# Patient Record
Sex: FEMALE | Race: WHITE | Employment: OTHER | URBAN - METROPOLITAN AREA
[De-identification: names, ages, dates, MRNs, and addresses within clinical notes are randomized per-mention and may not be internally consistent; named-entity substitution may affect disease eponyms.]

---

## 2017-01-03 ENCOUNTER — OFFICE VISIT (OUTPATIENT)
Dept: DERMATOLOGY | Age: 72
End: 2017-01-03

## 2017-01-03 DIAGNOSIS — Z12.83 SCREENING EXAM FOR SKIN CANCER: ICD-10-CM

## 2017-01-03 DIAGNOSIS — L57.0 ACTINIC KERATOSES: Primary | ICD-10-CM

## 2017-01-03 DIAGNOSIS — Z85.828 HISTORY OF NONMELANOMA SKIN CANCER: ICD-10-CM

## 2017-01-03 PROCEDURE — 99213 OFFICE O/P EST LOW 20 MIN: CPT | Performed by: DERMATOLOGY

## 2017-01-03 PROCEDURE — 17003 DESTRUCT PREMALG LES 2-14: CPT | Performed by: DERMATOLOGY

## 2017-01-03 PROCEDURE — 17000 DESTRUCT PREMALG LESION: CPT | Performed by: DERMATOLOGY

## 2017-01-04 ENCOUNTER — TELEPHONE (OUTPATIENT)
Dept: INTERNAL MEDICINE CLINIC | Age: 72
End: 2017-01-04

## 2017-01-16 ENCOUNTER — OFFICE VISIT (OUTPATIENT)
Dept: ORTHOPEDIC SURGERY | Age: 72
End: 2017-01-16

## 2017-01-16 VITALS — HEIGHT: 64 IN | WEIGHT: 164.24 LBS | BODY MASS INDEX: 28.04 KG/M2

## 2017-01-16 DIAGNOSIS — M41.9 SCOLIOSIS, UNSPECIFIED SCOLIOSIS TYPE, UNSPECIFIED SPINAL REGION: ICD-10-CM

## 2017-01-16 DIAGNOSIS — S32.591D: Primary | ICD-10-CM

## 2017-01-16 DIAGNOSIS — S32.10XD CLOSED FRACTURE OF SACRUM WITH ROUTINE HEALING, UNSPECIFIED PORTION OF SACRUM, SUBSEQUENT ENCOUNTER: ICD-10-CM

## 2017-01-16 DIAGNOSIS — M25.551 PAIN OF RIGHT HIP JOINT: ICD-10-CM

## 2017-01-16 PROCEDURE — 99024 POSTOP FOLLOW-UP VISIT: CPT | Performed by: ORTHOPAEDIC SURGERY

## 2017-01-16 PROCEDURE — 72170 X-RAY EXAM OF PELVIS: CPT | Performed by: ORTHOPAEDIC SURGERY

## 2017-01-20 ENCOUNTER — HOSPITAL ENCOUNTER (OUTPATIENT)
Dept: GENERAL RADIOLOGY | Age: 72
Discharge: HOME | End: 2017-01-20
Attending: INTERNAL MEDICINE | Admitting: INTERNAL MEDICINE

## 2017-01-20 DIAGNOSIS — E78.5 HYPERLIPIDEMIA: ICD-10-CM

## 2017-01-20 LAB
A/G RATIO: 1.3 (ref 1.1–2.2)
ALBUMIN SERPL-MCNC: 3.7 G/DL (ref 3.4–5)
ALP BLD-CCNC: 90 U/L (ref 40–129)
ALT SERPL-CCNC: 8 U/L (ref 10–40)
ANION GAP SERPL CALCULATED.3IONS-SCNC: 12 MMOL/L (ref 3–16)
AST SERPL-CCNC: 13 U/L (ref 15–37)
BILIRUB SERPL-MCNC: 0.4 MG/DL (ref 0–1)
BUN BLDV-MCNC: 14 MG/DL (ref 7–20)
CALCIUM SERPL-MCNC: 9.2 MG/DL (ref 8.3–10.6)
CHLORIDE BLD-SCNC: 104 MMOL/L (ref 99–110)
CHOLESTEROL, TOTAL: 162 MG/DL (ref 0–199)
CO2: 27 MMOL/L (ref 21–32)
CREAT SERPL-MCNC: 1.1 MG/DL (ref 0.6–1.2)
GFR AFRICAN AMERICAN: 59
GFR NON-AFRICAN AMERICAN: 49
GLOBULIN: 2.9 G/DL
GLUCOSE BLD-MCNC: 84 MG/DL (ref 70–99)
HDLC SERPL-MCNC: 53 MG/DL (ref 40–60)
LDL CHOLESTEROL CALCULATED: 83 MG/DL
POTASSIUM SERPL-SCNC: 4.3 MMOL/L (ref 3.5–5.1)
SODIUM BLD-SCNC: 143 MMOL/L (ref 136–145)
TOTAL PROTEIN: 6.6 G/DL (ref 6.4–8.2)
TRIGL SERPL-MCNC: 132 MG/DL (ref 0–150)
VLDLC SERPL CALC-MCNC: 26 MG/DL

## 2017-01-23 ENCOUNTER — OFFICE VISIT (OUTPATIENT)
Dept: INTERNAL MEDICINE CLINIC | Age: 72
End: 2017-01-23

## 2017-01-23 VITALS
SYSTOLIC BLOOD PRESSURE: 132 MMHG | BODY MASS INDEX: 25.95 KG/M2 | WEIGHT: 152 LBS | HEIGHT: 64 IN | HEART RATE: 81 BPM | OXYGEN SATURATION: 93 % | DIASTOLIC BLOOD PRESSURE: 78 MMHG

## 2017-01-23 DIAGNOSIS — S32.591A: ICD-10-CM
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
## 2017-01-23 DIAGNOSIS — E78.5 HYPERLIPIDEMIA, UNSPECIFIED HYPERLIPIDEMIA TYPE: ICD-10-CM

## 2017-01-23 DIAGNOSIS — M41.9 SCOLIOSIS, UNSPECIFIED SCOLIOSIS TYPE, UNSPECIFIED SPINAL REGION: ICD-10-CM

## 2017-01-23 DIAGNOSIS — F32.A DEPRESSION, UNSPECIFIED DEPRESSION TYPE: ICD-10-CM

## 2017-01-23 DIAGNOSIS — S32.10XA CLOSED FRACTURE OF SACRUM, UNSPECIFIED PORTION OF SACRUM, INITIAL ENCOUNTER (HCC): ICD-10-CM

## 2017-01-23 DIAGNOSIS — R06.09 DYSPNEA ON EXERTION: Primary | ICD-10-CM

## 2017-01-23 PROCEDURE — 99214 OFFICE O/P EST MOD 30 MIN: CPT | Performed by: INTERNAL MEDICINE

## 2017-01-24 ENCOUNTER — HOSPITAL ENCOUNTER (OUTPATIENT)
Dept: PHYSICAL THERAPY | Age: 72
Discharge: HOME | End: 2017-01-31
Admitting: ORTHOPAEDIC SURGERY

## 2017-02-07 ENCOUNTER — HOSPITAL ENCOUNTER (OUTPATIENT)
Dept: MAMMOGRAPHY | Age: 72
Discharge: HOME | End: 2017-02-07
Attending: INTERNAL MEDICINE | Admitting: INTERNAL MEDICINE

## 2017-02-07 DIAGNOSIS — Z12.31 ENCOUNTER FOR SCREENING MAMMOGRAM FOR MALIGNANT NEOPLASM OF BREAST: ICD-10-CM

## 2017-03-23 ENCOUNTER — HOSPITAL ENCOUNTER (OUTPATIENT)
Dept: NON INVASIVE DIAGNOSTICS | Age: 72
Discharge: HOME | End: 2017-03-23
Attending: INTERNAL MEDICINE | Admitting: INTERNAL MEDICINE

## 2017-03-23 ENCOUNTER — HOSPITAL ENCOUNTER (OUTPATIENT)
Dept: CARDIOLOGY | Facility: CLINIC | Age: 72
Discharge: HOME | End: 2017-03-23
Attending: INTERNAL MEDICINE | Admitting: INTERNAL MEDICINE

## 2017-03-23 DIAGNOSIS — R06.09 OTHER FORMS OF DYSPNEA: ICD-10-CM

## 2017-04-09 ENCOUNTER — TELEPHONE (OUTPATIENT)
Dept: INTERNAL MEDICINE CLINIC | Age: 72
End: 2017-04-09

## 2017-05-09 ENCOUNTER — TELEPHONE (OUTPATIENT)
Dept: INTERNAL MEDICINE CLINIC | Age: 72
End: 2017-05-09

## 2017-05-09 DIAGNOSIS — Z78.0 ASYMPTOMATIC POSTMENOPAUSAL STATUS (AGE-RELATED) (NATURAL): Primary | ICD-10-CM

## 2017-05-09 RX ORDER — MULTIVIT-MIN/IRON/FOLIC ACID/K 18-600-40
2000 CAPSULE ORAL DAILY
COMMUNITY
End: 2017-10-10

## 2017-05-23 ENCOUNTER — HOSPITAL ENCOUNTER (OUTPATIENT)
Dept: MAMMOGRAPHY | Age: 72
Discharge: HOME | End: 2017-05-23
Attending: INTERNAL MEDICINE | Admitting: INTERNAL MEDICINE

## 2017-05-23 DIAGNOSIS — Z78.0 ASYMPTOMATIC POSTMENOPAUSAL STATUS: ICD-10-CM

## 2017-05-23 DIAGNOSIS — Z78.0 ASYMPTOMATIC MENOPAUSAL STATE: ICD-10-CM

## 2017-07-24 ENCOUNTER — HOSPITAL ENCOUNTER (OUTPATIENT)
Dept: GENERAL RADIOLOGY | Age: 72
Discharge: HOME | End: 2017-07-24
Attending: INTERNAL MEDICINE | Admitting: INTERNAL MEDICINE

## 2017-07-24 DIAGNOSIS — E78.5 HYPERLIPIDEMIA, UNSPECIFIED HYPERLIPIDEMIA TYPE: ICD-10-CM

## 2017-07-24 LAB
A/G RATIO: 1.3 (ref 1.1–2.2)
ALBUMIN SERPL-MCNC: 4 G/DL (ref 3.4–5)
ALP BLD-CCNC: 62 U/L (ref 40–129)
ALT SERPL-CCNC: 9 U/L (ref 10–40)
ANION GAP SERPL CALCULATED.3IONS-SCNC: 10 MMOL/L (ref 3–16)
AST SERPL-CCNC: 14 U/L (ref 15–37)
BILIRUB SERPL-MCNC: <0.2 MG/DL (ref 0–1)
BUN BLDV-MCNC: 20 MG/DL (ref 7–20)
CALCIUM SERPL-MCNC: 9.2 MG/DL (ref 8.3–10.6)
CHLORIDE BLD-SCNC: 104 MMOL/L (ref 99–110)
CHOLESTEROL, TOTAL: 182 MG/DL (ref 0–199)
CO2: 28 MMOL/L (ref 21–32)
CREAT SERPL-MCNC: 1.2 MG/DL (ref 0.6–1.2)
GFR AFRICAN AMERICAN: 53
GFR NON-AFRICAN AMERICAN: 44
GLOBULIN: 3 G/DL
GLUCOSE BLD-MCNC: 98 MG/DL (ref 70–99)
HDLC SERPL-MCNC: 55 MG/DL (ref 40–60)
LDL CHOLESTEROL CALCULATED: 104 MG/DL
POTASSIUM SERPL-SCNC: 4.4 MMOL/L (ref 3.5–5.1)
SODIUM BLD-SCNC: 142 MMOL/L (ref 136–145)
TOTAL PROTEIN: 7 G/DL (ref 6.4–8.2)
TRIGL SERPL-MCNC: 114 MG/DL (ref 0–150)
VLDLC SERPL CALC-MCNC: 23 MG/DL

## 2017-07-25 ENCOUNTER — OFFICE VISIT (OUTPATIENT)
Dept: INTERNAL MEDICINE CLINIC | Age: 72
End: 2017-07-25

## 2017-07-25 VITALS
OXYGEN SATURATION: 91 % | SYSTOLIC BLOOD PRESSURE: 116 MMHG | HEIGHT: 64 IN | BODY MASS INDEX: 26.29 KG/M2 | DIASTOLIC BLOOD PRESSURE: 70 MMHG | WEIGHT: 154 LBS | HEART RATE: 82 BPM

## 2017-07-25 DIAGNOSIS — G47.00 INSOMNIA, UNSPECIFIED TYPE: ICD-10-CM

## 2017-07-25 DIAGNOSIS — E78.5 HYPERLIPIDEMIA, UNSPECIFIED HYPERLIPIDEMIA TYPE: Primary | ICD-10-CM

## 2017-07-25 DIAGNOSIS — K21.00 GASTROESOPHAGEAL REFLUX DISEASE WITH ESOPHAGITIS: ICD-10-CM

## 2017-07-25 DIAGNOSIS — F32.A DEPRESSION, UNSPECIFIED DEPRESSION TYPE: ICD-10-CM

## 2017-07-25 PROCEDURE — 99214 OFFICE O/P EST MOD 30 MIN: CPT | Performed by: INTERNAL MEDICINE

## 2017-09-21 ENCOUNTER — TELEPHONE (OUTPATIENT)
Dept: INTERNAL MEDICINE CLINIC | Age: 72
End: 2017-09-21

## 2017-09-21 DIAGNOSIS — M72.0 DUPUYTREN'S CONTRACTURE: Primary | ICD-10-CM

## 2017-10-03 ENCOUNTER — OFFICE VISIT (OUTPATIENT)
Dept: ORTHOPEDIC SURGERY | Age: 72
End: 2017-10-03

## 2017-10-03 VITALS — BODY MASS INDEX: 27.3 KG/M2 | HEIGHT: 63 IN | WEIGHT: 154.1 LBS

## 2017-10-03 DIAGNOSIS — R52 PAIN: Primary | ICD-10-CM

## 2017-10-03 DIAGNOSIS — M72.0 DUPUYTREN'S CONTRACTURE OF BOTH HANDS: ICD-10-CM

## 2017-10-03 PROCEDURE — 99214 OFFICE O/P EST MOD 30 MIN: CPT | Performed by: ORTHOPAEDIC SURGERY

## 2017-10-03 NOTE — MR AVS SNAPSHOT
After Visit Summary             Bhupinder Alejandre   10/3/2017 1:45 PM   Office Visit    Description:  Female : 1945   Provider:  Kevon Chan MD   Department:  [employer]              Your Follow-Up and Future Appointments         Below is a list of your follow-up and future appointments. This may not be a complete list as you may have made appointments directly with providers that we are not aware of or your providers may have made some for you. Please call your providers to confirm appointments. It is important to keep your appointments. Please bring your current insurance card, photo ID, co-pay, and all medication bottles to your appointment. If self-pay, payment is expected at the time of service. Your To-Do List     Future Appointments Provider Department Dept Phone    10/26/2017 10:45 AM Toyin Rowe MD [facility] [phone]    Please arrive 15 minutes prior to appointment, bring photo ID and insurance card. 2018 1:15 PM Mac Cox MD [facility] [phone]    Please arrive 15 minutes prior to appointment, bring photo ID and insurance card. Information from Your Visit        Department     Name Address Phone Fax    [employer] [address] [place], [place] [address]cruz Jacobson [address] [phone]      You Were Seen for:         Comments    Pain   [[id number]]         Vital Signs     Height Weight Body Mass Index Smoking Status          5' 3.39\" (1.61 m) 154 lb 1.6 oz (69.9 kg) 26.97 kg/m2 Never Smoker        Additional Information about your Body Mass Index (BMI)           Your BMI as listed above is considered overweight (25.0-29.9). BMI is an estimate of body fat, calculated from your height and weight.   The higher your BMI, the greater your risk of heart disease, high blood pressure, type 2 diabetes, stroke, gallstones, arthritis, sleep apnea, and certain cancers. BMI is not perfect. It may overestimate body fat in athletes and people who are more muscular. If your body fat is high you can improve your BMI by decreasing your calorie intake and becoming more physically active. Learn more at: [employer]             Medications and Orders      Your Current Medications Are              traZODone (DESYREL) 50 MG tablet TAKE ONE AND ONE-HALF TABLETS BY MOUTH EVERY NIGHT AT BEDTIME    pravastatin (PRAVACHOL) 20 MG tablet TAKE 1 TABLET BY MOUTH EVERY EVENING FOR HIGH CHOLESTEROL    Cholecalciferol (VITAMIN D) 2000 UNITS CAPS capsule Take 2,000 Units by mouth daily    buPROPion (WELLBUTRIN SR) 150 MG extended release tablet TAKE 1 TABLET BY MOUTH EVERY DAY FOR DEPRESSION    FLUoxetine (PROZAC) 20 MG capsule Take 1 capsule by mouth daily Depression medicine. meloxicam (MOBIC) 15 MG tablet TAKE 1 TABLET BY MOUTH DAILY WITH MEALS AS NEEDED FOR KNEE PAIN    omeprazole (PRILOSEC) 20 MG capsule Take 20 mg by mouth daily    Cetirizine HCl (ZYRTEC PO) Take 1 tablet by mouth daily.         Allergies              Amoxicillin Rash    Entex Pse [Pseudoephedrine-guaifenesin]     gittery         Result Summary for XR HAND RIGHT (MIN 3 VIEWS)      Result Information     Date and Time          Resulted: 10/3/2017  1:31 PM                       Additional Information        Basic Information     Date Of Birth Sex Race Ethnicity Preferred Language    1945 Female White Non-/Non  English      Problem List as of 10/3/2017  Date Reviewed: 7/25/2017                Hyperlipidemia    Depression    Insomnia    IBS (irritable bowel syndrome)    Cervical radiculopathy    Shingles    Arthritis    Dupuytren's contracture    Gastroesophageal reflux disease with esophagitis    Scoliosis    Closed nondisplaced fracture of acetabulum ([address]    Fracture of ramus of right pubis ([address]    Closed fracture of sacrum (HCC)    Pelvic fracture ([address] 6. Create a [employer] password. You can change your password at any time. 7. Enter your Password Reset Question and Answer. This can be used at a later time if you forget your password. 8. Enter your e-mail address. You will receive e-mail notification when new information is available in [address] 9. Click Sign Up. You can now view your medical record. Additional Information  If you have questions, please contact the physician practice where you receive care. Remember, [employer] is NOT to be used for urgent needs. For medical emergencies, dial 911. For questions regarding your [employer] account call [phone]. If you have a clinical question, please call your doctor's office.

## 2017-10-03 NOTE — PROGRESS NOTES
deficit. Hand Examination  Inspection:  Right hand reveals thick cording within the palm in line with the middle finger and ring finger. No sign of infection. Contracture noted of the MP joints    Palpation:  Cords are thick consistent with Dupuytren's. No pain with palpation    Range of Motion:  Excellent flexion of the fingers, no clicking. 25-35° flexion contracture middle finger and ring finger MP joint, not passively correctable. Strength:  Intact neurovascular exam    Special Tests:  She fails the tabletop test    Skin: There are no additional worrisome rashes, ulcerations or lesions. Gait: normal    Circulation: well perfused    Additional Comments:     Additional Examinations:  Left Upper Extremity: Examination of the left upper extremity does not show any tenderness, deformity or injury. Range of motion is unremarkable. There is no gross instability. There is cording and nodularity within the left palm also. However she has full flexion and extension of the hand at this time. She passes the tabletop test     Radiology:     None today      Assessment:  Dupuytren's disease, worse on the right hand    Impression:   Encounter Diagnoses   Name Primary?  Pain Yes    Dupuytren's contracture of both hands        Office Procedures:  No orders of the defined types were placed in this encounter. Treatment Plan:  The patient is unhappy with the Dupuytren's contracture at this point. She has tried activity modifications and stretching for more than 10 years. Therefore, we discussed proceeding with Dupuytren's disease excision. We discussed Dupuytren's disease today, its genetic basis, and the fact that there is no known cure at this point. Treatment options were discussed at length (surgical excision, needle aponeurotomy, injection with xiaflex). The patient is unhappy with the Dupuytren's disease within the hand and elects for surgical excision.   Surgical excision is a debulking procedure. There will still be residual disease, secondary to the genetic basis. Recurrence therefore is a known entity. Surgical scars can be sensitive. Contractures can recur postsurgically despite debulking of the Dupuytren's disease. Motion of the hand and fingers is very critical postsurgically to achieve optimal outcomes. Postoperative hand therapy is therefore critical, and was explained today. She would like to proceed. This will be as an outpatient procedure, most likely under general and local.  We will ask for preoperative evaluation and clearance. We appreciate the patient referral.    All questions and concerns were addressed today. Patient is in agreement with the plan.         Roseanne Sanchez MD  Hand & Upper Extremity Surgery  [facility] ([facility])

## 2017-10-04 RX ORDER — FLUOXETINE HYDROCHLORIDE 20 MG/1
CAPSULE ORAL
Qty: 30 CAPSULE | Refills: 0 | Status: SHIPPED | OUTPATIENT
Start: 2017-10-04 | End: 2017-10-04 | Stop reason: SDUPTHER

## 2017-10-04 RX ORDER — FLUOXETINE HYDROCHLORIDE 20 MG/1
CAPSULE ORAL
Qty: 90 CAPSULE | Refills: 0 | Status: SHIPPED | OUTPATIENT
Start: 2017-10-04 | End: 2018-01-03 | Stop reason: SDUPTHER

## 2017-10-04 NOTE — TELEPHONE ENCOUNTER
Refill request for        -        Fluoxetine 20 MG            medication.      Name of [facility]   # [id number]    Last visit - 07/25/17     Pending visit - 10/26/17    Last refill -10/27/17

## 2017-10-05 ENCOUNTER — TELEPHONE (OUTPATIENT)
Dept: ORTHOPEDIC SURGERY | Age: 72
End: 2017-10-05

## 2017-10-05 DIAGNOSIS — M72.0 CONTRACTURE OF PALMAR FASCIA: Primary | ICD-10-CM

## 2017-10-10 ENCOUNTER — HOSPITAL ENCOUNTER (OUTPATIENT)
Dept: GENERAL RADIOLOGY | Age: 72
Discharge: HOME | End: 2017-10-10
Attending: INTERNAL MEDICINE | Admitting: INTERNAL MEDICINE

## 2017-10-10 ENCOUNTER — OFFICE VISIT (OUTPATIENT)
Dept: INTERNAL MEDICINE CLINIC | Age: 72
End: 2017-10-10

## 2017-10-10 VITALS
OXYGEN SATURATION: 92 % | WEIGHT: 156 LBS | SYSTOLIC BLOOD PRESSURE: 130 MMHG | HEIGHT: 64 IN | HEART RATE: 93 BPM | DIASTOLIC BLOOD PRESSURE: 70 MMHG | BODY MASS INDEX: 26.63 KG/M2

## 2017-10-10 DIAGNOSIS — Z01.818 PREOP EXAMINATION: Primary | ICD-10-CM

## 2017-10-10 DIAGNOSIS — K21.00 GASTROESOPHAGEAL REFLUX DISEASE WITH ESOPHAGITIS: ICD-10-CM

## 2017-10-10 DIAGNOSIS — Z23 NEED FOR IMMUNIZATION AGAINST INFLUENZA: ICD-10-CM

## 2017-10-10 DIAGNOSIS — R06.02 SOB (SHORTNESS OF BREATH): ICD-10-CM

## 2017-10-10 DIAGNOSIS — E78.5 HYPERLIPIDEMIA, UNSPECIFIED HYPERLIPIDEMIA TYPE: ICD-10-CM

## 2017-10-10 DIAGNOSIS — F32.A DEPRESSION, UNSPECIFIED DEPRESSION TYPE: ICD-10-CM

## 2017-10-10 DIAGNOSIS — G47.00 INSOMNIA, UNSPECIFIED TYPE: ICD-10-CM

## 2017-10-10 DIAGNOSIS — M72.0 DUPUYTREN'S CONTRACTURE: ICD-10-CM

## 2017-10-10 DIAGNOSIS — Z01.818 PREOP EXAMINATION: ICD-10-CM

## 2017-10-10 DIAGNOSIS — K58.9 IRRITABLE BOWEL SYNDROME WITHOUT DIARRHEA: ICD-10-CM

## 2017-10-10 LAB
ANION GAP SERPL CALCULATED.3IONS-SCNC: 13 MMOL/L (ref 3–16)
BUN BLDV-MCNC: 16 MG/DL (ref 7–20)
CALCIUM SERPL-MCNC: 9.3 MG/DL (ref 8.3–10.6)
CHLORIDE BLD-SCNC: 102 MMOL/L (ref 99–110)
CO2: 28 MMOL/L (ref 21–32)
CREAT SERPL-MCNC: 1.1 MG/DL (ref 0.6–1.2)
GFR AFRICAN AMERICAN: 59
GFR NON-AFRICAN AMERICAN: 49
GLUCOSE BLD-MCNC: 71 MG/DL (ref 70–99)
POTASSIUM SERPL-SCNC: 4.3 MMOL/L (ref 3.5–5.1)
SODIUM BLD-SCNC: 143 MMOL/L (ref 136–145)

## 2017-10-10 PROCEDURE — 93000 ELECTROCARDIOGRAM COMPLETE: CPT | Performed by: INTERNAL MEDICINE

## 2017-10-10 PROCEDURE — G0008 ADMIN INFLUENZA VIRUS VAC: HCPCS | Performed by: INTERNAL MEDICINE

## 2017-10-10 PROCEDURE — 99215 OFFICE O/P EST HI 40 MIN: CPT | Performed by: INTERNAL MEDICINE

## 2017-10-10 PROCEDURE — 90662 IIV NO PRSV INCREASED AG IM: CPT | Performed by: INTERNAL MEDICINE

## 2017-10-10 NOTE — PROGRESS NOTES
Vaccine Information Sheet, \"Influenza - Inactivated\"  given to Lyle Amos, or parent/legal guardian of  Lyle Amos and verbalized understanding. Patient responses:    Have you ever had a reaction to a flu vaccine? No  Are you able to eat eggs without adverse effects? Yes  Do you have any current illness? No  Have you ever had Guillian Jefferson Syndrome? No    Flu vaccine given per order. Please see immunization tab.

## 2017-10-10 NOTE — PROGRESS NOTES
Bhupinder Alejandre  YOB: 1945    Date of Service:  10/10/2017    Vitals:    10/10/17 1045   Pulse: 93   SpO2: 92%   Weight: 156 lb (70.8 kg)   Height: 5' 3.5\" (1.613 m)      Wt Readings from Last 2 Encounters:   10/10/17 156 lb (70.8 kg)   10/03/17 154 lb 1.6 oz (69.9 kg)     BP Readings from Last 3 Encounters:   07/25/17 116/70   01/23/17 132/78   12/19/16 123/72        Chief Complaint   Patient presents with   [facility] Pre-op Exam     Allergies   Allergen Reactions    Amoxicillin Rash    Entex Pse [Pseudoephedrine-Guaifenesin]      gittery       Outpatient Prescriptions Marked as Taking for the 10/10/17 encounter (Office Visit) with Toyin Rowe MD   Medication Sig Dispense Refill    FLUoxetine (PROZAC) 20 MG capsule TAKE 1 CAPSULE BY MOUTH DAILY 90 capsule 0    traZODone (DESYREL) 50 MG tablet TAKE ONE AND ONE-HALF TABLETS BY MOUTH EVERY NIGHT AT BEDTIME 135 tablet 3    pravastatin (PRAVACHOL) 20 MG tablet TAKE 1 TABLET BY MOUTH EVERY EVENING FOR HIGH CHOLESTEROL 30 tablet 9    buPROPion (WELLBUTRIN SR) 150 MG extended release tablet TAKE 1 TABLET BY MOUTH EVERY DAY FOR DEPRESSION 90 tablet 3    omeprazole (PRILOSEC) 20 MG capsule Take 20 mg by mouth daily      Cetirizine HCl (ZYRTEC PO) Take 1 tablet by mouth daily. This patient presents to the office today for a preoperative consultation at the request of surgeon, Dr. Tory Isaac, who plans on performing  right hand Dupuytren's Contracture Excision on 10/19/2017  at  [facility].     Planned anesthesia: general  Known anesthesia problems: No  Bleeding risk: No  Personal or FH of DVT/PE:  No  Patient objection to receiving blood products:  No    Patient Active Problem List   Diagnosis    Insomnia    IBS (irritable bowel syndrome)    Arthritis    Dupuytren's contracture    Hyperlipidemia    Cervical radiculopathy    Depression    Shingles    Biliary calculus with cholecystitis    Primary localized osteoarthrosis, lower leg    [facility]    Pelvic fracture (HCC)    Closed nondisplaced fracture of acetabulum (HCC)    Fracture of ramus of right pubis (HCC)    Closed fracture of sacrum (HCC)    Scoliosis    Gastroesophageal reflux disease with esophagitis       Past Medical History:   Diagnosis Date    Anxiety     Arthritis     Cancer ([facility]     skin    Cervical radiculopathy 5/2012    LT. c-5 c-6 MRI    Depression     Dupuytren's contracture     Fractures     Hyperlipidemia     Hyperlipidemia     IBS (irritable bowel syndrome)     Insomnia     Migraine     past hx    Reflux     Shingles 11/2013    Left Face.  SOB (shortness of breath) 03/23/2017    GXT  Normal     Past Surgical History:   Procedure Laterality Date    BRONCHOSCOPY  9/11/14    Right Middle Lobe Volume Loss:Negative Pathology    CATARACT REMOVAL      bilateral    CHOLECYSTECTOMY  07/29/14    DaVinci assisted laparoscopic cholecystetomy    COLONOSCOPY  03/2003    negative    DILATION AND CURETTAGE OF UTERUS      x 2    KNEE ARTHROSCOPY      MOHS SURGERY      squamous cell    SKIN BIOPSY  11/19/2012    squamous cell nose    TONSILLECTOMY AND ADENOIDECTOMY       Family History   Problem Relation Age of Onset    High Blood Pressure Mother     Heart Disease Mother [de-identified]     CABG    Diabetes Mother     Heart Disease Maternal Grandmother      Social History     Social History    Marital status:      Spouse name: N/A    Number of children: N/A    Years of education: N/A     Occupational History    Not on file.      Social History Main Topics    Smoking status: Never Smoker    Smokeless tobacco: Never Used    Alcohol use No    Drug use: No    Sexual activity: Not on file     Other Topics Concern    Not on file     Social History Narrative    No narrative on file       Review of Systems  Review of Systems   Constitutional: negative   HENT: negative   EYES: negative   Respiratory: negative   Gastrointestinal: IBS Baseline Endocrine: negative   Musculoskeletal: Rt Hand   Skin: negative   Allergic/Immunological: negative   Hematological: negative   Psychiatric/Behavorial: negative   CV: negative   CNS: negative   :Negative   S/E:Negative  Renal: Negative         Physical Exam Blood pressure 130/70, pulse 93, height 5' 3.5\" (1.613 m), weight 156 lb (70.8 kg), SpO2 92 %. Constitutional: She is oriented to person, place, and time. She appears well-developed and well-nourished. No distress. HENT:   Head: Normocephalic and atraumatic. Mouth/Throat: Uvula is midline, oropharynx is clear and moist and mucous membranes are normal.   Eyes: Conjunctivae and EOM are normal. Pupils are equal, round, and reactive to light. Neck: Trachea normal and normal range of motion. Neck supple. No JVD present. Carotid bruit is not present. No mass and no thyromegaly present. Cardiovascular: Normal rate, regular rhythm, normal heart sounds and intact distal pulses. Exam reveals no gallop and no friction rub. LOUIS heard. Pulmonary/Chest: Effort normal and breath sounds normal. No respiratory distress. She has no wheezes. She has no rales. Abdominal: Soft. Normal aorta and bowel sounds are normal. She exhibits no distension and no mass. There is no hepatosplenomegaly. No tenderness. Musculoskeletal: She exhibits no edema and no tenderness. Neurological: She is alert and oriented to person, place, and time. She has normal strength. No cranial nerve deficit or sensory deficit. Coordination and gait normal.   Skin: Skin is warm and dry. No rash noted. No erythema. Psychiatric: She has a normal mood and affect. Her behavior is normal.     EKG Interpretation:  10/10/2017: Sinus rhythm. Nonspecific T-wave abnormalities. GXT: 3/23/2017: Normal    Lab Review  BMP lab test 10/10/2017       Assessment:       74 y.o. patient with planned surgery as above. Known risk factors for perioperative complications: None.   Current medications which

## 2017-10-18 ASSESSMENT — ENCOUNTER SYMPTOMS: SHORTNESS OF BREATH: 1

## 2017-10-18 NOTE — ANESTHESIA PRE-OP
Department of Anesthesiology  Preprocedure Note       Name:  Nate Mao   Age:  74 y.o.  :  1945                                          MRN:  [id number]         Date:  10/18/2017      Surgeon:    Procedure:    Medications prior to admission:   Prior to Admission medications    Medication Sig Start Date End Date Taking? Authorizing Provider   FLUoxetine (PROZAC) 20 MG capsule TAKE 1 CAPSULE BY MOUTH DAILY 10/4/17   Stephanie Larry CNP   traZODone (DESYREL) 50 MG tablet TAKE ONE AND ONE-HALF TABLETS BY MOUTH EVERY NIGHT AT BEDTIME 17   Jurgen Rowe MD   pravastatin (PRAVACHOL) 20 MG tablet TAKE 1 TABLET BY MOUTH EVERY EVENING FOR HIGH CHOLESTEROL 17   Jurgen Rowe MD   buPROPion (WELLBUTRIN SR) 150 MG extended release tablet TAKE 1 TABLET BY MOUTH EVERY DAY FOR DEPRESSION 3/17/17   Jurgen Rowe MD   omeprazole (PRILOSEC) 20 MG capsule Take 20 mg by mouth daily    Historical Provider, MD   Cetirizine HCl (ZYRTEC PO) Take 1 tablet by mouth daily. Historical Provider, MD       Current medications:    Current Outpatient Prescriptions   Medication Sig Dispense Refill    FLUoxetine (PROZAC) 20 MG capsule TAKE 1 CAPSULE BY MOUTH DAILY 90 capsule 0    traZODone (DESYREL) 50 MG tablet TAKE ONE AND ONE-HALF TABLETS BY MOUTH EVERY NIGHT AT BEDTIME 135 tablet 3    pravastatin (PRAVACHOL) 20 MG tablet TAKE 1 TABLET BY MOUTH EVERY EVENING FOR HIGH CHOLESTEROL 30 tablet 9    buPROPion (WELLBUTRIN SR) 150 MG extended release tablet TAKE 1 TABLET BY MOUTH EVERY DAY FOR DEPRESSION 90 tablet 3    omeprazole (PRILOSEC) 20 MG capsule Take 20 mg by mouth daily      Cetirizine HCl (ZYRTEC PO) Take 1 tablet by mouth daily. No current facility-administered medications for this encounter. Allergies:     Allergies   Allergen Reactions    Amoxicillin Rash    Entex Pse [Pseudoephedrine-Guaifenesin]      gittery         Problem List:    Patient Active Problem List   Diagnosis Code    Insomnia Wt Readings from Last 1 Encounters:   10/10/17 156 lb (70.8 kg)     There is no height or weight on file to calculate BMI. CBC   Lab Results   Component Value Date    WBC 7.7 12/04/2016    RBC 3.81 12/04/2016    HGB 11.4 12/04/2016    HCT 34.3 12/04/2016    MCV 90.1 12/04/2016    RDW 13.1 12/04/2016     12/04/2016     CMP    Lab Results   Component Value Date     10/10/2017    K 4.3 10/10/2017     10/10/2017    CO2 28 10/10/2017    BUN 16 10/10/2017    CREATININE 1.1 10/10/2017    GFRAA 59 10/10/2017    GFRAA >60 04/18/2013    AGRATIO 1.3 07/24/2017    LABGLOM 49 10/10/2017    GLUCOSE 71 10/10/2017    PROT 7.0 07/24/2017    PROT 6.9 10/12/2012    CALCIUM 9.3 10/10/2017    BILITOT <0.2 07/24/2017    ALKPHOS 62 07/24/2017    AST 14 07/24/2017    ALT 9 07/24/2017     POCGlucose  No results for input(s): GLUCOSE in the last 72 hours. Coags  No results found for: PROTIME, INR, APTT  HCG (If Applicable) No results found for: PREGTESTUR, PREGSERUM, HCG, HCGQUANT   ABGs No results found for: PHART, PO2ART, PYQ8AYR, UXD6CVE, BEART, X5UTHCCF   Type & Screen (If Applicable)  No results found for: LABABO, LABRH        Anesthesia Plan      general     ASA 2       Induction: intravenous. MIPS: Postoperative opioids intended and Prophylactic antiemetics administered. Anesthetic plan and risks discussed with patient. Plan discussed with CRNA. All questions answered and agrees with plan.         Linda Meeks MD   10/18/2017

## 2017-10-19 ENCOUNTER — HOSPITAL ENCOUNTER (OUTPATIENT)
Dept: SURGERY | Age: 72
Discharge: HOME | End: 2017-10-19
Attending: ORTHOPAEDIC SURGERY | Admitting: ORTHOPAEDIC SURGERY

## 2017-10-19 VITALS
HEART RATE: 69 BPM | HEIGHT: 64 IN | SYSTOLIC BLOOD PRESSURE: 135 MMHG | DIASTOLIC BLOOD PRESSURE: 76 MMHG | BODY MASS INDEX: 26.63 KG/M2 | TEMPERATURE: 98 F | RESPIRATION RATE: 16 BRPM | OXYGEN SATURATION: 98 % | WEIGHT: 156 LBS

## 2017-10-19 RX ORDER — HYDROMORPHONE HCL 110MG/55ML
0.5 PATIENT CONTROLLED ANALGESIA SYRINGE INTRAVENOUS EVERY 5 MIN PRN
Status: DISCONTINUED | OUTPATIENT
Start: 2017-10-19 | End: 2017-10-20 | Stop reason: HOSPADM

## 2017-10-19 RX ORDER — ONDANSETRON 2 MG/ML
4 INJECTION INTRAMUSCULAR; INTRAVENOUS EVERY 10 MIN PRN
Status: DISCONTINUED | OUTPATIENT
Start: 2017-10-19 | End: 2017-10-20 | Stop reason: HOSPADM

## 2017-10-19 RX ORDER — HYDROCODONE BITARTRATE AND ACETAMINOPHEN 5; 325 MG/1; MG/1
1 TABLET ORAL EVERY 6 HOURS PRN
Qty: 25 TABLET | Refills: 0 | Status: SHIPPED | OUTPATIENT
Start: 2017-10-19 | End: 2017-10-26

## 2017-10-19 RX ORDER — MEPERIDINE HYDROCHLORIDE 25 MG/ML
12.5 INJECTION INTRAMUSCULAR; INTRAVENOUS; SUBCUTANEOUS EVERY 5 MIN PRN
Status: DISCONTINUED | OUTPATIENT
Start: 2017-10-19 | End: 2017-10-20 | Stop reason: HOSPADM

## 2017-10-19 RX ORDER — LABETALOL HYDROCHLORIDE 5 MG/ML
5 INJECTION, SOLUTION INTRAVENOUS EVERY 10 MIN PRN
Status: DISCONTINUED | OUTPATIENT
Start: 2017-10-19 | End: 2017-10-20 | Stop reason: HOSPADM

## 2017-10-19 RX ORDER — HYDROMORPHONE HCL 110MG/55ML
0.25 PATIENT CONTROLLED ANALGESIA SYRINGE INTRAVENOUS EVERY 5 MIN PRN
Status: DISCONTINUED | OUTPATIENT
Start: 2017-10-19 | End: 2017-10-20 | Stop reason: HOSPADM

## 2017-10-19 RX ORDER — HYDRALAZINE HYDROCHLORIDE 20 MG/ML
5 INJECTION INTRAMUSCULAR; INTRAVENOUS EVERY 10 MIN PRN
Status: DISCONTINUED | OUTPATIENT
Start: 2017-10-19 | End: 2017-10-20 | Stop reason: HOSPADM

## 2017-10-19 RX ORDER — OXYCODONE HYDROCHLORIDE AND ACETAMINOPHEN 5; 325 MG/1; MG/1
2 TABLET ORAL PRN
Status: ACTIVE | OUTPATIENT
Start: 2017-10-19 | End: 2017-10-19

## 2017-10-19 RX ORDER — LIDOCAINE HYDROCHLORIDE 10 MG/ML
1 INJECTION, SOLUTION EPIDURAL; INFILTRATION; INTRACAUDAL; PERINEURAL
Status: COMPLETED | OUTPATIENT
Start: 2017-10-19 | End: 2017-10-19

## 2017-10-19 RX ORDER — SODIUM CHLORIDE, SODIUM LACTATE, POTASSIUM CHLORIDE, CALCIUM CHLORIDE 600; 310; 30; 20 MG/100ML; MG/100ML; MG/100ML; MG/100ML
INJECTION, SOLUTION INTRAVENOUS CONTINUOUS
Status: DISCONTINUED | OUTPATIENT
Start: 2017-10-19 | End: 2017-10-20 | Stop reason: HOSPADM

## 2017-10-19 RX ORDER — OXYCODONE HYDROCHLORIDE AND ACETAMINOPHEN 5; 325 MG/1; MG/1
1 TABLET ORAL PRN
Status: ACTIVE | OUTPATIENT
Start: 2017-10-19 | End: 2017-10-19

## 2017-10-19 RX ADMIN — LIDOCAINE HYDROCHLORIDE 0.1 ML: 10 INJECTION, SOLUTION EPIDURAL; INFILTRATION; INTRACAUDAL; PERINEURAL at 09:23

## 2017-10-19 RX ADMIN — SODIUM CHLORIDE, SODIUM LACTATE, POTASSIUM CHLORIDE, CALCIUM CHLORIDE: 600; 310; 30; 20 INJECTION, SOLUTION INTRAVENOUS at 09:24

## 2017-10-19 ASSESSMENT — PAIN - FUNCTIONAL ASSESSMENT: PAIN_FUNCTIONAL_ASSESSMENT: 0-10

## 2017-10-19 NOTE — PROGRESS NOTES
Phase I (PACU)  1. Patient is identified using name and the date of birth. 2.  The patient is free from signs and symptoms of chemical, electrical, laser, radiation, positioning, or transfer/transport injury. 3.  The patient receives appropriate medication(s), safely administered during the Perioperative period. 4.  The patient has wound/tissue perfusion consistent with or improved from baseline levels established preoperatively. 5.  The patient is at or returning to normothermia at the conclusion of the immediate postoperative period. 6.  The patient's fluid, electrolyte, and acid base balances are consistent with or improved from baseline levels established preoperatively. 7.  The patient's pulmonary function is consistent with or improved from baseline levels established preoperatively. 8.  The patient's cardiovascular status is consistent with or improved from baseline levels established preoperatively. 9.  The patient/caregiver participates in decisions affecting his or her Perioperative plan of care. 10. The patient's care is consistent with the individualized Perioperative plan of care. 11. The patient's right to privacy is maintained. 12. The patient is the recipient of competent and ethical care within legal standards of practice. 13.  The patient's value system, lifestyle, ethnicity, and culture are considered, respected, and incorporated in the Perioperative plan of care. 14.  The patient demonstrates and/or reports adequate pain control throughout the the Perioperative period. 15. The patient's neurological status is consistent with or improved from baseline levels established preoperatively. 16.  The patient/caregiver demonstrates knowledge of the expected responses to the operative or invasive procedure. 17.  Patient/Caregiver has reduced anxiety. Interventions- Familiarize with environment and equipment.   18.  Other:  19.Other:
Pt arrived from OR in stable condition. All vitals stable and report received.
Pt transferred to phase 2 of care.
baseline levels established preoperatively. 23.  The patient/caregiver demonstrates knowledge of the expected responses to the operative or invasive procedure. 20.  Patient/Caregiver has reduced anxiety. Interventions- Familiarize with environment and equipment.   21. Other:  22. Other:
process. 23. Patient pain level is established preoperatively using age appropriate pain scale. 24. The patient will move to fall risk upon sedation- during and through the recovery phase. [place] to environment. Call light in reach. Instruct to call for assistance prior to getting up. Non skid footwear. Bed wheels locked. Bed in lowest position. Siderails up times two. Family at chairside/bedside preoperatively to assist with increased observation of patient.

## 2017-10-19 NOTE — BRIEF OP NOTE
Brief Postoperative Note    Clara Hill  YOB: 1945  [id number]    Pre-operative Diagnosis: right hand Dupuytrens disease    Post-operative Diagnosis: Same    Procedure: Fasciectomy right hand including middle finger and ring finger, out to PIPJs    Anesthesia: General and Local    Surgeons/Assistants: Navjot / mercy SA    Estimated Blood Loss: less than 50     Complications: None    Specimens: Was Obtained: Dupuytrens disease    Findings: see full op note    Electronically signed by Nehemiah Villa MD on 10/19/2017 at 12:11 PM

## 2017-10-19 NOTE — H&P
I have reviewed the History & Physical and examined the patient and find no relevant changes. I have reviewed with the patient and/or family the risks, benefits, and alternatives to the procedure(s). All questions and concerns were addressed. Consent is on the chart. Surgical site, right hand (middle and ring) has been marked by Dr Chava Keene and confirmed by the patient. We discussed Dupuytren's disease today, its genetic basis, and the fact that there is no known cure at this point. Treatment options were discussed at length (surgical excision, needle aponeurotomy, injection with xiaflex). The patient is unhappy with the Dupuytren's disease within the hand and elects for surgical excision. Surgical excision is a debulking procedure. There will still be residual disease, secondary to the genetic basis. Recurrence therefore is a known entity. Surgical scars can be sensitive. Contractures can recur postsurgically despite debulking of the Dupuytren's disease. Motion of the hand and fingers is very critical postsurgically to achieve optimal outcomes. Postoperative hand therapy is therefore critical, and was explained today. All questions and concerns were addressed today. Patient is in agreement with the plan.         Madhuri Agustin MD  Hand & Upper Extremity Surgery  [address] [facility] [facility] ([facility])

## 2017-10-20 NOTE — OP NOTE
[address]                [address] [address]                               OPERATIVE REPORT    PATIENT NAME: Yanna Lew                      :             1945  MED REC NO:   [id number]                           ROOM:  ACCOUNT NO:   [de-identified]                           ADMISSION DATE:  10/19/2017  PROVIDER:     Iva Thao MD    DATE OF PROCEDURE:  10/19/2017    LOCATION:  [address] [place] [address]. PREOPERATIVE DIAGNOSIS:  Right hand Dupuytren's disease. POSTOPERATIVE DIAGNOSIS:  Right hand Dupuytren's disease. OPERATION PERFORMED:  1. Fasciectomy of the right hand including the middle finger  including the proximal interphalangeal joint. 2.  Separate incision with fasciectomy, right hand including the right  ring finger out to the proximal interphalangeal joint. SURGEON:  Iva Thao MD    ASSISTANT:  SA Zehra    ANESTHESIA:  General and local.    ESTIMATED BLOOD LOSS:  10 mL    COMPLICATIONS:  None. SPECIMEN:  Dupuytren's disease, right hand. DRAINS:  None. HISTORY OF PRESENT ILLNESS:  The patient is a pleasant female with a  progressive Dupuytren's contracture of the right hand with a very  thick cord within the right palm. She had cording that extended out  to the middle finger and ring finger. This resulted in a contracture  of both digits that was not passively correctable. Her contracture  was getting in the way of her normal daily activities. She desired to  have this fixed. We discussed excision. She was in agreement with  the plan. The right hand including the middle finger and ring finger  were marked in preop holding by Dr. Janelle Up and verified by the  patient. Informed consent for the procedure was signed and on the  chart. Regional anesthetic was not given to the patient.     OPERATIVE COURSE:  The patient was taken to operating room, placed in  usual supine position and general anesthesia was given. The right  upper extremity was prepped and draped in the normal sterile fashion. Antibiotic and DVT prophylaxis was then placed and a formal time-out  was held. Following exsanguination, the tourniquet was inflated to  250 mmHg. We began with an incision in the palm of the hand that began near  Guerra's cardinal line and extended distally past the proximal  interphalangeal joint of the ring finger. This was done through skin  only. She had very thin skin. We carefully elevated the  full-thickness skin layer off of the very thick Dupuytren's cord. The  cord was isolated from the underlying flexor tendons and underlying  neurovascular structures, which were identified on each side of the  cord. The cord was transected within the palm under direct  visualization protecting the normal structures. The cord was then  traced proximally. After carefully isolating the cord, it was  released off of the distal aspect of the palmaris fascia. This was  sent as pathologic specimen. The cord was then traced distally  through the [facility] type incision. The cord extended past the MP joint  crease of the ring finger and out through the PIP joint crease. Both  the neurovascular bundles on either side of the flexor tendon were  traced out distally and they were carefully protected while the cord  was excised. The cord was completely excised from the ring finger and  sent as specimen. The flexor tendons were intact and normal  appearing. No trigger digit release or pulley release was necessary. At this time, the ring finger had full passive extension. There was still a palpable cord on the volar surface of the middle  finger, therefore a separate incision was now made distal, beginning  in the palm and extending past the proximal inner proximal digital  crease of the middle finger. This was done through skin only. Full-thickness skin flaps were carefully elevated.   A thick

## 2017-10-26 ENCOUNTER — HOSPITAL ENCOUNTER (OUTPATIENT)
Dept: OCCUPATIONAL THERAPY | Age: 72
Discharge: HOME | End: 2017-10-31
Admitting: ORTHOPAEDIC SURGERY

## 2017-10-26 NOTE — PLAN OF CARE
Medical & Therapy History: Referred for therapy following recent surgery. Pain Scale: 1/10   []Constant      [x]Intermittent    []other:  Pain Location:  hand  Easing factors: rest  Provocative factors: movement      Occupational Profile:  Home Enviroment: lives with  [x] spouse,  [] family,  [] alone,  [] significant other,   [] other:    Occupation/School: Retired    Recreational Activities/Meaningful Interests: swimming    Prior Level of Function: [x] Independent with ADLs/IADLs     [] Assistance needed (describe):    Patient-Identified Primary Performance Deficits (to be addressed in POC):   [x] bathing    [x] household tasks (cooking/cleaning)   [x] dressing    [] self feeding   [] grooming    [] work/education   [x] functional mobility   [] sleeping/rest   [] toileting/hygiene   [x] recreational activities   [] driving    [] community/social participation   [] other:     Comorbidities Affecting Functional Performance:     []Anxiety (F41.9)/Depression (F32.9)   []Diabetes Type 1(E10.65) or 2 (E11.65)   []Rheumatoid Arthritis (M05.9)  []Fibromyalgia (M79.7)  []Neuropathy(G60.9)  []Osteoarthritis(M19.91)  [x]None   []Other:    Hand Dominance:   []  Right    [] Left    OBJECTIVE:   Date: 10/26/2017     Objective Measures:    Digit  ROM         Index     MP:  PIP:  DIP:                              Long MP:0/75  PIP:0/75  DIP0/50                              Ring MP:0/70  PIP:0/85  DIP0/55                              Small MP:  PIP:  DIP   Digits tip to DPFC in cm Index:  Long:  Ring:  Small:   Wrist ROM Ext/Flex R:  L:   Forearm sup/pron R:  L:   Rad/Ulnar deviation R:  L:   Thumb AROM MP  IP   Thumb opposition R;  L:   Thumb Radial/palmar ABD R:  L:   Elbow ROM Ext/flex R:  L:   Edema in cm circumf. Wrist R:  L:   Edema in cm circumf.   MCPJs R:  L:    strength in lbs R:  L:   Pinch Strengthin lbs: lat  R:  L:   Pinch Strength in lbs:  3 point R:  L:     MMT:       Observations (including splints, bandages, incisions, scars):   Sutures dry and intact, no drainage    Sensation: [x] No reported deficits  [] Intact to light touch    [] [place] test completed, findings as noted:  [] Other:    Palpation: N/A    Functional Mobility/Transfers/Gait:   [x] Independent - no significant gait deviations  [] Assistance needed   [] Assistive device used: Falls Risk Assessment (30 days):   [x] Falls Risk assessed and no intervention required. [] Falls Risk assessed and Patient requires intervention due to being higher risk   TUG score (>12s at risk):     [] Falls education provided, including      Review Of Systems (ROS): [x]Performed Review of systems (Integumentary, CardioPulmonary, Neurological) by intake and observation. Intake form has been scanned into medical record. Patient has been instructed to contact their primary care physician regarding ROS issues if not already being addressed at this time. ASSESSMENT:   This patient presents with signs and symptoms consistent with the medical diagnosis provided by the referring physician. Impairments (physical, cognitive and/or psychosocial):  [x] Decreased mobility   [] Weakness    [] Hypersensitivity   [x] Pain/tenderness   [x] Edema/swelling   [] Decreased coordination (fine/gross motor)   [] Impaired body mechanics  [] Sensory loss  [] Loss of balance   [] Other:      Performance Deficits (to be addressed in plan of care):   [] Bathing    [x] Household Tasks (cooking/cleaning)   [] Dressing    [] Self Feeding   [] Grooming    [] Work/Education   [x] Functional Mobility   [] Sleeping/Rest   [] Toileting/Hygiene   [x] Recreational Activities   [] Driving    [] Community/Social Participation   [] Other:     Rehab Potential:   [] Excellent [x] Good [] Fair  [] Poor     Barriers affecting rehab potential:  []Age    []Lack of Motivation   []Co-Morbidities  []Cognitive Function  []Environmental/home/work barriers  []Other:     Tolerance of evaluation/treatment:    [] Excellent [x] Good [] Fair  [] Poor      PLAN OF CARE:  Interventions:   [x] Therapeutic Exercise [x] Therapeutic Activity    [x] Activities of Daily Living [x] Neuromuscular Re-education      [x] Patient Education  [x] Manual Therapy      [x] Modalities as needed, and not otherwise contraindicated, including: ultrasound,paraffin,moist heat/cold pack, electrical stimulation, contrast bath, iontophoresis  [x] Splinting    Frequency/Duration:  1 days per week for 4-6 weeks      GOALS:  Short Term Goals: To be achieved in: 2 weeks  1. Independent in HEP and progression per patient tolerance, in order to prevent re-injury. 2. Patient will have a decrease in pain to facilitate improvement in movement, function, and ADLs as indicated by Functional Deficits. Long Term Goals to be achieved in 6  weeks, including patient directed goals to address identified performance deficits:  1) Pt to be independent in graded HEP progression with a good level of effort and compliance. 2) Pt to report a score of 40 % or less on the Quick DASH disability questionnaire for increased performance with carrying, moving, and handling objects. 3) Pt will demonstrate increased flexion of middle and ring to [facility] by 1.0cm  for improved performance of driving, household tasks  4) Pt will demonstrate an increase is gross grasp strength to 75% of uninvolved hand for improvement of swimming and driving  5) Pt will have a decrease in pain to 1/10 with use to facilitate improvement in strength, movement, function, and ADLs.       OCCUPATIONAL THERAPY EVALUATION COMPLEXITY JUSTIFICATION:    [x] An occupational profile and medical/therapy history, which includes:   [x] a brief history including medical and/or therapy records relating to the     presenting problem   [] an expanded review of medical and/or therapy records and additional review     of physical, cognitive or psychosocial history related to current functional    performance   [] an extensive additional review of review of medical and/or therapy records   and physical, cognitive, or psychosocial history related to current    functional performance    [x] An assessment that identifies performance deficits (relating to physical, cognitive, or psychosocial skills) that result in activity limitations and/or participation restrictions:   [x] 1-3 performance deficits   [] 3-5 performance deficits   [] 5 or more performance deficits    [x] Clinical decision making of:   [x] low complexity, including analysis of occupational profile, data analysis from problem focused assessment, and consideration of a limited number of treatment options. No comorbidities affect occupational performance. No task modifications or assistance needed to complete evaluation. [] moderate complexity, including analysis of occupational profile, data analysis from detailed assessment and consideration of several treatment options. Comorbidities that affect occupational performance may be present. Minimal to moderate task modifications or assistance needed to complete assessment. [] high complexity, including analysis of occupational profile, analysis of data from comprehensive assessment and consideration of multiple treatment options. Multiple comorbidities present that affect occupational performance. Significant task modifications or assistance needed to complete assessment.     Evaluation Code:  [x] Low Complexity EVAL 44272 (typically 30 minutes face to face)  [] Mod Complexity EVAL 16768 (typically 45 minutes face to face)  [] High Complexity EVAL 40827 (typically 60 minutes face to face)        Electronically signed by:  Linda Carbajal OT/SMILEY, [facility]

## 2017-10-26 NOTE — FLOWSHEET NOTE
eval  [] Continue per plan of care [] Alter current plan (see comments)  [x] Plan of care initiated [] Hold pending MD visit [] Discharge    Electronically signed by: Jay George OT/L, CHT [id number]

## 2017-10-30 ENCOUNTER — OFFICE VISIT (OUTPATIENT)
Dept: ORTHOPEDIC SURGERY | Age: 72
End: 2017-10-30

## 2017-10-30 ENCOUNTER — HOSPITAL ENCOUNTER (OUTPATIENT)
Dept: OCCUPATIONAL THERAPY | Age: 72
Discharge: HOME | End: 2017-10-30
Admitting: ORTHOPAEDIC SURGERY

## 2017-10-30 VITALS — WEIGHT: 156.09 LBS | BODY MASS INDEX: 27.66 KG/M2 | HEIGHT: 63 IN

## 2017-10-30 DIAGNOSIS — M72.0 DUPUYTREN'S CONTRACTURE OF BOTH HANDS: Primary | ICD-10-CM

## 2017-10-30 PROCEDURE — 99024 POSTOP FOLLOW-UP VISIT: CPT | Performed by: ORTHOPAEDIC SURGERY

## 2017-10-30 NOTE — FLOWSHEET NOTE
[address] [place], [address]                Hand Therapy Daily Treatment Note  Date:  10/30/2017    Patient: Gavin Perry   : 1945   MRN: [id number]  Referring Physician: Referring Practitioner: Dr. Tanya Harrington Diagnosis Information:  Diagnosis: s/p R Dupuytrens release (M72.0)                                          Treatment Diagnosis: M79.643                                    Insurance information: OT Insurance Information: [facility] Medicare    Date of Injury:  Date of Surgery: 10/19/17      Visit # Insurance Allowable   2 Med necessity     Date of Patient follow up with Physician: 10/30/17    G-Code:  OT G-codes  Functional Assessment Tool Used: Quick DASH - 64%  Functional Limitation: Carrying, moving and handling objects  Carrying, Moving and Handling Objects Current Status (): At least 60 percent but less than 80 percent impaired, limited or restricted  Carrying, Moving and Handling Objects Goal Status (): At least 40 percent but less than 60 percent impaired, limited or restricted      Progress Note: []  Yes  [x]  No  Next due by: Visit #10      Latex Allergy:  [x]NO      []YES    Preferred Language for Healthcare:   [x]English       []other:    Pain level:  1/10     SUBJECTIVE: To clinic from doctor.  Progressing well    RESTRICTIONS/PRECAUTIONS:     OBJECTIVE:          Date:  10/26/17 10/30/17      Objective Measures:        Long AROM 0/75  0/75  0/50 0/85  0/95  0/55      Ring AROM 0/70  0/85  0/55 0/82  0/105  0/72              Modalities:                                Therapeutic Exercise, Activities, NMR:        HEP/pt education/wound care 25' review                                        Therapeutic Exercise and NMR:  [x] (85178) Provided verbal/tactile cueing for activities related to strengthening, flexibility, endurance, ROM  for improvements in scapular, orthotic/prosthetic use, established patient   [] (19368) Orthotic management and training (fitting and assessment)  [] Comments: minor splint adj    Charges:  Timed Code Treatment Minutes: 25   Total Treatment Minutes: 25     [] EVAL (LOW) 62028   [] OT Re-eval (52427)  [] EVAL (MOD) 03263   [] EVAL (HIGH) 45173       [x] Gerard (14364) x  2   [] VXHMJ(37106)  [] NMR (44304) x      [] Estim (attended) (42480)   [] Manual ([id number]) x       [] US (34051)  [] TA () x      [] Paraffin (60110)  [] ADL  ([phone] [id number]) x     [] Splint/L code:    [] Estim (unattended) (96050)  [] Other:    GOALS: Long Term Goals to be achieved in 6  weeks, including patient directed goals to address identified performance deficits:  1) Pt to be independent in graded HEP progression with a good level of effort and compliance. 2) Pt to report a score of 40 % or less on the Quick DASH disability questionnaire for increased performance with carrying, moving, and handling objects. 3) Pt will demonstrate increased flexion of middle and ring to [facility] by 1.0cm  for improved performance of driving, household tasks  4) Pt will demonstrate an increase is gross grasp strength to 75% of uninvolved hand for improvement of swimming and driving  5) Pt will have a decrease in pain to 1/10 with use to facilitate improvement in strength, movement, function, and ADLs. Progression Towards Functional goals:  [x] Patient is progressing as expected towards functional goals listed. [] Progression is slowed due to complexities listed. [] Progression has been slowed due to co-morbidities.   [] Plan just implemented, too soon to assess goals progression  [] Other:     ASSESSMENT:  MEJIA better on both fingers    Treatment/Activity Tolerance:  [x] Patient tolerated treatment well [] Patient limited by fatigue  [] Patient limited by pain  [] Patient limited by other medical complications  [] Other:     Prognosis: [x] Good [] Fair  [] Poor    Patient Requires Follow-up: [x] Yes  [] No    PLAN: See eval  [] Continue per plan of care [x] Alter current plan (see comments) To see in 2 weeks for strengthening  [] Plan of care initiated [] Hold pending MD visit [] Discharge    Electronically signed by: Kusum Guevara OT/L, CHT [id number]

## 2017-11-01 ENCOUNTER — HOSPITAL ENCOUNTER (OUTPATIENT)
Dept: OCCUPATIONAL THERAPY | Age: 72
Discharge: HOME | End: 2017-11-30
Attending: ORTHOPAEDIC SURGERY | Admitting: ORTHOPAEDIC SURGERY

## 2017-12-11 ENCOUNTER — OFFICE VISIT (OUTPATIENT)
Dept: ORTHOPEDIC SURGERY | Age: 72
End: 2017-12-11

## 2017-12-11 VITALS — BODY MASS INDEX: 27.66 KG/M2 | WEIGHT: 156.09 LBS | HEIGHT: 63 IN

## 2017-12-11 DIAGNOSIS — M72.0 DUPUYTREN'S CONTRACTURE OF BOTH HANDS: Primary | ICD-10-CM

## 2017-12-11 PROCEDURE — 99024 POSTOP FOLLOW-UP VISIT: CPT | Performed by: ORTHOPAEDIC SURGERY

## 2017-12-12 ENCOUNTER — TELEPHONE (OUTPATIENT)
Dept: INTERNAL MEDICINE CLINIC | Age: 72
End: 2017-12-12

## 2017-12-12 NOTE — TELEPHONE ENCOUNTER
[address] calling to check status on a Physician Query that needed to be completed by Dr. Heber Schaumann of patient Shai Lam. Form was faxed to the office on November 17th.     Please call [facility] at [phone] x [phone]    Reference #:  [id number]

## 2018-01-10 ENCOUNTER — TELEPHONE (OUTPATIENT)
Dept: CASE MANAGEMENT | Age: 73
End: 2018-01-10

## 2018-01-11 ENCOUNTER — OFFICE VISIT (OUTPATIENT)
Dept: DERMATOLOGY | Age: 73
End: 2018-01-11

## 2018-01-11 DIAGNOSIS — L57.0 ACTINIC KERATOSES: Primary | ICD-10-CM

## 2018-01-11 DIAGNOSIS — Z12.83 SCREENING EXAM FOR SKIN CANCER: ICD-10-CM

## 2018-01-11 DIAGNOSIS — L82.1 SEBORRHEIC KERATOSES: ICD-10-CM

## 2018-01-11 DIAGNOSIS — L82.0 INFLAMED SEBORRHEIC KERATOSIS: ICD-10-CM

## 2018-01-11 DIAGNOSIS — Z85.828 HISTORY OF NONMELANOMA SKIN CANCER: ICD-10-CM

## 2018-01-11 PROCEDURE — 4040F PNEUMOC VAC/ADMIN/RCVD: CPT | Performed by: DERMATOLOGY

## 2018-01-11 PROCEDURE — 1090F PRES/ABSN URINE INCON ASSESS: CPT | Performed by: DERMATOLOGY

## 2018-01-11 PROCEDURE — 3017F COLORECTAL CA SCREEN DOC REV: CPT | Performed by: DERMATOLOGY

## 2018-01-11 PROCEDURE — 17003 DESTRUCT PREMALG LES 2-14: CPT | Performed by: DERMATOLOGY

## 2018-01-11 PROCEDURE — 17000 DESTRUCT PREMALG LESION: CPT | Performed by: DERMATOLOGY

## 2018-01-11 PROCEDURE — 99213 OFFICE O/P EST LOW 20 MIN: CPT | Performed by: DERMATOLOGY

## 2018-01-11 PROCEDURE — G8427 DOCREV CUR MEDS BY ELIG CLIN: HCPCS | Performed by: DERMATOLOGY

## 2018-01-11 PROCEDURE — 17110 DESTRUCTION B9 LES UP TO 14: CPT | Performed by: DERMATOLOGY

## 2018-01-11 PROCEDURE — 1036F TOBACCO NON-USER: CPT | Performed by: DERMATOLOGY

## 2018-01-11 PROCEDURE — 1123F ACP DISCUSS/DSCN MKR DOCD: CPT | Performed by: DERMATOLOGY

## 2018-01-11 PROCEDURE — G8399 PT W/DXA RESULTS DOCUMENT: HCPCS | Performed by: DERMATOLOGY

## 2018-01-11 PROCEDURE — G8484 FLU IMMUNIZE NO ADMIN: HCPCS | Performed by: DERMATOLOGY

## 2018-01-11 PROCEDURE — G8417 CALC BMI ABV UP PARAM F/U: HCPCS | Performed by: DERMATOLOGY

## 2018-01-11 PROCEDURE — 3014F SCREEN MAMMO DOC REV: CPT | Performed by: DERMATOLOGY

## 2018-01-11 NOTE — PROGRESS NOTES
Taking for the 1/11/18 encounter (Office Visit) with Loida Mitchell MD   Medication Sig Dispense Refill    FLUoxetine (PROZAC) 20 MG capsule Take 1 capsule by mouth daily Depression medicine. 90 capsule 3    traZODone (DESYREL) 50 MG tablet TAKE ONE AND ONE-HALF TABLETS BY MOUTH EVERY NIGHT AT BEDTIME 135 tablet 3    pravastatin (PRAVACHOL) 20 MG tablet TAKE 1 TABLET BY MOUTH EVERY EVENING FOR HIGH CHOLESTEROL 30 tablet 9    buPROPion (WELLBUTRIN SR) 150 MG extended release tablet TAKE 1 TABLET BY MOUTH EVERY DAY FOR DEPRESSION 90 tablet 3    omeprazole (PRILOSEC) 20 MG capsule Take 20 mg by mouth daily      Cetirizine HCl (ZYRTEC PO) Take 1 tablet by mouth daily. Physical Examination     The following were examined and determined to be normal: Psych/Neuro, Scalp/hair, Conjunctivae/eyelids, Gums/teeth/lips, Neck, Abdomen, Back, RUE, LUE, RLE, LLE, Nails/digits and Genitalia/groin/buttocks. The following were examined and determined to be abnormal: Head/face and Breast/axilla/chest.    -General: Well-appearing, NAD  1. Nasal dorsum - scar clear  2. R 1 and L 1 temples, nasal bridge 1 - ill-defined irregularly-shaped roughly-scaling thin pink macules/papules   3. R superior shoulder - well-defined \"stuck-on\" verrucous tan-brown papule   4. L cheek - well-defined \"stuck-on\" verrucous tan-brown papule(s) w/ surrounding bright erythema     Assessment and Plan     1. History of NMSC - clear today  -Reviewed sun protective behavior -- sun avoidance during the peak hours of the day, sun-protective clothing (including hat and sunglasses), sunscreen use (water resistant, broad spectrum, SPF at least 30, need for reapplication every 2 to 3 hours), avoidance of tanning beds  -Full skin exam in 1 year (sooner if indicated)     2.  Actinic keratosis(es)  -Edu re: relationship with chronic cumulative sun exposure, low premalignant potential.   -3 lesion(s) treated w/ liquid nitrogen x 2 cycles - R 1 and L 1 temples,

## 2018-02-12 ENCOUNTER — HOSPITAL ENCOUNTER (OUTPATIENT)
Dept: GENERAL RADIOLOGY | Age: 73
Discharge: HOME | End: 2018-02-12
Attending: INTERNAL MEDICINE | Admitting: INTERNAL MEDICINE

## 2018-02-12 DIAGNOSIS — E78.5 HYPERLIPIDEMIA, UNSPECIFIED HYPERLIPIDEMIA TYPE: ICD-10-CM

## 2018-02-12 LAB
A/G RATIO: 1.6 (ref 1.1–2.2)
ALBUMIN SERPL-MCNC: 4.3 G/DL (ref 3.4–5)
ALP BLD-CCNC: 58 U/L (ref 40–129)
ALT SERPL-CCNC: 10 U/L (ref 10–40)
ANION GAP SERPL CALCULATED.3IONS-SCNC: 14 MMOL/L (ref 3–16)
AST SERPL-CCNC: 16 U/L (ref 15–37)
BILIRUB SERPL-MCNC: 0.4 MG/DL (ref 0–1)
BUN BLDV-MCNC: 18 MG/DL (ref 7–20)
CALCIUM SERPL-MCNC: 9.6 MG/DL (ref 8.3–10.6)
CHLORIDE BLD-SCNC: 106 MMOL/L (ref 99–110)
CHOLESTEROL, TOTAL: 179 MG/DL (ref 0–199)
CO2: 27 MMOL/L (ref 21–32)
CREAT SERPL-MCNC: 1.2 MG/DL (ref 0.6–1.2)
GFR AFRICAN AMERICAN: 53
GFR NON-AFRICAN AMERICAN: 44
GLOBULIN: 2.7 G/DL
GLUCOSE BLD-MCNC: 93 MG/DL (ref 70–99)
HDLC SERPL-MCNC: 60 MG/DL (ref 40–60)
LDL CHOLESTEROL CALCULATED: 93 MG/DL
POTASSIUM SERPL-SCNC: 4.8 MMOL/L (ref 3.5–5.1)
SODIUM BLD-SCNC: 147 MMOL/L (ref 136–145)
TOTAL PROTEIN: 7 G/DL (ref 6.4–8.2)
TRIGL SERPL-MCNC: 128 MG/DL (ref 0–150)
VLDLC SERPL CALC-MCNC: 26 MG/DL

## 2018-02-13 ENCOUNTER — OFFICE VISIT (OUTPATIENT)
Dept: INTERNAL MEDICINE CLINIC | Age: 73
End: 2018-02-13

## 2018-02-13 VITALS
SYSTOLIC BLOOD PRESSURE: 122 MMHG | HEART RATE: 86 BPM | WEIGHT: 158 LBS | OXYGEN SATURATION: 92 % | BODY MASS INDEX: 28 KG/M2 | DIASTOLIC BLOOD PRESSURE: 78 MMHG

## 2018-02-13 DIAGNOSIS — G89.29 CHRONIC MIDLINE LOW BACK PAIN WITHOUT SCIATICA: ICD-10-CM

## 2018-02-13 DIAGNOSIS — K58.9 IRRITABLE BOWEL SYNDROME WITHOUT DIARRHEA: ICD-10-CM

## 2018-02-13 DIAGNOSIS — M54.50 CHRONIC MIDLINE LOW BACK PAIN WITHOUT SCIATICA: ICD-10-CM

## 2018-02-13 DIAGNOSIS — G47.00 INSOMNIA, UNSPECIFIED TYPE: ICD-10-CM

## 2018-02-13 DIAGNOSIS — F32.A DEPRESSION, UNSPECIFIED DEPRESSION TYPE: ICD-10-CM

## 2018-02-13 DIAGNOSIS — M41.9 SCOLIOSIS, UNSPECIFIED SCOLIOSIS TYPE, UNSPECIFIED SPINAL REGION: ICD-10-CM

## 2018-02-13 DIAGNOSIS — E78.5 HYPERLIPIDEMIA, UNSPECIFIED HYPERLIPIDEMIA TYPE: Primary | ICD-10-CM

## 2018-02-13 DIAGNOSIS — M54.2 NECK PAIN: ICD-10-CM

## 2018-02-13 PROCEDURE — 99213 OFFICE O/P EST LOW 20 MIN: CPT | Performed by: INTERNAL MEDICINE

## 2018-02-13 PROCEDURE — 1123F ACP DISCUSS/DSCN MKR DOCD: CPT | Performed by: INTERNAL MEDICINE

## 2018-02-13 PROCEDURE — 1036F TOBACCO NON-USER: CPT | Performed by: INTERNAL MEDICINE

## 2018-02-13 PROCEDURE — G8427 DOCREV CUR MEDS BY ELIG CLIN: HCPCS | Performed by: INTERNAL MEDICINE

## 2018-02-13 PROCEDURE — G8399 PT W/DXA RESULTS DOCUMENT: HCPCS | Performed by: INTERNAL MEDICINE

## 2018-02-13 PROCEDURE — 1090F PRES/ABSN URINE INCON ASSESS: CPT | Performed by: INTERNAL MEDICINE

## 2018-02-13 PROCEDURE — G8484 FLU IMMUNIZE NO ADMIN: HCPCS | Performed by: INTERNAL MEDICINE

## 2018-02-13 PROCEDURE — G8417 CALC BMI ABV UP PARAM F/U: HCPCS | Performed by: INTERNAL MEDICINE

## 2018-02-13 PROCEDURE — 3014F SCREEN MAMMO DOC REV: CPT | Performed by: INTERNAL MEDICINE

## 2018-02-13 PROCEDURE — 3017F COLORECTAL CA SCREEN DOC REV: CPT | Performed by: INTERNAL MEDICINE

## 2018-02-13 PROCEDURE — 4040F PNEUMOC VAC/ADMIN/RCVD: CPT | Performed by: INTERNAL MEDICINE

## 2018-02-15 ENCOUNTER — HOSPITAL ENCOUNTER (OUTPATIENT)
Dept: OTHER | Age: 73
Discharge: HOME | End: 2018-02-16
Attending: INTERNAL MEDICINE | Admitting: INTERNAL MEDICINE

## 2018-02-15 ENCOUNTER — HOSPITAL ENCOUNTER (OUTPATIENT)
Dept: GENERAL RADIOLOGY | Age: 73
Discharge: HOME | End: 2018-02-16

## 2018-02-15 ENCOUNTER — HOSPITAL ENCOUNTER (OUTPATIENT)
Dept: GENERAL RADIOLOGY | Age: 73
Discharge: HOME | End: 2018-02-15

## 2018-02-15 DIAGNOSIS — M54.2 NECK PAIN: ICD-10-CM

## 2018-02-15 DIAGNOSIS — G89.29 CHRONIC MIDLINE LOW BACK PAIN WITHOUT SCIATICA: ICD-10-CM

## 2018-02-15 DIAGNOSIS — M54.50 CHRONIC MIDLINE LOW BACK PAIN WITHOUT SCIATICA: ICD-10-CM

## 2018-02-20 ENCOUNTER — TELEPHONE (OUTPATIENT)
Dept: INTERNAL MEDICINE CLINIC | Age: 73
End: 2018-02-20

## 2018-02-20 DIAGNOSIS — G89.29 CHRONIC MIDLINE LOW BACK PAIN WITHOUT SCIATICA: Primary | ICD-10-CM

## 2018-02-20 DIAGNOSIS — M54.2 NECK PAIN: ICD-10-CM

## 2018-02-20 DIAGNOSIS — M54.50 CHRONIC MIDLINE LOW BACK PAIN WITHOUT SCIATICA: Primary | ICD-10-CM

## 2018-02-27 ENCOUNTER — HOSPITAL ENCOUNTER (OUTPATIENT)
Dept: MAMMOGRAPHY | Age: 73
Discharge: HOME | End: 2018-02-27
Attending: INTERNAL MEDICINE | Admitting: INTERNAL MEDICINE

## 2018-02-27 DIAGNOSIS — Z12.31 ENCOUNTER FOR SCREENING MAMMOGRAM FOR BREAST CANCER: ICD-10-CM

## 2018-04-24 ENCOUNTER — OFFICE VISIT (OUTPATIENT)
Dept: INTERNAL MEDICINE CLINIC | Age: 73
End: 2018-04-24

## 2018-04-24 VITALS
SYSTOLIC BLOOD PRESSURE: 118 MMHG | HEART RATE: 85 BPM | BODY MASS INDEX: 27.11 KG/M2 | WEIGHT: 153 LBS | OXYGEN SATURATION: 97 % | TEMPERATURE: 98.7 F | DIASTOLIC BLOOD PRESSURE: 72 MMHG

## 2018-04-24 DIAGNOSIS — J20.9 ACUTE BRONCHITIS, UNSPECIFIED ORGANISM: Primary | ICD-10-CM

## 2018-04-24 DIAGNOSIS — E78.5 HYPERLIPIDEMIA, UNSPECIFIED HYPERLIPIDEMIA TYPE: ICD-10-CM

## 2018-04-24 PROCEDURE — G8510 SCR DEP NEG, NO PLAN REQD: HCPCS | Performed by: INTERNAL MEDICINE

## 2018-04-24 PROCEDURE — 4040F PNEUMOC VAC/ADMIN/RCVD: CPT | Performed by: INTERNAL MEDICINE

## 2018-04-24 PROCEDURE — G8417 CALC BMI ABV UP PARAM F/U: HCPCS | Performed by: INTERNAL MEDICINE

## 2018-04-24 PROCEDURE — 99213 OFFICE O/P EST LOW 20 MIN: CPT | Performed by: INTERNAL MEDICINE

## 2018-04-24 PROCEDURE — G8427 DOCREV CUR MEDS BY ELIG CLIN: HCPCS | Performed by: INTERNAL MEDICINE

## 2018-04-24 PROCEDURE — 1090F PRES/ABSN URINE INCON ASSESS: CPT | Performed by: INTERNAL MEDICINE

## 2018-04-24 PROCEDURE — 1123F ACP DISCUSS/DSCN MKR DOCD: CPT | Performed by: INTERNAL MEDICINE

## 2018-04-24 PROCEDURE — G8399 PT W/DXA RESULTS DOCUMENT: HCPCS | Performed by: INTERNAL MEDICINE

## 2018-04-24 PROCEDURE — 1036F TOBACCO NON-USER: CPT | Performed by: INTERNAL MEDICINE

## 2018-04-24 PROCEDURE — 3017F COLORECTAL CA SCREEN DOC REV: CPT | Performed by: INTERNAL MEDICINE

## 2018-04-24 RX ORDER — AZITHROMYCIN 250 MG/1
TABLET, FILM COATED ORAL
Qty: 1 PACKET | Refills: 0 | Status: SHIPPED | OUTPATIENT
Start: 2018-04-24 | End: 2018-05-04

## 2018-04-24 ASSESSMENT — PATIENT HEALTH QUESTIONNAIRE - PHQ9
1. LITTLE INTEREST OR PLEASURE IN DOING THINGS: 0
2. FEELING DOWN, DEPRESSED OR HOPELESS: 0
SUM OF ALL RESPONSES TO PHQ QUESTIONS 1-9: 0
SUM OF ALL RESPONSES TO PHQ9 QUESTIONS 1 & 2: 0

## 2018-08-21 ENCOUNTER — HOSPITAL ENCOUNTER (OUTPATIENT)
Age: 73
Discharge: HOME | End: 2018-08-21
Payer: MEDICARE

## 2018-08-21 DIAGNOSIS — E78.5 HYPERLIPIDEMIA, UNSPECIFIED HYPERLIPIDEMIA TYPE: ICD-10-CM

## 2018-08-21 LAB
A/G RATIO: 1.5 (ref 1.1–2.2)
ALBUMIN SERPL-MCNC: 4 G/DL (ref 3.4–5)
ALP BLD-CCNC: 52 U/L (ref 40–129)
ALT SERPL-CCNC: 8 U/L (ref 10–40)
ANION GAP SERPL CALCULATED.3IONS-SCNC: 11 MMOL/L (ref 3–16)
AST SERPL-CCNC: 17 U/L (ref 15–37)
BILIRUB SERPL-MCNC: 0.4 MG/DL (ref 0–1)
BUN BLDV-MCNC: 12 MG/DL (ref 7–20)
CALCIUM SERPL-MCNC: 9.1 MG/DL (ref 8.3–10.6)
CHLORIDE BLD-SCNC: 105 MMOL/L (ref 99–110)
CHOLESTEROL, TOTAL: 170 MG/DL (ref 0–199)
CO2: 27 MMOL/L (ref 21–32)
CREAT SERPL-MCNC: 1.2 MG/DL (ref 0.6–1.2)
GFR AFRICAN AMERICAN: 53
GFR NON-AFRICAN AMERICAN: 44
GLOBULIN: 2.7 G/DL
GLUCOSE BLD-MCNC: 89 MG/DL (ref 70–99)
HDLC SERPL-MCNC: 52 MG/DL (ref 40–60)
LDL CHOLESTEROL CALCULATED: 94 MG/DL
POTASSIUM SERPL-SCNC: 4.1 MMOL/L (ref 3.5–5.1)
SODIUM BLD-SCNC: 143 MMOL/L (ref 136–145)
TOTAL PROTEIN: 6.7 G/DL (ref 6.4–8.2)
TRIGL SERPL-MCNC: 122 MG/DL (ref 0–150)
VLDLC SERPL CALC-MCNC: 24 MG/DL

## 2018-08-21 PROCEDURE — 36415 COLL VENOUS BLD VENIPUNCTURE: CPT

## 2018-08-21 PROCEDURE — 80053 COMPREHEN METABOLIC PANEL: CPT

## 2018-08-21 PROCEDURE — 80061 LIPID PANEL: CPT

## 2018-08-23 ENCOUNTER — OFFICE VISIT (OUTPATIENT)
Dept: INTERNAL MEDICINE CLINIC | Age: 73
End: 2018-08-23

## 2018-08-23 VITALS
WEIGHT: 149 LBS | DIASTOLIC BLOOD PRESSURE: 70 MMHG | SYSTOLIC BLOOD PRESSURE: 122 MMHG | OXYGEN SATURATION: 96 % | HEART RATE: 91 BPM | BODY MASS INDEX: 26.4 KG/M2

## 2018-08-23 DIAGNOSIS — G47.00 INSOMNIA, UNSPECIFIED TYPE: ICD-10-CM

## 2018-08-23 DIAGNOSIS — F32.A DEPRESSION, UNSPECIFIED DEPRESSION TYPE: ICD-10-CM

## 2018-08-23 DIAGNOSIS — M41.9 SCOLIOSIS, UNSPECIFIED SCOLIOSIS TYPE, UNSPECIFIED SPINAL REGION: ICD-10-CM

## 2018-08-23 DIAGNOSIS — E78.5 HYPERLIPIDEMIA, UNSPECIFIED HYPERLIPIDEMIA TYPE: Primary | ICD-10-CM

## 2018-08-23 PROCEDURE — G8417 CALC BMI ABV UP PARAM F/U: HCPCS | Performed by: INTERNAL MEDICINE

## 2018-08-23 PROCEDURE — 3017F COLORECTAL CA SCREEN DOC REV: CPT | Performed by: INTERNAL MEDICINE

## 2018-08-23 PROCEDURE — 1036F TOBACCO NON-USER: CPT | Performed by: INTERNAL MEDICINE

## 2018-08-23 PROCEDURE — 4040F PNEUMOC VAC/ADMIN/RCVD: CPT | Performed by: INTERNAL MEDICINE

## 2018-08-23 PROCEDURE — G8427 DOCREV CUR MEDS BY ELIG CLIN: HCPCS | Performed by: INTERNAL MEDICINE

## 2018-08-23 PROCEDURE — 1123F ACP DISCUSS/DSCN MKR DOCD: CPT | Performed by: INTERNAL MEDICINE

## 2018-08-23 PROCEDURE — 1101F PT FALLS ASSESS-DOCD LE1/YR: CPT | Performed by: INTERNAL MEDICINE

## 2018-08-23 PROCEDURE — 1090F PRES/ABSN URINE INCON ASSESS: CPT | Performed by: INTERNAL MEDICINE

## 2018-08-23 PROCEDURE — 99213 OFFICE O/P EST LOW 20 MIN: CPT | Performed by: INTERNAL MEDICINE

## 2018-08-23 PROCEDURE — G8399 PT W/DXA RESULTS DOCUMENT: HCPCS | Performed by: INTERNAL MEDICINE

## 2018-12-28 RX ORDER — FLUOXETINE HYDROCHLORIDE 20 MG/1
CAPSULE ORAL
Qty: 90 CAPSULE | Refills: 0 | Status: SHIPPED | OUTPATIENT
Start: 2018-12-28 | End: 2019-03-31 | Stop reason: SDUPTHER

## 2019-01-17 ENCOUNTER — OFFICE VISIT (OUTPATIENT)
Dept: DERMATOLOGY | Age: 74
End: 2019-01-17
Payer: MEDICARE

## 2019-01-17 DIAGNOSIS — Z85.828 HISTORY OF NONMELANOMA SKIN CANCER: ICD-10-CM

## 2019-01-17 DIAGNOSIS — D48.5 NEOPLASM OF UNCERTAIN BEHAVIOR OF SKIN: Primary | ICD-10-CM

## 2019-01-17 DIAGNOSIS — Z12.83 SCREENING EXAM FOR SKIN CANCER: ICD-10-CM

## 2019-01-17 DIAGNOSIS — L57.0 ACTINIC KERATOSES: ICD-10-CM

## 2019-01-17 DIAGNOSIS — L72.0 MILIUM: ICD-10-CM

## 2019-01-17 PROCEDURE — 11102 TANGNTL BX SKIN SINGLE LES: CPT | Performed by: DERMATOLOGY

## 2019-01-17 PROCEDURE — 1101F PT FALLS ASSESS-DOCD LE1/YR: CPT | Performed by: DERMATOLOGY

## 2019-01-17 PROCEDURE — G8417 CALC BMI ABV UP PARAM F/U: HCPCS | Performed by: DERMATOLOGY

## 2019-01-17 PROCEDURE — 1123F ACP DISCUSS/DSCN MKR DOCD: CPT | Performed by: DERMATOLOGY

## 2019-01-17 PROCEDURE — 1036F TOBACCO NON-USER: CPT | Performed by: DERMATOLOGY

## 2019-01-17 PROCEDURE — G8399 PT W/DXA RESULTS DOCUMENT: HCPCS | Performed by: DERMATOLOGY

## 2019-01-17 PROCEDURE — 4040F PNEUMOC VAC/ADMIN/RCVD: CPT | Performed by: DERMATOLOGY

## 2019-01-17 PROCEDURE — 3017F COLORECTAL CA SCREEN DOC REV: CPT | Performed by: DERMATOLOGY

## 2019-01-17 PROCEDURE — G8484 FLU IMMUNIZE NO ADMIN: HCPCS | Performed by: DERMATOLOGY

## 2019-01-17 PROCEDURE — 17003 DESTRUCT PREMALG LES 2-14: CPT | Performed by: DERMATOLOGY

## 2019-01-17 PROCEDURE — 1090F PRES/ABSN URINE INCON ASSESS: CPT | Performed by: DERMATOLOGY

## 2019-01-17 PROCEDURE — G8427 DOCREV CUR MEDS BY ELIG CLIN: HCPCS | Performed by: DERMATOLOGY

## 2019-01-17 PROCEDURE — 17000 DESTRUCT PREMALG LESION: CPT | Performed by: DERMATOLOGY

## 2019-01-17 PROCEDURE — 99213 OFFICE O/P EST LOW 20 MIN: CPT | Performed by: DERMATOLOGY

## 2019-01-17 RX ORDER — CELECOXIB 200 MG/1
CAPSULE ORAL
Refills: 0 | COMMUNITY
Start: 2019-01-08 | End: 2020-09-29

## 2019-01-21 LAB — DERMATOLOGY PATHOLOGY REPORT: ABNORMAL

## 2019-01-25 ENCOUNTER — TELEPHONE (OUTPATIENT)
Dept: DERMATOLOGY | Age: 74
End: 2019-01-25

## 2019-02-20 ENCOUNTER — HOSPITAL ENCOUNTER (OUTPATIENT)
Age: 74
Discharge: HOME | End: 2019-02-20
Payer: MEDICARE

## 2019-02-20 DIAGNOSIS — E78.5 HYPERLIPIDEMIA, UNSPECIFIED HYPERLIPIDEMIA TYPE: ICD-10-CM

## 2019-02-20 LAB
A/G RATIO: 1.4 (ref 1.1–2.2)
ALBUMIN SERPL-MCNC: 4.1 G/DL (ref 3.4–5)
ALP BLD-CCNC: 62 U/L (ref 40–129)
ALT SERPL-CCNC: 11 U/L (ref 10–40)
ANION GAP SERPL CALCULATED.3IONS-SCNC: 12 MMOL/L (ref 3–16)
AST SERPL-CCNC: 18 U/L (ref 15–37)
BILIRUB SERPL-MCNC: 0.5 MG/DL (ref 0–1)
BUN BLDV-MCNC: 20 MG/DL (ref 7–20)
CALCIUM SERPL-MCNC: 9.4 MG/DL (ref 8.3–10.6)
CHLORIDE BLD-SCNC: 101 MMOL/L (ref 99–110)
CHOLESTEROL, TOTAL: 178 MG/DL (ref 0–199)
CO2: 28 MMOL/L (ref 21–32)
CREAT SERPL-MCNC: 1.4 MG/DL (ref 0.6–1.2)
GFR AFRICAN AMERICAN: 44
GFR NON-AFRICAN AMERICAN: 37
GLOBULIN: 2.9 G/DL
GLUCOSE BLD-MCNC: 83 MG/DL (ref 70–99)
HDLC SERPL-MCNC: 66 MG/DL (ref 40–60)
LDL CHOLESTEROL CALCULATED: 92 MG/DL
POTASSIUM SERPL-SCNC: 4.1 MMOL/L (ref 3.5–5.1)
SODIUM BLD-SCNC: 141 MMOL/L (ref 136–145)
TOTAL PROTEIN: 7 G/DL (ref 6.4–8.2)
TRIGL SERPL-MCNC: 101 MG/DL (ref 0–150)
VLDLC SERPL CALC-MCNC: 20 MG/DL

## 2019-02-20 PROCEDURE — 80061 LIPID PANEL: CPT

## 2019-02-20 PROCEDURE — 80053 COMPREHEN METABOLIC PANEL: CPT

## 2019-02-20 PROCEDURE — 36415 COLL VENOUS BLD VENIPUNCTURE: CPT

## 2019-02-21 ENCOUNTER — OFFICE VISIT (OUTPATIENT)
Dept: INTERNAL MEDICINE CLINIC | Age: 74
End: 2019-02-21
Payer: MEDICARE

## 2019-02-21 VITALS
WEIGHT: 147 LBS | BODY MASS INDEX: 25.1 KG/M2 | DIASTOLIC BLOOD PRESSURE: 76 MMHG | SYSTOLIC BLOOD PRESSURE: 128 MMHG | HEIGHT: 64 IN | OXYGEN SATURATION: 91 % | HEART RATE: 91 BPM

## 2019-02-21 DIAGNOSIS — J47.9 BRONCHIECTASIS WITHOUT COMPLICATION (HCC): ICD-10-CM

## 2019-02-21 DIAGNOSIS — F32.5 MAJOR DEPRESSIVE DISORDER IN FULL REMISSION, UNSPECIFIED WHETHER RECURRENT (HCC): ICD-10-CM

## 2019-02-21 DIAGNOSIS — K21.00 GASTROESOPHAGEAL REFLUX DISEASE WITH ESOPHAGITIS: ICD-10-CM

## 2019-02-21 DIAGNOSIS — N18.30 CHRONIC KIDNEY DISEASE, STAGE III (MODERATE) (HCC): ICD-10-CM

## 2019-02-21 DIAGNOSIS — N28.9 RENAL INSUFFICIENCY: ICD-10-CM

## 2019-02-21 DIAGNOSIS — E78.5 HYPERLIPIDEMIA, UNSPECIFIED HYPERLIPIDEMIA TYPE: Primary | ICD-10-CM

## 2019-02-21 DIAGNOSIS — F32.A DEPRESSION, UNSPECIFIED DEPRESSION TYPE: ICD-10-CM

## 2019-02-21 PROCEDURE — G8399 PT W/DXA RESULTS DOCUMENT: HCPCS | Performed by: INTERNAL MEDICINE

## 2019-02-21 PROCEDURE — 1101F PT FALLS ASSESS-DOCD LE1/YR: CPT | Performed by: INTERNAL MEDICINE

## 2019-02-21 PROCEDURE — 99214 OFFICE O/P EST MOD 30 MIN: CPT | Performed by: INTERNAL MEDICINE

## 2019-02-21 PROCEDURE — 1036F TOBACCO NON-USER: CPT | Performed by: INTERNAL MEDICINE

## 2019-02-21 PROCEDURE — G8427 DOCREV CUR MEDS BY ELIG CLIN: HCPCS | Performed by: INTERNAL MEDICINE

## 2019-02-21 PROCEDURE — G8484 FLU IMMUNIZE NO ADMIN: HCPCS | Performed by: INTERNAL MEDICINE

## 2019-02-21 PROCEDURE — 1123F ACP DISCUSS/DSCN MKR DOCD: CPT | Performed by: INTERNAL MEDICINE

## 2019-02-21 PROCEDURE — G8417 CALC BMI ABV UP PARAM F/U: HCPCS | Performed by: INTERNAL MEDICINE

## 2019-02-21 PROCEDURE — 3017F COLORECTAL CA SCREEN DOC REV: CPT | Performed by: INTERNAL MEDICINE

## 2019-02-21 PROCEDURE — 1090F PRES/ABSN URINE INCON ASSESS: CPT | Performed by: INTERNAL MEDICINE

## 2019-02-21 PROCEDURE — 4040F PNEUMOC VAC/ADMIN/RCVD: CPT | Performed by: INTERNAL MEDICINE

## 2019-03-14 ENCOUNTER — PROCEDURE VISIT (OUTPATIENT)
Dept: DERMATOLOGY | Age: 74
End: 2019-03-14
Payer: MEDICARE

## 2019-03-14 VITALS
WEIGHT: 147.02 LBS | DIASTOLIC BLOOD PRESSURE: 76 MMHG | SYSTOLIC BLOOD PRESSURE: 117 MMHG | HEART RATE: 80 BPM | BODY MASS INDEX: 25.63 KG/M2

## 2019-03-14 DIAGNOSIS — C44.519 BASAL CELL CARCINOMA (BCC) OF UPPER BACK: Primary | ICD-10-CM

## 2019-03-14 PROCEDURE — 11602 EXC TR-EXT MAL+MARG 1.1-2 CM: CPT | Performed by: DERMATOLOGY

## 2019-03-14 PROCEDURE — 12032 INTMD RPR S/A/T/EXT 2.6-7.5: CPT | Performed by: DERMATOLOGY

## 2019-03-15 DIAGNOSIS — E78.5 HYPERLIPIDEMIA: ICD-10-CM

## 2019-03-15 RX ORDER — PRAVASTATIN SODIUM 20 MG
TABLET ORAL
Qty: 30 TABLET | Refills: 0 | Status: SHIPPED | OUTPATIENT
Start: 2019-03-15 | End: 2019-03-15 | Stop reason: SDUPTHER

## 2019-03-18 ENCOUNTER — HOSPITAL ENCOUNTER (OUTPATIENT)
Dept: WOMENS IMAGING | Age: 74
Discharge: HOME | End: 2019-03-18
Payer: MEDICARE

## 2019-03-18 DIAGNOSIS — Z12.39 BREAST CANCER SCREENING: ICD-10-CM

## 2019-03-18 PROCEDURE — 77063 BREAST TOMOSYNTHESIS BI: CPT

## 2019-03-19 LAB — DERMATOLOGY PATHOLOGY REPORT: ABNORMAL

## 2019-03-28 ENCOUNTER — NURSE ONLY (OUTPATIENT)
Dept: DERMATOLOGY | Age: 74
End: 2019-03-28

## 2019-03-28 DIAGNOSIS — C44.519 BASAL CELL CARCINOMA (BCC) OF UPPER BACK: Primary | ICD-10-CM

## 2019-03-28 PROCEDURE — 99024 POSTOP FOLLOW-UP VISIT: CPT | Performed by: DERMATOLOGY

## 2019-04-09 RX ORDER — BUPROPION HYDROCHLORIDE 150 MG/1
TABLET, EXTENDED RELEASE ORAL
Qty: 90 TABLET | Refills: 3 | Status: SHIPPED | OUTPATIENT
Start: 2019-04-09 | End: 2020-04-09

## 2019-04-09 NOTE — TELEPHONE ENCOUNTER
Refill request for wellbutrin  medication.      Name of Pharmacy- [address] [place] [address]      Last visit - 2/21/19     Pending visit - 8/27/19    Last refill -3/9/18      Medication Contract signed -   Last Oarrs ran-         Additional Comments

## 2019-08-26 ENCOUNTER — HOSPITAL ENCOUNTER (OUTPATIENT)
Age: 74
Discharge: HOME | End: 2019-08-26
Payer: MEDICARE

## 2019-08-26 DIAGNOSIS — E78.5 HYPERLIPIDEMIA, UNSPECIFIED HYPERLIPIDEMIA TYPE: ICD-10-CM

## 2019-08-26 LAB
A/G RATIO: 1.5 (ref 1.1–2.2)
ALBUMIN SERPL-MCNC: 4.3 G/DL (ref 3.4–5)
ALP BLD-CCNC: 56 U/L (ref 40–129)
ALT SERPL-CCNC: 12 U/L (ref 10–40)
ANION GAP SERPL CALCULATED.3IONS-SCNC: 13 MMOL/L (ref 3–16)
AST SERPL-CCNC: 19 U/L (ref 15–37)
BILIRUB SERPL-MCNC: 0.4 MG/DL (ref 0–1)
BUN BLDV-MCNC: 17 MG/DL (ref 7–20)
CALCIUM SERPL-MCNC: 9.4 MG/DL (ref 8.3–10.6)
CHLORIDE BLD-SCNC: 103 MMOL/L (ref 99–110)
CHOLESTEROL, TOTAL: 182 MG/DL (ref 0–199)
CO2: 27 MMOL/L (ref 21–32)
CREAT SERPL-MCNC: 1.2 MG/DL (ref 0.6–1.2)
GFR AFRICAN AMERICAN: 53
GFR NON-AFRICAN AMERICAN: 44
GLOBULIN: 2.8 G/DL
GLUCOSE BLD-MCNC: 83 MG/DL (ref 70–99)
HDLC SERPL-MCNC: 59 MG/DL (ref 40–60)
LDL CHOLESTEROL CALCULATED: 99 MG/DL
POTASSIUM SERPL-SCNC: 4.6 MMOL/L (ref 3.5–5.1)
SODIUM BLD-SCNC: 143 MMOL/L (ref 136–145)
TOTAL PROTEIN: 7.1 G/DL (ref 6.4–8.2)
TRIGL SERPL-MCNC: 122 MG/DL (ref 0–150)
VLDLC SERPL CALC-MCNC: 24 MG/DL

## 2019-08-26 PROCEDURE — 36415 COLL VENOUS BLD VENIPUNCTURE: CPT

## 2019-08-26 PROCEDURE — 80061 LIPID PANEL: CPT

## 2019-08-26 PROCEDURE — 80053 COMPREHEN METABOLIC PANEL: CPT

## 2019-08-27 ENCOUNTER — PATIENT MESSAGE (OUTPATIENT)
Dept: INTERNAL MEDICINE CLINIC | Age: 74
End: 2019-08-27

## 2019-08-27 ENCOUNTER — OFFICE VISIT (OUTPATIENT)
Dept: INTERNAL MEDICINE CLINIC | Age: 74
End: 2019-08-27
Payer: MEDICARE

## 2019-08-27 VITALS
SYSTOLIC BLOOD PRESSURE: 124 MMHG | WEIGHT: 153.8 LBS | RESPIRATION RATE: 16 BRPM | DIASTOLIC BLOOD PRESSURE: 72 MMHG | HEIGHT: 64 IN | HEART RATE: 89 BPM | OXYGEN SATURATION: 97 % | BODY MASS INDEX: 26.26 KG/M2

## 2019-08-27 DIAGNOSIS — F32.A DEPRESSION, UNSPECIFIED DEPRESSION TYPE: ICD-10-CM

## 2019-08-27 DIAGNOSIS — G47.00 INSOMNIA, UNSPECIFIED TYPE: ICD-10-CM

## 2019-08-27 DIAGNOSIS — K21.00 GASTROESOPHAGEAL REFLUX DISEASE WITH ESOPHAGITIS: ICD-10-CM

## 2019-08-27 DIAGNOSIS — E78.5 HYPERLIPIDEMIA, UNSPECIFIED HYPERLIPIDEMIA TYPE: Primary | ICD-10-CM

## 2019-08-27 DIAGNOSIS — N28.9 RENAL INSUFFICIENCY: ICD-10-CM

## 2019-08-27 PROCEDURE — G8417 CALC BMI ABV UP PARAM F/U: HCPCS | Performed by: NURSE PRACTITIONER

## 2019-08-27 PROCEDURE — G8427 DOCREV CUR MEDS BY ELIG CLIN: HCPCS | Performed by: NURSE PRACTITIONER

## 2019-08-27 PROCEDURE — 99214 OFFICE O/P EST MOD 30 MIN: CPT | Performed by: NURSE PRACTITIONER

## 2019-08-27 PROCEDURE — 3017F COLORECTAL CA SCREEN DOC REV: CPT | Performed by: NURSE PRACTITIONER

## 2019-08-27 PROCEDURE — G8399 PT W/DXA RESULTS DOCUMENT: HCPCS | Performed by: NURSE PRACTITIONER

## 2019-08-27 PROCEDURE — 1123F ACP DISCUSS/DSCN MKR DOCD: CPT | Performed by: NURSE PRACTITIONER

## 2019-08-27 PROCEDURE — 1036F TOBACCO NON-USER: CPT | Performed by: NURSE PRACTITIONER

## 2019-08-27 PROCEDURE — 4040F PNEUMOC VAC/ADMIN/RCVD: CPT | Performed by: NURSE PRACTITIONER

## 2019-08-27 PROCEDURE — 1090F PRES/ABSN URINE INCON ASSESS: CPT | Performed by: NURSE PRACTITIONER

## 2019-08-27 ASSESSMENT — PATIENT HEALTH QUESTIONNAIRE - PHQ9
SUM OF ALL RESPONSES TO PHQ QUESTIONS 1-9: 0
SUM OF ALL RESPONSES TO PHQ QUESTIONS 1-9: 0
2. FEELING DOWN, DEPRESSED OR HOPELESS: 0
1. LITTLE INTEREST OR PLEASURE IN DOING THINGS: 0
SUM OF ALL RESPONSES TO PHQ9 QUESTIONS 1 & 2: 0

## 2019-08-27 NOTE — PROGRESS NOTES
AND CURETTAGE OF UTERUS      x 2    HAND SURGERY Right 10/19/2017    dupuytren's disease excision including middle and ring finger    KNEE ARTHROSCOPY      MOHS SURGERY      squamous cell    SKIN BIOPSY  11/19/2012    squamous cell nose    TONSILLECTOMY AND ADENOIDECTOMY         Family History   Problem Relation Age of Onset    High Blood Pressure Mother     Heart Disease Mother [de-identified]        CABG    Diabetes Mother     Heart Disease Maternal Grandmother        Social History     Socioeconomic History    Marital status:      Spouse name: Not on file    Number of children: Not on file    Years of education: Not on file    Highest education level: Not on file   Occupational History    Not on file   Social Needs    Financial resource strain: Not on file    Food insecurity:     Worry: Not on file     Inability: Not on file    Transportation needs:     Medical: Not on file     Non-medical: Not on file   Tobacco Use    Smoking status: Never Smoker    Smokeless tobacco: Never Used   Substance and Sexual Activity    Alcohol use: No    Drug use: No    Sexual activity: Not on file   Lifestyle    Physical activity:     Days per week: Not on file     Minutes per session: Not on file    Stress: Not on file   Relationships    Social connections:     Talks on phone: Not on file     Gets together: Not on file     Attends Restoration service: Not on file     Active member of club or organization: Not on file     Attends meetings of clubs or organizations: Not on file     Relationship status: Not on file    Intimate partner violence:     Fear of current or ex partner: Not on file     Emotionally abused: Not on file     Physically abused: Not on file     Forced sexual activity: Not on file   Other Topics Concern    Not on file   Social History Narrative    Not on file       Review of Systems   Constitutional: Negative for chills and fever.    Respiratory: Negative for cough, chest tightness and shortness of

## 2019-08-28 ASSESSMENT — ENCOUNTER SYMPTOMS
NAUSEA: 0
SHORTNESS OF BREATH: 0
CONSTIPATION: 0
ABDOMINAL PAIN: 0
VOMITING: 0
CHEST TIGHTNESS: 0
ALLERGIC/IMMUNOLOGIC NEGATIVE: 1
COUGH: 0
DIARRHEA: 0

## 2019-09-19 ENCOUNTER — OFFICE VISIT (OUTPATIENT)
Dept: DERMATOLOGY | Age: 74
End: 2019-09-19
Payer: MEDICARE

## 2019-09-19 DIAGNOSIS — L57.0 ACTINIC KERATOSES: Primary | ICD-10-CM

## 2019-09-19 DIAGNOSIS — Z12.83 SCREENING EXAM FOR SKIN CANCER: ICD-10-CM

## 2019-09-19 DIAGNOSIS — L82.0 INFLAMED SEBORRHEIC KERATOSIS: ICD-10-CM

## 2019-09-19 DIAGNOSIS — Z85.828 HISTORY OF NONMELANOMA SKIN CANCER: ICD-10-CM

## 2019-09-19 PROCEDURE — 1036F TOBACCO NON-USER: CPT | Performed by: DERMATOLOGY

## 2019-09-19 PROCEDURE — 3017F COLORECTAL CA SCREEN DOC REV: CPT | Performed by: DERMATOLOGY

## 2019-09-19 PROCEDURE — 1090F PRES/ABSN URINE INCON ASSESS: CPT | Performed by: DERMATOLOGY

## 2019-09-19 PROCEDURE — 17110 DESTRUCTION B9 LES UP TO 14: CPT | Performed by: DERMATOLOGY

## 2019-09-19 PROCEDURE — G8427 DOCREV CUR MEDS BY ELIG CLIN: HCPCS | Performed by: DERMATOLOGY

## 2019-09-19 PROCEDURE — G8417 CALC BMI ABV UP PARAM F/U: HCPCS | Performed by: DERMATOLOGY

## 2019-09-19 PROCEDURE — 1123F ACP DISCUSS/DSCN MKR DOCD: CPT | Performed by: DERMATOLOGY

## 2019-09-19 PROCEDURE — G8399 PT W/DXA RESULTS DOCUMENT: HCPCS | Performed by: DERMATOLOGY

## 2019-09-19 PROCEDURE — 17000 DESTRUCT PREMALG LESION: CPT | Performed by: DERMATOLOGY

## 2019-09-19 PROCEDURE — 99213 OFFICE O/P EST LOW 20 MIN: CPT | Performed by: DERMATOLOGY

## 2019-09-19 PROCEDURE — 17003 DESTRUCT PREMALG LES 2-14: CPT | Performed by: DERMATOLOGY

## 2019-09-19 PROCEDURE — 4040F PNEUMOC VAC/ADMIN/RCVD: CPT | Performed by: DERMATOLOGY

## 2020-01-10 RX ORDER — PRAVASTATIN SODIUM 20 MG
TABLET ORAL
Qty: 90 TABLET | Refills: 2 | Status: SHIPPED | OUTPATIENT
Start: 2020-01-10 | End: 2020-10-21 | Stop reason: SDUPTHER

## 2020-01-10 NOTE — TELEPHONE ENCOUNTER
Patient states she is currently taking Pravastatin 20mg, was last filled in Oct. 2019 for 90 days at [facility]

## 2020-02-25 ENCOUNTER — HOSPITAL ENCOUNTER (OUTPATIENT)
Age: 75
Discharge: HOME | End: 2020-02-25
Payer: MEDICARE

## 2020-02-25 LAB
A/G RATIO: 1.3 (ref 1.1–2.2)
ALBUMIN SERPL-MCNC: 4 G/DL (ref 3.4–5)
ALP BLD-CCNC: 59 U/L (ref 40–129)
ALT SERPL-CCNC: 11 U/L (ref 10–40)
ANION GAP SERPL CALCULATED.3IONS-SCNC: 10 MMOL/L (ref 3–16)
AST SERPL-CCNC: 18 U/L (ref 15–37)
BASOPHILS ABSOLUTE: 0 K/UL (ref 0–0.2)
BASOPHILS RELATIVE PERCENT: 0.7 %
BILIRUB SERPL-MCNC: 0.4 MG/DL (ref 0–1)
BUN BLDV-MCNC: 16 MG/DL (ref 7–20)
CALCIUM SERPL-MCNC: 9.4 MG/DL (ref 8.3–10.6)
CHLORIDE BLD-SCNC: 102 MMOL/L (ref 99–110)
CHOLESTEROL, TOTAL: 175 MG/DL (ref 0–199)
CO2: 28 MMOL/L (ref 21–32)
CREAT SERPL-MCNC: 1.2 MG/DL (ref 0.6–1.2)
EOSINOPHILS ABSOLUTE: 0.2 K/UL (ref 0–0.6)
EOSINOPHILS RELATIVE PERCENT: 2.3 %
GFR AFRICAN AMERICAN: 53
GFR NON-AFRICAN AMERICAN: 44
GLOBULIN: 3.1 G/DL
GLUCOSE FASTING: 95 MG/DL (ref 70–99)
HCT VFR BLD CALC: 40.1 % (ref 36–48)
HDLC SERPL-MCNC: 58 MG/DL (ref 40–60)
HEMOGLOBIN: 13.6 G/DL (ref 12–16)
LDL CHOLESTEROL CALCULATED: 89 MG/DL
LYMPHOCYTES ABSOLUTE: 2.4 K/UL (ref 1–5.1)
LYMPHOCYTES RELATIVE PERCENT: 34 %
MCH RBC QN AUTO: 30.8 PG (ref 26–34)
MCHC RBC AUTO-ENTMCNC: 33.9 G/DL (ref 31–36)
MCV RBC AUTO: 90.9 FL (ref 80–100)
MONOCYTES ABSOLUTE: 0.5 K/UL (ref 0–1.3)
MONOCYTES RELATIVE PERCENT: 7.9 %
NEUTROPHILS ABSOLUTE: 3.8 K/UL (ref 1.7–7.7)
NEUTROPHILS RELATIVE PERCENT: 55.1 %
PDW BLD-RTO: 13.2 % (ref 12.4–15.4)
PLATELET # BLD: 208 K/UL (ref 135–450)
PMV BLD AUTO: 7.7 FL (ref 5–10.5)
POTASSIUM SERPL-SCNC: 4.3 MMOL/L (ref 3.5–5.1)
RBC # BLD: 4.41 M/UL (ref 4–5.2)
SODIUM BLD-SCNC: 140 MMOL/L (ref 136–145)
TOTAL PROTEIN: 7.1 G/DL (ref 6.4–8.2)
TRIGL SERPL-MCNC: 140 MG/DL (ref 0–150)
VLDLC SERPL CALC-MCNC: 28 MG/DL
WBC # BLD: 6.9 K/UL (ref 4–11)

## 2020-02-25 PROCEDURE — 80053 COMPREHEN METABOLIC PANEL: CPT

## 2020-02-25 PROCEDURE — 80061 LIPID PANEL: CPT

## 2020-02-25 PROCEDURE — 85025 COMPLETE CBC W/AUTO DIFF WBC: CPT

## 2020-02-25 PROCEDURE — 36415 COLL VENOUS BLD VENIPUNCTURE: CPT

## 2020-02-27 ENCOUNTER — OFFICE VISIT (OUTPATIENT)
Dept: INTERNAL MEDICINE CLINIC | Age: 75
End: 2020-02-27
Payer: MEDICARE

## 2020-02-27 VITALS
HEART RATE: 80 BPM | OXYGEN SATURATION: 95 % | DIASTOLIC BLOOD PRESSURE: 80 MMHG | SYSTOLIC BLOOD PRESSURE: 118 MMHG | WEIGHT: 153 LBS | BODY MASS INDEX: 26.26 KG/M2

## 2020-02-27 PROBLEM — F32.5 MAJOR DEPRESSIVE DISORDER IN FULL REMISSION (HCC): Status: ACTIVE | Noted: 2020-02-27

## 2020-02-27 PROCEDURE — 1036F TOBACCO NON-USER: CPT | Performed by: INTERNAL MEDICINE

## 2020-02-27 PROCEDURE — 1090F PRES/ABSN URINE INCON ASSESS: CPT | Performed by: INTERNAL MEDICINE

## 2020-02-27 PROCEDURE — G8484 FLU IMMUNIZE NO ADMIN: HCPCS | Performed by: INTERNAL MEDICINE

## 2020-02-27 PROCEDURE — 99214 OFFICE O/P EST MOD 30 MIN: CPT | Performed by: INTERNAL MEDICINE

## 2020-02-27 PROCEDURE — 4040F PNEUMOC VAC/ADMIN/RCVD: CPT | Performed by: INTERNAL MEDICINE

## 2020-02-27 PROCEDURE — G8399 PT W/DXA RESULTS DOCUMENT: HCPCS | Performed by: INTERNAL MEDICINE

## 2020-02-27 PROCEDURE — 3288F FALL RISK ASSESSMENT DOCD: CPT | Performed by: INTERNAL MEDICINE

## 2020-02-27 PROCEDURE — 1123F ACP DISCUSS/DSCN MKR DOCD: CPT | Performed by: INTERNAL MEDICINE

## 2020-02-27 PROCEDURE — G8417 CALC BMI ABV UP PARAM F/U: HCPCS | Performed by: INTERNAL MEDICINE

## 2020-02-27 PROCEDURE — G8510 SCR DEP NEG, NO PLAN REQD: HCPCS | Performed by: INTERNAL MEDICINE

## 2020-02-27 PROCEDURE — G8427 DOCREV CUR MEDS BY ELIG CLIN: HCPCS | Performed by: INTERNAL MEDICINE

## 2020-02-27 RX ORDER — TRAZODONE HYDROCHLORIDE 50 MG/1
TABLET ORAL
Qty: 135 TABLET | Refills: 3 | Status: SHIPPED | OUTPATIENT
Start: 2020-02-27 | End: 2021-03-01

## 2020-02-27 ASSESSMENT — PATIENT HEALTH QUESTIONNAIRE - PHQ9
1. LITTLE INTEREST OR PLEASURE IN DOING THINGS: 0
SUM OF ALL RESPONSES TO PHQ QUESTIONS 1-9: 0
SUM OF ALL RESPONSES TO PHQ9 QUESTIONS 1 & 2: 0
SUM OF ALL RESPONSES TO PHQ QUESTIONS 1-9: 0
2. FEELING DOWN, DEPRESSED OR HOPELESS: 0

## 2020-02-27 NOTE — PROGRESS NOTES
2020     Luana Gonzales (:  1945) is a 66 y.o. female, here for evaluation of the following medical concerns:  CC: Hyperlipidemia  HPI  Patient with hyperlipidemia, CKD, reflux, chronic neck pain, chronic low back pain. Reviewed: Office visit: 2019: [place] [facility]: Dr. Bobo Noyola history of NMSC, actinic keratosis, seborrheic keratosis. .  Patient with history of reflux. Using Prilosec once a day but notes occasional breakthrough times. Social history notable for coffee but states about 1/3 of a cup a day. Soda: None. Alcohol: None. Tobacco: None. Patient notes discomfort behind her left ear. Worse with opening her jaw. Head tilt. She denies being a gum chewer or crushing eyes. Reviewed laboratory data 2020. Chemistry panel: Creatinine: 1.2. GFR 44. Lipid panel: Total cholesterol: 175. LDL cholesterol: 89. CBC: Unremarkable. Depression doing well on Prozac and Wellbutrin. Review of Systems   Review of Systems   Constitutional: negative   HENT: Tenderness behind the left ear as noted above. EYES: negative   Respiratory: negative   Gastrointestinal: Reflux with breakthrough symptoms despite present treatment  Endocrine: negative   Musculoskeletal: Chronic neck and back pains. Skin: negative   Allergic/Immunological: negative   Hematological: negative   Psychiatric/Behavorial: Depression doing well on Prozac and Wellbutrin. CV: negative   CNS: negative   :Negative   S/E:Negative  Renal: Creatinine 1.2 borderline elevated         Prior to Visit Medications    Medication Sig Taking?  Authorizing Provider   traZODone (DESYREL) 50 MG tablet TAKE ONE AND ONE-HALF TABLETS BY MOUTH EVERY NIGHT AT BEDTIME Yes Priya Rowe MD   pravastatin (PRAVACHOL) 20 MG tablet TAKE 1 TABLET BY MOUTH EVERY EVENING FOR HIGH CHOLESTEROL Yes Priya Rowe MD   FLUoxetine (PROZAC) 20 MG capsule TAKE ONE CAPSULE BY MOUTH EVERY DAY Yes Priya Rowe MD   buPROPion (WELLBUTRIN SR) 150 MG extended release

## 2020-04-06 ENCOUNTER — TELEPHONE (OUTPATIENT)
Dept: INTERNAL MEDICINE CLINIC | Age: 75
End: 2020-04-06

## 2020-07-28 ENCOUNTER — HOSPITAL ENCOUNTER (OUTPATIENT)
Dept: WOMENS IMAGING | Age: 75
Discharge: HOME | End: 2020-07-28
Payer: MEDICARE

## 2020-07-28 PROCEDURE — 77063 BREAST TOMOSYNTHESIS BI: CPT

## 2020-09-08 ENCOUNTER — HOSPITAL ENCOUNTER (OUTPATIENT)
Age: 75
Discharge: HOME | End: 2020-09-08
Payer: MEDICARE

## 2020-09-08 LAB
A/G RATIO: 1.6 (ref 1.1–2.2)
ALBUMIN SERPL-MCNC: 4 G/DL (ref 3.4–5)
ALP BLD-CCNC: 49 U/L (ref 40–129)
ALT SERPL-CCNC: 12 U/L (ref 10–40)
ANION GAP SERPL CALCULATED.3IONS-SCNC: 12 MMOL/L (ref 3–16)
AST SERPL-CCNC: 19 U/L (ref 15–37)
BILIRUB SERPL-MCNC: 0.5 MG/DL (ref 0–1)
BUN BLDV-MCNC: 17 MG/DL (ref 7–20)
CALCIUM SERPL-MCNC: 9.6 MG/DL (ref 8.3–10.6)
CHLORIDE BLD-SCNC: 102 MMOL/L (ref 99–110)
CHOLESTEROL, TOTAL: 168 MG/DL (ref 0–199)
CO2: 26 MMOL/L (ref 21–32)
CREAT SERPL-MCNC: 1.2 MG/DL (ref 0.6–1.2)
GFR AFRICAN AMERICAN: 53
GFR NON-AFRICAN AMERICAN: 44
GLOBULIN: 2.5 G/DL
GLUCOSE BLD-MCNC: 85 MG/DL (ref 70–99)
HDLC SERPL-MCNC: 54 MG/DL (ref 40–60)
LDL CHOLESTEROL CALCULATED: 90 MG/DL
POTASSIUM SERPL-SCNC: 4.3 MMOL/L (ref 3.5–5.1)
SODIUM BLD-SCNC: 140 MMOL/L (ref 136–145)
TOTAL PROTEIN: 6.5 G/DL (ref 6.4–8.2)
TRIGL SERPL-MCNC: 120 MG/DL (ref 0–150)
VLDLC SERPL CALC-MCNC: 24 MG/DL

## 2020-09-08 PROCEDURE — 80053 COMPREHEN METABOLIC PANEL: CPT

## 2020-09-08 PROCEDURE — 36415 COLL VENOUS BLD VENIPUNCTURE: CPT

## 2020-09-08 PROCEDURE — 80061 LIPID PANEL: CPT

## 2020-09-29 ENCOUNTER — OFFICE VISIT (OUTPATIENT)
Dept: INTERNAL MEDICINE CLINIC | Age: 75
End: 2020-09-29
Payer: MEDICARE

## 2020-09-29 VITALS
WEIGHT: 152 LBS | HEART RATE: 84 BPM | DIASTOLIC BLOOD PRESSURE: 78 MMHG | SYSTOLIC BLOOD PRESSURE: 118 MMHG | HEIGHT: 64 IN | OXYGEN SATURATION: 95 % | BODY MASS INDEX: 25.95 KG/M2 | TEMPERATURE: 96.6 F

## 2020-09-29 PROCEDURE — G8417 CALC BMI ABV UP PARAM F/U: HCPCS | Performed by: NURSE PRACTITIONER

## 2020-09-29 PROCEDURE — G8399 PT W/DXA RESULTS DOCUMENT: HCPCS | Performed by: NURSE PRACTITIONER

## 2020-09-29 PROCEDURE — 1036F TOBACCO NON-USER: CPT | Performed by: NURSE PRACTITIONER

## 2020-09-29 PROCEDURE — 99214 OFFICE O/P EST MOD 30 MIN: CPT | Performed by: NURSE PRACTITIONER

## 2020-09-29 PROCEDURE — G8427 DOCREV CUR MEDS BY ELIG CLIN: HCPCS | Performed by: NURSE PRACTITIONER

## 2020-09-29 PROCEDURE — 4040F PNEUMOC VAC/ADMIN/RCVD: CPT | Performed by: NURSE PRACTITIONER

## 2020-09-29 PROCEDURE — 1123F ACP DISCUSS/DSCN MKR DOCD: CPT | Performed by: NURSE PRACTITIONER

## 2020-09-29 PROCEDURE — 1090F PRES/ABSN URINE INCON ASSESS: CPT | Performed by: NURSE PRACTITIONER

## 2020-09-29 ASSESSMENT — ENCOUNTER SYMPTOMS
WHEEZING: 0
ABDOMINAL PAIN: 0
DIARRHEA: 0
NAUSEA: 0
SHORTNESS OF BREATH: 0
VOMITING: 0
COUGH: 0
BLOOD IN STOOL: 0
EYE DISCHARGE: 0
CONSTIPATION: 0

## 2020-09-29 NOTE — PROGRESS NOTES
09/29/20    Vidhya Hartley  66 y.o.  female    Chief Complaint   Patient presents with    Hyperlipidemia         HPI:   Patient presents for review of labs and 6 month evaluation rescheduled from Dr. Natividad Becker:  HLD: Total C 168; HDL 54; LDL 90, Triglycerides 120    CKD: gfr 44--unchanged over the past year    REFLUX: She describes projectile vomiting that has been occurring for a couple of years. She takes prilosec to control heartburn, doesn't eat in the evening to improve sx. Still, sometimes she awakens in the night and must run to the BR to vomit, which is forceful. Other times, even after cereal it will come up in about 2 hours. There is no nausea associated with vomiting. BP: WNL  /78   Pulse 84   Temp 96.6 °F (35.9 °C)   Ht 5' 4\" (1.626 m)   Wt 152 lb (68.9 kg)   SpO2 95%   BMI 26.09 kg/m²        Current Outpatient Medications   Medication Sig Dispense Refill    buPROPion (WELLBUTRIN SR) 150 MG extended release tablet TAKE 1 TABLET BY MOUTH EVERY DAY FOR DEPRESSION 90 tablet 1    traZODone (DESYREL) 50 MG tablet TAKE ONE AND ONE-HALF TABLETS BY MOUTH EVERY NIGHT AT BEDTIME 135 tablet 3    pravastatin (PRAVACHOL) 20 MG tablet TAKE 1 TABLET BY MOUTH EVERY EVENING FOR HIGH CHOLESTEROL 90 tablet 2    FLUoxetine (PROZAC) 20 MG capsule TAKE ONE CAPSULE BY MOUTH EVERY DAY 90 capsule 2    omeprazole (PRILOSEC) 20 MG capsule Take 20 mg by mouth daily      Cetirizine HCl (ZYRTEC PO) Take 1 tablet by mouth daily. No current facility-administered medications for this visit.         Social History     Socioeconomic History    Marital status:      Spouse name: Not on file    Number of children: Not on file    Years of education: Not on file    Highest education level: Not on file   Occupational History    Not on file   Social Needs    Financial resource strain: Not on file    Food insecurity     Worry: Not on file     Inability: Not on file    Transportation needs     Medical: dysuria and hematuria. Musculoskeletal: Negative for myalgias. Skin: Negative for rash. Neurological: Negative for dizziness. Psychiatric/Behavioral: The patient is not nervous/anxious. Physical Exam  Constitutional:       General: She is not in acute distress. Appearance: She is not diaphoretic. HENT:      Right Ear: External ear normal.      Left Ear: External ear normal.      Mouth/Throat:      Pharynx: No oropharyngeal exudate. Eyes:      General: No scleral icterus. Right eye: No discharge. Left eye: No discharge. Neck:      Musculoskeletal: Normal range of motion. Thyroid: No thyromegaly. Cardiovascular:      Rate and Rhythm: Normal rate and regular rhythm. Heart sounds: Normal heart sounds. No murmur. No friction rub. No gallop. Pulmonary:      Effort: Pulmonary effort is normal.      Breath sounds: Normal breath sounds. No wheezing. Abdominal:      General: Bowel sounds are normal. There is no distension. Palpations: Abdomen is soft. Tenderness: There is no abdominal tenderness. Musculoskeletal: Normal range of motion. General: No tenderness. Lymphadenopathy:      Cervical: No cervical adenopathy. Skin:     General: Skin is warm and dry. Findings: No erythema or rash. Neurological:      Mental Status: She is alert and oriented to person, place, and time. Psychiatric:         Judgment: Judgment normal.     1. Vomiting without nausea, intractability of vomiting not specified, unspecified vomiting type  Refer to GI for consult and EGD  Discussed the possibility of pyloric outlet obstruction  - Chelsy Peguero MD, Gastroenterology, [facility]    2. Hyperlipidemia, unspecified hyperlipidemia type  Reviewed lab results with patient     3.  Stage 3 chronic kidney disease  Reviewed lab results with patient   Discussed the need to hydrate well with water     DANA Pierre - CNP

## 2020-10-02 ENCOUNTER — INITIAL CONSULT (OUTPATIENT)
Dept: GASTROENTEROLOGY | Age: 75
End: 2020-10-02
Payer: MEDICARE

## 2020-10-02 VITALS
HEART RATE: 106 BPM | TEMPERATURE: 98.4 F | HEIGHT: 64 IN | DIASTOLIC BLOOD PRESSURE: 71 MMHG | WEIGHT: 151.6 LBS | BODY MASS INDEX: 25.88 KG/M2 | SYSTOLIC BLOOD PRESSURE: 122 MMHG

## 2020-10-02 PROCEDURE — 4040F PNEUMOC VAC/ADMIN/RCVD: CPT | Performed by: INTERNAL MEDICINE

## 2020-10-02 PROCEDURE — 99204 OFFICE O/P NEW MOD 45 MIN: CPT | Performed by: INTERNAL MEDICINE

## 2020-10-02 PROCEDURE — G8399 PT W/DXA RESULTS DOCUMENT: HCPCS | Performed by: INTERNAL MEDICINE

## 2020-10-02 PROCEDURE — G8484 FLU IMMUNIZE NO ADMIN: HCPCS | Performed by: INTERNAL MEDICINE

## 2020-10-02 PROCEDURE — 1036F TOBACCO NON-USER: CPT | Performed by: INTERNAL MEDICINE

## 2020-10-02 PROCEDURE — 1123F ACP DISCUSS/DSCN MKR DOCD: CPT | Performed by: INTERNAL MEDICINE

## 2020-10-02 PROCEDURE — G8417 CALC BMI ABV UP PARAM F/U: HCPCS | Performed by: INTERNAL MEDICINE

## 2020-10-02 PROCEDURE — G8427 DOCREV CUR MEDS BY ELIG CLIN: HCPCS | Performed by: INTERNAL MEDICINE

## 2020-10-02 PROCEDURE — 1090F PRES/ABSN URINE INCON ASSESS: CPT | Performed by: INTERNAL MEDICINE

## 2020-10-02 NOTE — PATIENT INSTRUCTIONS
[address] ,  [address] [facility]  Phone: [phone] [address] [id number], [address]  Phone: [phone]   [facility]:[phone]    COLON POLYPS OVERVIEW - The presence of polyps in the colon or rectum often raises questions for patients and their family. What is the significance of finding a polyp? Does this mean that I have, or will develop, colon or rectal (colorectal) cancer? Will a polyp require surgery? Some types of polyps (called adenomas) have the potential to become cancerous while others (hyperplastic or inflammatory polyps) have virtually no chance of becoming cancerous. When discussing colon polyps, the following points should be considered:        Polyps are common (they occur in 30 to 48 percent of adults)      Not all polyps will become cancer      It takes many years for a polyp become cancerous      Polyps can be completely and safely removed    The best course of action when a polyp is found depends upon the number, type, size, and location of the polyp. People who have an adenoma removed will require a follow up examination; new polyps may develop over time that need to be removed. COLON POLYP CAUSES - Polyps are very common in men and women of all races who live in industrialized countries, suggesting that dietary and environmental factors play a role in their development. Lifestyle - Although the exact causes are not completely understood, lifestyle risk factors include the following:        A high fat diet      A diet high in red meat      A low fiber diet      Cigarette smoking      Obesity    On the other hand, use of aspirin and other NSAIDs and a high calcium diet may protect against the development of colon cancer. Aging - Colorectal cancer is uncommon before age 36.  [de-identified] percent of cases occur after age 48, with men and women being similarly affected; therefore, colon cancer screening is usually recommended starting at age 48 for both sexes. It takes approximately 10 years for a small polyp to develop into cancer. Family history and genetics - Polyps and colon cancer tend to run in families, suggesting that genetic factors are also important in their development. Any history of colon polyps or colon cancer in the family should be discussed with a healthcare provider, particularly if cancer developed at an early age, in close relatives, or in multiple family members. As a general rule, screening for colon cancer begins at an earlier age in people with a family history of cancer or polyps. Rare genetic diseases can cause high rates of colorectal cancer relatively early in adult life. One such disease, called familial adenomatous polyposis (FAP), causes multiple colon polyps. Another, Hereditary Non-Polyposis Colon Cancer (HNPCC), increases the risk of colon cancer, often beginning in the 20s and 30s, but does not cause a large number of polyps. Testing for these genes may be recommended for families with high rates of colorectal cancer, but is not generally recommended for other groups. TYPES OF COLON POLYPS - The most common types of polyps are hyperplastic and adenomatous polyps. Other types of polyps can also be found in the colon, although these are far less common and are not discussed here. Hyperplastic polyps - Hyperplastic polyps are usually small, located in the end-portion of the colon (the rectum and sigmoid colon), have no potential to become malignant, and are not worrisome. It is not always possible to distinguish a hyperplastic polyp from an adenomatous polyp based upon appearance during colonoscopy, which means that hyperplastic polyps are often removed or biopsied to allow microscopic examination. Adenomatous polyps - Two-thirds of colon polyps are adenomas. Most of these polyps do not develop into cancer, although they have the potential to become cancerous. Adenomas are classified by their size, general appearance, and their specific features as seen under the microscope. As a general rule, the larger the adenoma, the more likely it is to eventually become a cancer. As a result, large polyps are usually removed completely to allow for microscopic examination. Malignant polyps - Polyps that contain pre-cancerous or cancerous cells are known as malignant polyps. The optimal treatment for malignant polyps depends upon the extent of the cancer (when examined with a microscope) and other individual factors. COLON POLYP DIAGNOSIS - Polyps usually do not cause symptoms but may be detected during a colon cancer screening examination (such as flexible sigmoidoscopy or colonoscopy) (picture 1) or after a positive fecal occult blood test. Polyps can also be detected on a barium enema x-ray, although small polyps are more difficult to see with x-ray. Colonoscopy is the best way to evaluate the colon because it allows the physician to see the entire lining of the colon and remove any polyps that are found. During colonoscopy, a physician inserts a very thin flexible tube with a light source and small camera into the anus. The tube is advanced through the entire length of the large intestine (colon). The inside of the colon is a tube-like structure with a flat surface with curved folds. A polyp appears as a lump that protrudes into the inside of the colon. The tissue covering a polyp may look the same as normal colon tissue, or, there may be tissue changes ranging from subtle color changes to ulceration and bleeding. Some polyps are flat (\"sessile\") and others extend out on a stalk (\"pedunculated\"). Colonoscopy is also the best test for the follow-up examination of polyps. Virtual colonoscopy using CT technology is another test used to detect polyps in specific circumstances.     COLON POLYP REMOVAL - Colorectal cancer is the second leading cause of cancer deaths in the [place], accounting for 14 percent of cancer deaths. Colorectal cancer is preventable if precancerous polyps (ie, adenomas) are detected and removed before they become malignant (cancerous). Over time, small polyps can change their structure and become cancerous. Polyps are usually removed when they are found on colonoscopy, which eliminates the chance for that polyp to become cancerous. Procedure - The medical term for removing polyps is polypectomy. Most polypectomies can be performed through a colonoscope. Small polyps can be removed with an instrument that is inserted through the colonoscope and snips off small pieces of tissue. Larger polyps are usually removed by placing a noose, or snare, around the polyp base and burning through it with electric cautery. The cautery also helps to stop bleeding after the polyp is removed. Polyp removal is not painful because the lining of the colon does not have the ability to feel pain. In addition, a sedative medication is given before the colonoscopy to prevent pain caused by stretching of the colon. Rarely, a polyp will be too large to remove during colonoscopy, which means that a surgical procedure will be needed at a later time. Complications - Polypectomy is safe although it has a few potential risks and complications. The most common complications are bleeding and perforation (creating a hole in the colon). Fortunately, this occurs infrequently (one in 1000 patients having colonoscopy). Bleeding can usually be controlled during colonoscopy by cauterizing (applying heat) to the bleeding site; surgery is sometimes required for perforation. After polyp removal - Medications that can increase bleeding, including aspirin, ibuprofen (Advil®, Motrin®), and naproxen (Era Cast), should be avoided for approximately two weeks after polypectomy. Acetaminophen (Tylenol®) is safe to take.  People who require anticoagulant medications such as warfarin (Coumadin®) should discuss how and when to resume this medication with their clinician. A follow up appointment or phone call is usually scheduled after the polyp removal to discuss the results of the tissue analysis and the need for a repeat examination. COLON POLYP PREVENTION    Follow up examination - People with adenomatous polyps have an increased risk of developing more polyps, which are likely to be adenomatous. There is a 25 to 30 percent chance that adenomas will be present on a repeat colonoscopy done three years after the initial polypectomy. Some of these polyps may have been present during the original examination, but were too small to detect. Other new polyps may also have developed. After polyps are removed, repeat colonoscopy is recommended, usually three to five years after the initial colonoscopy. However, this time interval depends upon several factors:        Microscopic characteristics of the polyp      Number and size of the polyps      The appearance of the colon during the colonoscopy. A bowel preparation is needed before colonoscopy to remove all traces of feces (stool). If the bowel prep was not completed, feces may remain in the colon, making it more difficult to see small to moderate size polyps. In this situation, follow up colonoscopy may be recommended sooner than three to five years later. Persons who undergo screening (and re-screening) for colon cancer are much less likely to die from colon cancer. Thus, following screening guidelines is one of the most important measures. Preventing colon cancer - Intensive research is underway to develop ways to prevent polyps and colon cancer with diet or medications. A number of nutrients and medications have been identified that may reduce the risk of colon cancer.  Guidelines issued by one of the major medical societies in the [facility] (the [employer] of Gastroenter[employer]) suggest the following to prevent polyps from recurring:        Eat a diet that is low in fat and high in fruits, vegetables, and fiber      Maintain a normal body weight      Avoid smoking and excessive alcohol use    IMPLICATIONS FOR THE FAMILY - First-degree relatives (a parent, brother, sister, or child) of a person who has been diagnosed with an adenomatous polyp (or colorectal cancer) before the age of 61 years have an increased risk of developing adenomatous polyps and colorectal cancer compared to the general population. Thus, family should be made aware if the person is diagnosed with an adenoma or colon cancer. While screening for polyps and cancer is recommended for everyone (typically beginning at age 48), those at increased risk should begin screening earlier, typically at age 36. The best test for screening in people with an increased risk of cancer is not known, although a sensitive test (such as colonoscopy) is usually recommended. Relatives can be told the following:        People who have one first-degree relative (parent, brother, sister, or child) with colorectal cancer or an adenomatous polyps at a young age (before the age of 61 years), or two first-degree relatives diagnosed at any age, should begin screening for colon cancer earlier, typically at age 36, or 8 years younger than the earliest diagnosis in their family, whichever comes first. Screening usually includes colonoscopy, which should be repeated every five years. People who have one first-degree relative (parent, brother, sister, or child) with colorectal cancer or an adenomatous polyp at age 61 or later should begin screening at age 36. If the examination shows no polyps, it should be repeated similar to a person with an average risk of colon cancer.        People with a second-degree relative (grandparent, aunt, or uncle) or third-degree relative (great-grandparent or cousin) with colorectal cancer should be screened for colon cancer similar to a person with an average risk. Some conditions, such as hereditary nonpolyposis colorectal cancer, familial adenomatous polyposis, and inflammatory bowel disease (eg, ulcerative colitis, Crohn's disease) significantly increase the risk of colon polyps or cancer in family members. Colon cancer screening in this group is discussed separately. WHERE TO GET MORE INFORMATION - Your healthcare provider is the best source of information for questions and concerns related to your medical problem. This article will be updated as needed every four months on our web site ([email or web address].Scytl/patients). The following organizations also provide reliable health information. [employer]          (www.nlm.nih.gov/medlineplus/healthtopics. html)        The American Gastroenterological Association          (www.gastro. org)        The [employer]          (www.acg.gi.org)        The American Society of Colon and Rectal Surgeon          (www.fascrs. Salomon Zamora    [address] ,  [address] [facility]  Phone: [phone]  [address] [place] [address] [address], [address] [facility], [address] [place] [address]  Phone: [phone]    Sedation  Three types of sedation are used for endoscopy and colonoscopy. The standard and most common is called conscious sedation. This is administered by the gastroenterologist and is part of the standard procedure. Common medications used for this are IV forms of a benzodiazepine (most commonly Versed) and a narcotic (most commonly fentanyl). Benadryl and nausea medicines may also be used. The effect of this is to make you comfortable. Most people will actually have amnesia with this and not recall the procedure.   Some individuals will have other types of anesthesia provided by an anesthesiologist or nurse anesthetist.  The reason for this is a history of poor sedation, medication use that makes one more resistant to conscious sedation, a medical condition for which conscious sedation is contraindicated or other medical unstable conditions. This usually involves a separate fee from anesthesia. The most common of these is propofol (diprivan sedation) which is a deeper sedative the conscious sedation. In some instances, general anesthesia with intubation (breathing tube) is required. ENDOSCOPY OVERVIEW  An upper endoscopy, often referred to as endoscopy, EGD, or ydiyneeq-thoxzc-ikuxbeoxtitt, is a procedure that allows a physician to directly examine the upper part of the gastrointestinal (GI) tract, which includes the esophagus (swallowing tube), the stomach, and the duodenum (the first section of the small intestine)  The physician who performs the procedures, known as an endoscopist, has special training in using an endoscope to examine the upper GI system, looking for inflammation (redness, irritation), bleeding, ulcers, or tumors. REASONS FOR UPPER ENDOSCOPY  The most common reasons for upper endoscopy include:  Unexplained discomfort in the upper abdomen   GERD or gastroesophageal reflux disease, (often called heartburn)   Persistent nausea and vomiting   Upper GI bleeding (vomiting blood or blood found in the stool that originated from the upper part of the gastrointestinal tract). Bleeding can be treated during the endoscopy. Difficulty swallowing; food/liquids getting stuck in the esophagus during swallowing. This may be caused by a narrowing (stricture) or tumor. The stricture may be dilated with special balloons or dilation tubes during the endoscopy. Abnormal or unclear findings on an upper GI x-ray, CT scan or MRI. Removal of a foreign body (a swallowed object). To check healing or progress on previously found polyps (growths), tumors, or ulcers. ENDOSCOPY PREPARATION  You will be given specific instructions regarding how to prepare for the examination before the procedure.  These instructions are designed to maximize your safety during and after the examination and to minimize possible complications. It is important to read the instructions ahead of time and follow them carefully. Do not hesitate to call the physician's office or the endoscopy unit if there are questions. Nothing to eat after midnight the day before the test. You may be asked not to eat or drink anything for up to eight hours before the test. It is important for your stomach to be empty to allow the endoscopist to visualize the entire area and to decrease the possibility of food or fluid being vomited into the lungs while under sedation (called aspiration). You may be asked to adjust the dose of your medications or to stop specific medications (such as aspirin-like drugs) temporarily before the examination. You should discuss your medications with your physician before your appointment for the endoscopy. You should arrange for a friend or family member to escort you home after the examination. Although you will be awake by the time you are discharged, the medications used for sedation cause temporary changes in the reflexes and judgment and interfere with your ability to drive or make decisions (similar to the effects of alcohol). WHAT TO EXPECT DURING ENDOSCOPY  Prior to the endoscopy, the staff will review your medical and surgical history, including current medications. A physician will explain the procedure and ask you to sign a consent. Before signing the consent, you should understand all the benefits and risks of the procedure, and should have all of your questions answered. An intravenous line (a needle inserted into a vein in the hand or arm) will be started to deliver medications. You will be given a combination of a sedative (to help you relax), and a narcotic (to prevent discomfort). Although most patients are sedated for the examination, many tolerate the procedure well without any medication.   Your vital signs (blood discomfort as air is pushed into the intestinal tract. This is not harmful and belching may relieve the sensation. The endoscope does not interfere with breathing. Taking slow, deep breaths during the procedure may help you to relax. ENDOSCOPY RECOVERY  After the endoscopy, you will be observed for one to two hours while the sedative medication wears off. The medicines cause most people to temporarily feel tired or have difficulty concentrating and you should not drive or return to work after the procedure. The most common discomfort after the examination is a feeling of bloating as a result of the air introduced during the examination. This usually resolves quickly. Some patients also have a mild sore throat. Most patients are able to eat shortly after the examination. ENDOSCOPY COMPLICATIONS  Upper endoscopy is a safe procedure and complications are uncommon. The following is a list of possible complications:  Aspiration (inhaling) of food or fluids into the lungs, the risk of which can be minimized by not eating or drinking for the recommended period of time before the examination. The endoscope can cause a tear or hole in the tissue being examined. This is a serious complication but fortunately occurs only rarely. Bleeding can occur from biopsies or the removal of polyps, although it is usually minimal and stops quickly on its own or can be easily controlled. Reactions to the sedative medications are possible; the endoscopy team (doctors and nurses) will ask about previous medication allergies or reactions and about health problems such as heart, lung, kidney, or liver disease. Providing this information to the team ensures a safer examination. The medications may produce irritation in the vein at the site of the intravenous line. If redness, swelling, or discomfort occurs, your should call your endoscopist or primary care provider, or the number given by the nurse at discharge.   The following signs and symptoms should be reported immediately:  Severe abdominal pain (more than gas cramps)   A firm, distended abdomen   Vomiting   Any temperature elevation   Difficulty swallowing or severe throat pain   A crunching feeling under the skin of the neck    AFTER UPPER ENDOSCOPY  Most patients tolerate endoscopy very well and feel fine afterwards. Some fatigue is common after the examination, and you should plan to take it easy and relax the rest of the day. The endoscopist can describe the result of their examination before you leave the endoscopy unit. If biopsies have been taken or polyps removed, you should call for results within one to two weeks. WHERE TO GET MORE INFORMATION  Your healthcare provider is the best source of information for questions and concerns related to your medical problem. The following organizations also provide reliable health information. [employer] (www.nlm.nih.gov/medlineplus/healthtopics. html)  The American Society of Gastrointestinal Endoscopy: (www.askasge. org)  [employer] of Diabetes and Digestive and Kidney Diseases (http://digestive. niddk.nih.gov/ddiseases/pubs/upperendoscopy/index. htm)

## 2020-10-02 NOTE — LETTER
[address] ,  [address] [facility], [facility]  Phone: [phone]   INZ:[phone]  [address] [facility],  [address] [facility], [address] [place] [address]  Phone: [phone]   [facility]:[phone]    10/02/20    Patient:Meryl Moran  MR Number:<[id number]>  YOB: 1945  Date of Visit:10/2/20    Dear Dr. Ana Newell MD    Thank you for the request for consultation for Eliz Yang to me for the evaluation of   Chief Complaint   Patient presents with    New Patient     vomiting    . Below are the relevant portions of my assessment and plan of care. FINAL DIAGNOSIS/Assessment   Diagnosis Orders   1. Preop testing  Covid-19 Ambulatory   2. Nausea and vomiting, intractability of vomiting not specified, unspecified vomiting type  EGD   3. Heartburn  EGD       VISIT ORDERS/Plan  Orders Placed This Encounter   Procedures    Covid-19 Ambulatory     Standing Status:   Future     Standing Expiration Date:   10/2/2021     Scheduling Instructions:      Saline media preferred given current shortage of viral transport media but both acceptable     Order Specific Question:   Status     Answer:   Asymptomatic/Surveillance (e.g. pre-op/pre-procedure, pre-delivery, transfer)     Order Specific Question:   Reason for Test     Answer:   Upcoming elective surgery/procedure/delivery, return to work, or discharge to another facility    EGD     Scheduling Instructions:      Please provide prep of choice instructions and prescription. General guidelines for holding blood thinners/anticoagulants around endoscopic procedure are but patients are encouraged to check with their prescribing physician. The patient may hold Plavix, Effient, Brilinta 5 days prior to the procedure unless:       A drug eluting stent has been placed within past 12 months. A nondrug eluting stent has been placed within past 1 month.       Coumadin may be held 4 days prior to the procedure unless: Mechanical mitral valve replacement (requires heparin bridge while Coumadin held and is managed by pharmacy)      Pradaxa, Xarelto, Eliquis may be held 2-3 days prior to procedure. According to pharmacokinetics of the drug, package insert, cardiology practice patterns, and T1/2 of theses drugs (12 hrs), Eliquis and Xarelto are held 48hrs prior to any procedure, including major surgical procedures w/o       increased bleeding.  That is usually the standard of care, as coagulation would/should be normalized at 48hrs. Every attempt should be made to maintain ASA 81mg per day throughout the zach-operative period in patients with diagnosis of ASHD. These recommendations may need to be modified by the provider/ based on risk /benefit analysis of the procedure and the patients history. If anticoagulation can not be held because recent cardiac stent, elective endoscopic procedures should be delayed until they have received the minimum duration of recommended antiplatlet therapy and it can safely be held. Again if unsure, patient should discuss with prescribing physician/service. If anticoagulation can not be stopped, endoscopic procedures can still be performed either diagnostically at a somewhat higher risk. Understand that any therapeutic procedure where anything beyond looking is performed, carries higher risks. For this reason without overt bleeding other testing       such as cologuard may be more appropriate. High risk endoscopic procedures that require stopping antiplatelet and anticoagulation therapy include polypectomy, biliary or pancreatic sphincterotomy, pneumatic or bougie dilation, PEG placement, therapeutic balloon-assisted enteroscopy, EUS and FNA, tumor ablation by any technique,       cystogastrostomy,and treatment of varices. Order Specific Question:   Screening or Diagnostic?      Answer:   Diagnostic If you have questions, please do not hesitate to call me. I look forward to following Franci Martinez along with you.     Sincerely,        Mahsa Blanc 10/2/20 2:13 PM EDT

## 2020-10-02 NOTE — PROGRESS NOTES
[address] [facility],  [address] [place] [address] Ave  [place], [address] [place] [address]  Phone: [phone]   [facility]     Chief Complaint   Patient presents with    New Patient     vomiting        HPI (location/symptom, timing/onset, duration, quality/severity, context, modifying factors, and associated signs/symptoms)     Thank you Bianca Benavides MD for asking me to see Dwane Litten in consultation. She is a  [2] White [1] 66 y.o. Virginie Dye female seen independently who presents with the following GI complaints:    Dwane Litten  Complains of frequent am vomiting for years that has become worse the past year. There is associated breakthrough heartburn on a chronic ppi daily. Pepcid was recently added at night without improvement. Symptoms are moderate . No dysphagia, cough, chest pain, globus, regurgitation. No asa or nsaid use. Follows a gerd diet. No additional modifying/relieving factors. Ct chest abd 5yo showed a small hiatal hernia. Last Encounter Reviewed:   Pertinent PMH, FH, SH is reviewed below. Last EGD: none  Last Colonoscopy: 3/2003    Review of available records reveals:   Wt Readings from Last 50 Encounters:   10/02/20 151 lb 9.6 oz (68.8 kg)   09/29/20 152 lb (68.9 kg)   02/27/20 153 lb (69.4 kg)   08/27/19 153 lb 12.8 oz (69.8 kg)   03/14/19 147 lb 0.3 oz (66.7 kg)   02/21/19 147 lb (66.7 kg)   08/23/18 149 lb (67.6 kg)   04/24/18 153 lb (69.4 kg)   02/13/18 158 lb (71.7 kg)   12/11/17 156 lb 1.4 oz (70.8 kg)   10/30/17 156 lb 1.4 oz (70.8 kg)   10/19/17 156 lb (70.8 kg)   10/10/17 156 lb (70.8 kg)   10/03/17 154 lb 1.6 oz (69.9 kg)   07/25/17 154 lb (69.9 kg)   01/23/17 152 lb (68.9 kg)   01/16/17 164 lb 3.9 oz (74.5 kg)   12/19/16 164 lb 3.9 oz (74.5 kg)   12/01/16 164 lb 3.9 oz (74.5 kg)   05/17/16 159 lb (72.1 kg)   11/16/15 153 lb (69.4 kg)   07/02/15 156 lb (70.8 kg)   06/18/15 156 lb 1.4 oz (70.8 kg)   05/15/15 156 lb (70.8 kg)   11/06/14 149 lb (67.6 kg)  Highest education level: Not on file   Occupational History    Not on file   Social Needs    Financial resource strain: Not on file    Food insecurity     Worry: Not on file     Inability: Not on file    Transportation needs     Medical: Not on file     Non-medical: Not on file   Tobacco Use    Smoking status: Never Smoker    Smokeless tobacco: Never Used   Substance and Sexual Activity    Alcohol use: No    Drug use: No    Sexual activity: Not on file   Lifestyle    Physical activity     Days per week: Not on file     Minutes per session: Not on file    Stress: Not on file   Relationships    Social connections     Talks on phone: Not on file     Gets together: Not on file     Attends Sikh service: Not on file     Active member of club or organization: Not on file     Attends meetings of clubs or organizations: Not on file     Relationship status: Not on file    Intimate partner violence     Fear of current or ex partner: Not on file     Emotionally abused: Not on file     Physically abused: Not on file     Forced sexual activity: Not on file   Other Topics Concern    Not on file   Social History Narrative    Not on file     SURGICAL HISTORY     Past Surgical History:   Procedure Laterality Date    BRONCHOSCOPY  9/11/14    Right Middle Lobe Volume Loss:Negative Pathology    CATARACT REMOVAL      bilateral    CHOLECYSTECTOMY  07/29/14    DaVinci assisted laparoscopic cholecystetomy    COLONOSCOPY  03/2003    negative    DILATION AND CURETTAGE OF UTERUS      x 2    HAND SURGERY Right 10/19/2017    dupuytren's disease excision including middle and ring finger    KNEE ARTHROSCOPY      MOHS SURGERY      squamous cell    SKIN BIOPSY  11/19/2012    squamous cell nose    TONSILLECTOMY AND ADENOIDECTOMY       CURRENT MEDICATIONS   (This list may include medications prescribed during this encounter as epic can not insert only the list prior to this encounter.)  Current Outpatient Rx Medication Sig Dispense Refill    buPROPion (WELLBUTRIN SR) 150 MG extended release tablet TAKE 1 TABLET BY MOUTH EVERY DAY FOR DEPRESSION 90 tablet 1    traZODone (DESYREL) 50 MG tablet TAKE ONE AND ONE-HALF TABLETS BY MOUTH EVERY NIGHT AT BEDTIME 135 tablet 3    pravastatin (PRAVACHOL) 20 MG tablet TAKE 1 TABLET BY MOUTH EVERY EVENING FOR HIGH CHOLESTEROL 90 tablet 2    FLUoxetine (PROZAC) 20 MG capsule TAKE ONE CAPSULE BY MOUTH EVERY DAY 90 capsule 2    omeprazole (PRILOSEC) 20 MG capsule Take 20 mg by mouth daily      Cetirizine HCl (ZYRTEC PO) Take 1 tablet by mouth daily. ALLERGIES     Allergies   Allergen Reactions    Amoxicillin Rash    Entex Pse [Pseudoephedrine-Guaifenesin]      gittery       IMMUNIZATIONS     Immunization History   Administered Date(s) Administered    Influenza 10/18/2012, 10/24/2013    Influenza Virus Vaccine 11/14/2008, 10/03/2009, 10/21/2010    Influenza, High Dose (Fluzone 65 yrs and older) 11/06/2014, 11/16/2015, 10/10/2017    Influenza, Quadv, IM, PF (6 mo and older Fluzone, Flulaval, Fluarix, and 3 yrs and older Afluria) 12/02/2016    Pneumococcal Conjugate 13-valent (Heetrbo74) 11/16/2015    Pneumococcal Polysaccharide (Ruiasijrz29) 12/01/2008    Td, unspecified formulation 01/17/2009     REVIEW OF SYSTEMS (2-9 systems for level 4, 10 or more for level 5)   See HPI for further details and pertinent postiives. Negative for the following:  Constitutional: Negative for weight change. Negative for appetite change and fatigue. HENT: Negative for nosebleeds, sore throat, mouth sores, and voice change. Respiratory: Negative for cough, choking and chest tightness. Cardiovascular: Negative for chest pain   Gastrointestinal:  No abdominal pain, bloating, dysphagia, cough, chest pain, globus, regurgitation, diarrhea, constipation. Positive for heartburn, nausea, vomiting. Musculoskeletal: Negative for arthralgias. Skin: Negative for pallor.    Neurological: Negative for weakness and light-headedness. Hematological: Negative for adenopathy. Does not bruise/bleed easily. Psychiatric/Behavioral: Negative for suicidal ideas. PHYSICAL EXAM (7 for level 4, 8 or more for level 5)   VITAL SIGNS: /71 (Site: Right Wrist, Position: Sitting, Cuff Size: Medium Adult)   Pulse 106   Temp 98.4 °F (36.9 °C) (Temporal)   Ht 5' 4\" (1.626 m)   Wt 151 lb 9.6 oz (68.8 kg)   BMI 26.02 kg/m²   Wt Readings from Last 3 Encounters:   10/02/20 151 lb 9.6 oz (68.8 kg)   09/29/20 152 lb (68.9 kg)   02/27/20 153 lb (69.4 kg)     Constitutional: Well developed, Well nourished, No acute distress, Non-toxic appearance. HENT: Normocephalic, Atraumatic, Bilateral external ears normal, Oropharynx moist, No oral exudates, Nose normal.   Eyes: Conjunctiva normal, No discharge. Neck: Normal range of motion, No tenderness, Supple, No stridor. Lymphatic: No cervical, subclavian, or axillary lymphadenopathy. Cardiovascular: Normal heart rate, Normal rhythm, No murmurs, No rubs, No gallops. Thorax & Lungs: Normal breath sounds, No respiratory distress, No wheezing, No chest tenderness. No gynecomastia. Abdomen/GI: scars consistent with stated surgeries, no hernias, no HSM, soft NTND   Rectal:  Deferred. Skin: Warm, Dry, No erythema, No rash. No bruising. No spider hemangiomas. Back: No tenderness, No CVA tenderness. Lower Extremities: Intact distal pulses, No edema, No tenderness, No cyanosis, No clubbing. Neurologic: Alert & oriented x 3, Normal motor function, Normal sensory function, No focal deficits noted. No asterixis.   RADIOLOGY/PROCEDURES       FINAL IMPRESSION     Orders Placed This Encounter   Procedures    Covid-19 Ambulatory     Standing Status:   Future     Standing Expiration Date:   10/2/2021     Scheduling Instructions:      Saline media preferred given current shortage of viral transport media but both acceptable     Order Specific Question:   Status     Answer: Asymptomatic/Surveillance (e.g. pre-op/pre-procedure, pre-delivery, transfer)     Order Specific Question:   Reason for Test     Answer:   Upcoming elective surgery/procedure/delivery, return to work, or discharge to another facility    EGD     Scheduling Instructions:      Please provide prep of choice instructions and prescription. General guidelines for holding blood thinners/anticoagulants around endoscopic procedure are but patients are encouraged to check with their prescribing physician. The patient may hold Plavix, Effient, Brilinta 5 days prior to the procedure unless:       A drug eluting stent has been placed within past 12 months. A nondrug eluting stent has been placed within past 1 month. Coumadin may be held 4 days prior to the procedure unless:        Mechanical mitral valve replacement (requires heparin bridge while Coumadin held and is managed by pharmacy)      Pradaxa, Xarelto, Eliquis may be held 2-3 days prior to procedure. According to pharmacokinetics of the drug, package insert, cardiology practice patterns, and T1/2 of theses drugs (12 hrs), Eliquis and Xarelto are held 48hrs prior to any procedure, including major surgical procedures w/o       increased bleeding.  That is usually the standard of care, as coagulation would/should be normalized at 48hrs. Every attempt should be made to maintain ASA 81mg per day throughout the zach-operative period in patients with diagnosis of ASHD. These recommendations may need to be modified by the provider/ based on risk /benefit analysis of the procedure and the patients history. If anticoagulation can not be held because recent cardiac stent, elective endoscopic procedures should be delayed until they have received the minimum duration of recommended antiplatlet therapy and it can safely be held. Again if unsure, patient should discuss with prescribing physician/service.             If anticoagulation can not be stopped, endoscopic procedures can still be performed either diagnostically at a somewhat higher risk. Understand that any therapeutic procedure where anything beyond looking is performed, carries higher risks. For this reason without overt bleeding other testing       such as cologuard may be more appropriate. High risk endoscopic procedures that require stopping antiplatelet and anticoagulation therapy include polypectomy, biliary or pancreatic sphincterotomy, pneumatic or bougie dilation, PEG placement, therapeutic balloon-assisted enteroscopy, EUS and FNA, tumor ablation by any technique,       cystogastrostomy,and treatment of varices. Order Specific Question:   Screening or Diagnostic? Answer:   Esther Hernandez was seen today for new patient. Diagnoses and all orders for this visit:    Preop testing  -     Covid-19 Ambulatory; Future    Nausea and vomiting, intractability of vomiting not specified, unspecified vomiting type  -     EGD    Heartburn  -     EGD    Discussed recommendation for screening colonoscopy since it has been long since she has had any crc screening. She decline at this time but will call to add on if she changes her mind. ORDERED FUTURE/PENDING TESTS     Lab Frequency Next Occurrence   Covid-19 Ambulatory Once 10/02/2020       FOLLOWUP   Return for EGD.           Juan Soto 10/4/20 3:58 AM EDT    CC:  Marlene Hernandez MD

## 2020-10-21 ENCOUNTER — HOSPITAL ENCOUNTER (OUTPATIENT)
Age: 75
Discharge: HOME | End: 2020-10-21
Payer: MEDICARE

## 2020-10-21 PROCEDURE — U0002 COVID-19 LAB TEST NON-CDC: HCPCS

## 2020-10-21 PROCEDURE — U0003 INFECTIOUS AGENT DETECTION BY NUCLEIC ACID (DNA OR RNA); SEVERE ACUTE RESPIRATORY SYNDROME CORONAVIRUS 2 (SARS-COV-2) (CORONAVIRUS DISEASE [COVID-19]), AMPLIFIED PROBE TECHNIQUE, MAKING USE OF HIGH THROUGHPUT TECHNOLOGIES AS DESCRIBED BY CMS-2020-01-R: HCPCS

## 2020-10-21 RX ORDER — FLUOXETINE HYDROCHLORIDE 20 MG/1
CAPSULE ORAL
Qty: 90 CAPSULE | Refills: 1 | Status: SHIPPED | OUTPATIENT
Start: 2020-10-21 | End: 2021-06-01

## 2020-10-21 RX ORDER — PRAVASTATIN SODIUM 20 MG
TABLET ORAL
Qty: 90 TABLET | Refills: 1 | Status: SHIPPED | OUTPATIENT
Start: 2020-10-21 | End: 2021-04-29

## 2020-10-21 RX ORDER — BUPROPION HYDROCHLORIDE 150 MG/1
TABLET, EXTENDED RELEASE ORAL
Qty: 90 TABLET | Refills: 1 | Status: SHIPPED | OUTPATIENT
Start: 2020-10-21 | End: 2021-05-10

## 2020-10-21 NOTE — TELEPHONE ENCOUNTER
Refill request for prozac, wellbutrin, pravastatin medication.      Name of [facility]      Last visit - 9/29/20     Pending visit - 1/19/21    Last refill -1/10/20

## 2020-10-22 LAB — SARS-COV-2, PCR: NOT DETECTED

## 2020-10-27 ENCOUNTER — HOSPITAL ENCOUNTER (OUTPATIENT)
Age: 75
Setting detail: OUTPATIENT SURGERY
Discharge: HOME | End: 2020-10-27
Attending: INTERNAL MEDICINE | Admitting: INTERNAL MEDICINE
Payer: MEDICARE

## 2020-10-27 VITALS
OXYGEN SATURATION: 94 % | SYSTOLIC BLOOD PRESSURE: 129 MMHG | HEART RATE: 71 BPM | BODY MASS INDEX: 25.61 KG/M2 | WEIGHT: 150 LBS | HEIGHT: 64 IN | RESPIRATION RATE: 18 BRPM | DIASTOLIC BLOOD PRESSURE: 70 MMHG | TEMPERATURE: 99.2 F

## 2020-10-27 PROCEDURE — 6360000002 HC RX W HCPCS: Performed by: INTERNAL MEDICINE

## 2020-10-27 PROCEDURE — 7100000011 HC PHASE II RECOVERY - ADDTL 15 MIN: Performed by: INTERNAL MEDICINE

## 2020-10-27 PROCEDURE — 43235 EGD DIAGNOSTIC BRUSH WASH: CPT | Performed by: INTERNAL MEDICINE

## 2020-10-27 PROCEDURE — 99152 MOD SED SAME PHYS/QHP 5/>YRS: CPT | Performed by: INTERNAL MEDICINE

## 2020-10-27 PROCEDURE — 7100000010 HC PHASE II RECOVERY - FIRST 15 MIN: Performed by: INTERNAL MEDICINE

## 2020-10-27 PROCEDURE — 2580000003 HC RX 258: Performed by: INTERNAL MEDICINE

## 2020-10-27 PROCEDURE — 3609017100 HC EGD: Performed by: INTERNAL MEDICINE

## 2020-10-27 PROCEDURE — 2709999900 HC NON-CHARGEABLE SUPPLY: Performed by: INTERNAL MEDICINE

## 2020-10-27 RX ORDER — SODIUM CHLORIDE, SODIUM LACTATE, POTASSIUM CHLORIDE, CALCIUM CHLORIDE 600; 310; 30; 20 MG/100ML; MG/100ML; MG/100ML; MG/100ML
INJECTION, SOLUTION INTRAVENOUS CONTINUOUS
Status: DISCONTINUED | OUTPATIENT
Start: 2020-10-27 | End: 2020-10-27 | Stop reason: HOSPADM

## 2020-10-27 RX ORDER — MIDAZOLAM HYDROCHLORIDE 5 MG/ML
INJECTION INTRAMUSCULAR; INTRAVENOUS PRN
Status: DISCONTINUED | OUTPATIENT
Start: 2020-10-27 | End: 2020-10-27 | Stop reason: ALTCHOICE

## 2020-10-27 RX ORDER — OMEPRAZOLE 20 MG/1
20 CAPSULE, DELAYED RELEASE ORAL
Qty: 60 CAPSULE | Refills: 1 | Status: SHIPPED | OUTPATIENT
Start: 2020-10-27 | End: 2020-10-29 | Stop reason: SDUPTHER

## 2020-10-27 RX ORDER — FENTANYL CITRATE 50 UG/ML
INJECTION, SOLUTION INTRAMUSCULAR; INTRAVENOUS PRN
Status: DISCONTINUED | OUTPATIENT
Start: 2020-10-27 | End: 2020-10-27 | Stop reason: ALTCHOICE

## 2020-10-27 RX ADMIN — SODIUM CHLORIDE, POTASSIUM CHLORIDE, SODIUM LACTATE AND CALCIUM CHLORIDE: 600; 310; 30; 20 INJECTION, SOLUTION INTRAVENOUS at 10:48

## 2020-10-27 ASSESSMENT — PAIN - FUNCTIONAL ASSESSMENT: PAIN_FUNCTIONAL_ASSESSMENT: 0-10

## 2020-10-27 NOTE — PROGRESS NOTES
[address] ,  [address] [facility]  Phone: [phone] [facility] [address],  [address] [facility], [address]  Phone: [phone]   U:[phone]    EGD Procedure Note    Patient: Edith Witt  : 1945    Procedure: Esophagogastroduodenoscopy    Date:  10/27/2020     Endoscopist:  Krissy Farah MD    Referring Physician:  Farhad Marroquin MD    Preoperative Diagnosis:  nausea, vomiting, heartburn    Anesthesia: Anesthesia: Moderate Sedation  Sedation: Versed 5 mg IV, fentanyl 75 mcg IV  Start Time: 12:10  Stop Time: 12:12  ASA Class: 2  Mallampati: II (soft palate, uvula, fauces visible)    Indications: This is a 66y.o. year old female who presents today with nausea and vomiting. Procedure Details  Informed consent was obtained for the procedure, including conscious sedation. Risks of pancreatitis, infection, perforation, hemorrhage, adverse drug reaction and aspiration were discussed. The patient was placed in the left lateral decubitus position. Based on the pre-procedure assessment, including review of the patient's medical history, medications, allergies, and review of systems, she had been deemed to be an appropriate candidate for conscious sedation; she was therefore sedated with the medications listed above. She was monitored continuously with ECG tracing, pulse oximetry, blood pressure monitoring, and direct observation. A gastroscope was inserted into the mouth and advanced under direct vision to second portion of the duodenum. A careful inspection was made as the gastroscope was withdrawn, including a retroflexed view of the proximal stomach including views of the incisura and cardia; findings and interventions are described below. Appropriate photodocumentation Was Obtained. If photos taken, they were ordered to be scanned into the medical record.     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 Not dilated without complaints of esophageal dysphagia. Specimens: Was not Obtained    Complications:   None; patient tolerated the procedure well. Disposition:   PACU - hemodynamically stable. Estimated Blood loss:  none    Impression:   -See post-procedure diagnoses. Recommendations:  -Acid suppression with a proton pump inhibitor twice a day. -GERD diet: avoid carbonated/acid beverages, fried and fatty foods. peppermint, chocolate, alcohol, coffee, citrus fruits and juices, tomoato products; avoid lying down for 2 to 3 hours after eating, avoid tight fitting clothing.    -Follow up with me in the office in a month.         Carl Harmon 10/27/20 12:17 PM EDT

## 2020-10-27 NOTE — H&P
[address] [facility],4Th Floor,  [address] [facility], [address] [place] [address]  Phone: [phone]     CHIEF COMPLAINT           Chief Complaint   Patient presents with    New Patient       vomiting          HPI (location/symptom, timing/onset, duration, quality/severity, context, modifying factors, and associated signs/symptoms)      Thank you Bj Huitron MD for asking me to see Ladarius Craig in consultation. She is a  [2] White [1] 66 y.o. [place] female seen independently who presents with the following GI complaints:     Ladarius Craig  Complains of frequent am vomiting for years that has become worse the past year. There is associated breakthrough heartburn on a chronic ppi daily. Pepcid was recently added at night without improvement. Symptoms are moderate . No dysphagia, cough, chest pain, globus, regurgitation. No asa or nsaid use. Follows a gerd diet. No additional modifying/relieving factors. Ct chest abd 3yo showed a small hiatal hernia.       Last Encounter Reviewed:   Pertinent PMH, FH, SH is reviewed below. Last EGD: none  Last Colonoscopy: 3/2003     Review of available records reveals:       Wt Readings from Last 50 Encounters:   10/02/20 151 lb 9.6 oz (68.8 kg)   09/29/20 152 lb (68.9 kg)   02/27/20 153 lb (69.4 kg)   08/27/19 153 lb 12.8 oz (69.8 kg)   03/14/19 147 lb 0.3 oz (66.7 kg)   02/21/19 147 lb (66.7 kg)   08/23/18 149 lb (67.6 kg)   04/24/18 153 lb (69.4 kg)   02/13/18 158 lb (71.7 kg)   12/11/17 156 lb 1.4 oz (70.8 kg)   10/30/17 156 lb 1.4 oz (70.8 kg)   10/19/17 156 lb (70.8 kg)   10/10/17 156 lb (70.8 kg)   10/03/17 154 lb 1.6 oz (69.9 kg)   07/25/17 154 lb (69.9 kg)   01/23/17 152 lb (68.9 kg)   01/16/17 164 lb 3.9 oz (74.5 kg)   12/19/16 164 lb 3.9 oz (74.5 kg)   12/01/16 164 lb 3.9 oz (74.5 kg)   05/17/16 159 lb (72.1 kg)   11/16/15 153 lb (69.4 kg)   07/02/15 156 lb (70.8 kg)   06/18/15 156 lb 1.4 oz (70.8 kg)   05/15/15 156 lb (70.8 kg)   11/06/14 149 lb (67.6 kg)   09/18/14 151 lb 9.6 oz (68.8 kg)   09/11/14 150 lb (68 kg)   07/25/14 150 lb (68 kg)   07/25/14 150 lb (68 kg)   06/12/14 149 lb 9.6 oz (67.9 kg)   05/29/14 148 lb (67.1 kg)   05/01/14 148 lb (67.1 kg)   04/15/14 150 lb 3.2 oz (68.1 kg)   02/14/14 147 lb 9.6 oz (67 kg)   11/27/13 145 lb (65.8 kg)   10/24/13 144 lb (65.3 kg)   04/23/13 155 lb (70.3 kg)   01/22/13 161 lb (73 kg)   11/19/12 150 lb (68 kg)   11/14/12 150 lb (68 kg)   10/18/12 154 lb (69.9 kg)         No components found for: HGBA1C      BP Readings from Last 3 Encounters:   10/02/20 122/71   09/29/20 118/78   02/27/20 118/80           Health Maintenance   Topic Date Due    DTaP/Tdap/Td vaccine (1 - Tdap) 09/23/1962    Shingles Vaccine (1 of 2) 09/23/1993    Annual Wellness Visit (AWV)  05/29/2019    Lipid screen  09/08/2021    DEXA (modify frequency per FRAX score)  Completed    Flu vaccine  Completed    Pneumococcal 65+ years Vaccine  Completed    Hepatitis A vaccine  Aged Out    Hepatitis B vaccine  Aged Out    Hib vaccine  Aged Out    Meningococcal (ACWY) vaccine  Aged Out         No components found for: Quench      PAST MEDICAL HISTORY      Past Medical History        Past Medical History:   Diagnosis Date    Anxiety      Arthritis      Cancer ([facility] [address]       skin    Cervical radiculopathy 5/2012     LT. c-5 c-6 MRI    Depression      Dupuytren's contracture      Fractures      Hyperlipidemia      Hyperlipidemia      IBS (irritable bowel syndrome)      Insomnia      Migraine       past hx    Reflux      Shingles 11/2013     Left Face.     SOB (shortness of breath) 03/23/2017     GXT  Normal        FAMILY HISTORY      Family History         Family History   Problem Relation Age of Onset    High Blood Pressure Mother      Heart Disease Mother [de-identified]         CABG    Diabetes Mother      Heart Disease Maternal Grandmother          SOCIAL HISTORY      Social History               Socioeconomic History    Marital status:        Spouse name: Not on file    Number of children: Not on file    Years of education: Not on file    Highest education level: Not on file   Occupational History    Not on file   Social Needs    Financial resource strain: Not on file    Food insecurity       Worry: Not on file       Inability: Not on file    Transportation needs       Medical: Not on file       Non-medical: Not on file   Tobacco Use    Smoking status: Never Smoker    Smokeless tobacco: Never Used   Substance and Sexual Activity    Alcohol use: No    Drug use: No    Sexual activity: Not on file   Lifestyle    Physical activity       Days per week: Not on file       Minutes per session: Not on file    Stress: Not on file   Relationships    Social connections       Talks on phone: Not on file       Gets together: Not on file       Attends Baptism service: Not on file       Active member of club or organization: Not on file       Attends meetings of clubs or organizations: Not on file       Relationship status: Not on file    Intimate partner violence       Fear of current or ex partner: Not on file       Emotionally abused: Not on file       Physically abused: Not on file       Forced sexual activity: Not on file   Other Topics Concern    Not on file   Social History Narrative    Not on file        SURGICAL HISTORY      Past Surgical History   Past Surgical History:   Procedure Laterality Date    BRONCHOSCOPY   9/11/14     Right Middle Lobe Volume Loss:Negative Pathology    CATARACT REMOVAL         bilateral    CHOLECYSTECTOMY   07/29/14     DaVinci assisted laparoscopic cholecystetomy    COLONOSCOPY   03/2003     negative    DILATION AND CURETTAGE OF UTERUS         x 2    HAND SURGERY Right 10/19/2017     dupuytren's disease excision including middle and ring finger    KNEE ARTHROSCOPY        MOHS SURGERY         squamous cell    SKIN BIOPSY   11/19/2012     squamous cell nose    TONSILLECTOMY AND ADENOIDECTOMY            CURRENT MEDICATIONS   (This list may include medications prescribed during this encounter as epic can not insert only the list prior to this encounter.)  Current Outpatient Rx          Current Outpatient Rx   Medication Sig Dispense Refill    buPROPion (WELLBUTRIN SR) 150 MG extended release tablet TAKE 1 TABLET BY MOUTH EVERY DAY FOR DEPRESSION 90 tablet 1    traZODone (DESYREL) 50 MG tablet TAKE ONE AND ONE-HALF TABLETS BY MOUTH EVERY NIGHT AT BEDTIME 135 tablet 3    pravastatin (PRAVACHOL) 20 MG tablet TAKE 1 TABLET BY MOUTH EVERY EVENING FOR HIGH CHOLESTEROL 90 tablet 2    FLUoxetine (PROZAC) 20 MG capsule TAKE ONE CAPSULE BY MOUTH EVERY DAY 90 capsule 2    omeprazole (PRILOSEC) 20 MG capsule Take 20 mg by mouth daily        Cetirizine HCl (ZYRTEC PO) Take 1 tablet by mouth daily.              ALLERGIES            Allergies   Allergen Reactions    Amoxicillin Rash    Entex Pse [Pseudoephedrine-Guaifenesin]         gittery         IMMUNIZATIONS           Immunization History   Administered Date(s) Administered    Influenza 10/18/2012, 10/24/2013    Influenza Virus Vaccine 11/14/2008, 10/03/2009, 10/21/2010    Influenza, High Dose (Fluzone 65 yrs and older) 11/06/2014, 11/16/2015, 10/10/2017    Influenza, Quadv, IM, PF (6 mo and older Fluzone, Flulaval, Fluarix, and 3 yrs and older Afluria) 12/02/2016    Pneumococcal Conjugate 13-valent ([id number]) 11/16/2015    Pneumococcal Polysaccharide ([id number]) 12/01/2008    Td, unspecified formulation 01/17/2009      REVIEW OF SYSTEMS (2-9 systems for level 4, 10 or more for level 5)   See HPI for further details and pertinent postiives. Negative for the following:  Constitutional: Negative for weight change. Negative for appetite change and fatigue. HENT: Negative for nosebleeds, sore throat, mouth sores, and voice change. Respiratory: Negative for cough, choking and chest tightness.    Cardiovascular: Negative for chest pain   Gastrointestinal:  No abdominal pain, bloating, dysphagia, cough, chest pain, globus, regurgitation, diarrhea, constipation. Positive for heartburn, nausea, vomiting. Musculoskeletal: Negative for arthralgias. Skin: Negative for pallor. Neurological: Negative for weakness and light-headedness. Hematological: Negative for adenopathy. Does not bruise/bleed easily. Psychiatric/Behavioral: Negative for suicidal ideas. PHYSICAL EXAM (7 for level 4, 8 or more for level 5)   VITAL SIGNS: /71 (Site: Right Wrist, Position: Sitting, Cuff Size: Medium Adult)   Pulse 106   Temp 98.4 °F (36.9 °C) (Temporal)   Ht 5' 4\" (1.626 m)   Wt 151 lb 9.6 oz (68.8 kg)   BMI 26.02 kg/m²       Wt Readings from Last 3 Encounters:   10/02/20 151 lb 9.6 oz (68.8 kg)   09/29/20 152 lb (68.9 kg)   02/27/20 153 lb (69.4 kg)      Constitutional: Well developed, Well nourished, No acute distress, Non-toxic appearance. HENT: Normocephalic, Atraumatic, Bilateral external ears normal, Oropharynx moist, No oral exudates, Nose normal.   Eyes: Conjunctiva normal, No discharge. Neck: Normal range of motion, No tenderness, Supple, No stridor. Lymphatic: No cervical, subclavian, or axillary lymphadenopathy. Cardiovascular: Normal heart rate, Normal rhythm, No murmurs, No rubs, No gallops. Thorax & Lungs: Normal breath sounds, No respiratory distress, No wheezing, No chest tenderness. No gynecomastia. Abdomen/GI: scars consistent with stated surgeries, no hernias, no HSM, soft NTND   Rectal:  Deferred. Skin: Warm, Dry, No erythema, No rash. No bruising. No spider hemangiomas. Back: No tenderness, No CVA tenderness. Lower Extremities: Intact distal pulses, No edema, No tenderness, No cyanosis, No clubbing. Neurologic: Alert & oriented x 3, Normal motor function, Normal sensory function, No focal deficits noted. No asterixis.   RADIOLOGY/PROCEDURES         FINAL IMPRESSION             Orders Placed This Encounter Procedures    Covid-19 Ambulatory       Standing Status:   Future       Standing Expiration Date:   10/2/2021       Scheduling Instructions:         Saline media preferred given current shortage of viral transport media but both acceptable       Order Specific Question:   Status       Answer:   Asymptomatic/Surveillance (e.g. pre-op/pre-procedure, pre-delivery, transfer)       Order Specific Question:   Reason for Test       Answer:   Upcoming elective surgery/procedure/delivery, return to work, or discharge to another facility    EGD       Scheduling Instructions:         Please provide prep of choice instructions and prescription.                     General guidelines for holding blood thinners/anticoagulants around endoscopic procedure are but patients are encouraged to check with their prescribing physician.                   The patient may hold Plavix, Effient, Brilinta 5 days prior to the procedure unless:          A drug eluting stent has been placed within past 12 months.         A nondrug eluting stent has been placed within past 1 month.         Coumadin may be held 4 days prior to the procedure unless:           Mechanical mitral valve replacement (requires heparin bridge while Coumadin held and is managed by pharmacy)         Pradaxa, Xarelto, Eliquis may be held 2-3 days prior to procedure. According to pharmacokinetics of the drug, package insert, cardiology practice patterns, and T1/2 of theses drugs (12 hrs), Eliquis and Xarelto are held 48hrs prior to any procedure, including major surgical procedures w/o          increased bleeding.  That is usually the standard of care, as coagulation would/should be normalized at 48hrs.         Every attempt should be made to maintain ASA 81mg per day throughout the zach-operative period in patients with diagnosis of ASHD.           These recommendations may need to be modified by the provider/ based on risk /benefit analysis of the procedure and

## 2020-10-27 NOTE — PROGRESS NOTES
Pt d/c'd home. Removed left AC  IV and stopped bleeding. Catheter intact. Pt tolerated well. No redness noted at siteReviewed d/c instructions, home meds, and  f/u information utilizing teach-back method. Patient verbalized understanding. Instructions reviewed with pt's .

## 2020-10-28 NOTE — OP NOTE
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 Not dilated without complaints of esophageal dysphagia.     Specimens: Was not Obtained     Complications:   None; patient tolerated the procedure well.     Disposition:   PACU - hemodynamically stable.     Estimated Blood loss:  none     Impression:   -See post-procedure diagnoses.     Recommendations:  -Acid suppression with a proton pump inhibitor twice a day. -GERD diet: avoid carbonated/acid beverages, fried and fatty foods.  peppermint, chocolate, alcohol, coffee, citrus fruits and juices, tomoato products; avoid lying down for 2 to 3 hours after eating, avoid tight fitting clothing.    -Follow up with me in the office in a month.          Juan Soto 10/27/20 12:17 PM EDT

## 2020-10-29 RX ORDER — OMEPRAZOLE 20 MG/1
20 CAPSULE, DELAYED RELEASE ORAL
Qty: 60 CAPSULE | Refills: 1 | Status: SHIPPED | OUTPATIENT
Start: 2020-10-29 | End: 2020-11-04 | Stop reason: SDUPTHER

## 2020-11-04 RX ORDER — OMEPRAZOLE 20 MG/1
20 CAPSULE, DELAYED RELEASE ORAL
Qty: 180 CAPSULE | Refills: 1 | Status: SHIPPED | OUTPATIENT
Start: 2020-11-04 | End: 2022-07-18

## 2020-11-24 ENCOUNTER — OFFICE VISIT (OUTPATIENT)
Dept: GASTROENTEROLOGY | Age: 75
End: 2020-11-24
Payer: MEDICARE

## 2020-11-24 VITALS
BODY MASS INDEX: 25.35 KG/M2 | HEART RATE: 94 BPM | WEIGHT: 148.5 LBS | DIASTOLIC BLOOD PRESSURE: 58 MMHG | TEMPERATURE: 97.1 F | HEIGHT: 64 IN | SYSTOLIC BLOOD PRESSURE: 105 MMHG

## 2020-11-24 PROCEDURE — G8417 CALC BMI ABV UP PARAM F/U: HCPCS | Performed by: INTERNAL MEDICINE

## 2020-11-24 PROCEDURE — G8427 DOCREV CUR MEDS BY ELIG CLIN: HCPCS | Performed by: INTERNAL MEDICINE

## 2020-11-24 PROCEDURE — 1123F ACP DISCUSS/DSCN MKR DOCD: CPT | Performed by: INTERNAL MEDICINE

## 2020-11-24 PROCEDURE — G8484 FLU IMMUNIZE NO ADMIN: HCPCS | Performed by: INTERNAL MEDICINE

## 2020-11-24 PROCEDURE — 4040F PNEUMOC VAC/ADMIN/RCVD: CPT | Performed by: INTERNAL MEDICINE

## 2020-11-24 PROCEDURE — 1090F PRES/ABSN URINE INCON ASSESS: CPT | Performed by: INTERNAL MEDICINE

## 2020-11-24 PROCEDURE — 99213 OFFICE O/P EST LOW 20 MIN: CPT | Performed by: INTERNAL MEDICINE

## 2020-11-24 PROCEDURE — G8399 PT W/DXA RESULTS DOCUMENT: HCPCS | Performed by: INTERNAL MEDICINE

## 2020-11-24 PROCEDURE — 1036F TOBACCO NON-USER: CPT | Performed by: INTERNAL MEDICINE

## 2020-11-24 NOTE — PROGRESS NOTES
[address] [facility],  [address] [place] [address] Bettina Segovia, [address] [place] [address]  Phone: [place] [address]     Chief Complaint   Patient presents with    Follow-up     things are going well has only thrown up once       HPI (location/symptom, timing/onset, duration, quality/severity, context, modifying factors, and associated signs/symptoms)     Thank you Yesi Cabrera MD for asking me to see Alise Gibson in consultation. She is a  [2] White [1] 66 y.o. [place] female seen independently who presents with the following GI complaints:    Alise Gibson  Is here for follow up of endoscopic documented esophagitis. No associated dysphagia. Modifying factors - improved with increase to bid therapy. No consuming carbonated beverages. Vomiting occasionally but much less. Last Encounter Reviewed: 10/2/2020 Alise Gibson  Complains of frequent am vomiting for years that has become worse the past year. There is associated breakthrough heartburn on a chronic ppi daily. Pepcid was recently added at night without improvement. Symptoms are moderate . No dysphagia, cough, chest pain, globus, regurgitation. No asa or nsaid use. Follows a gerd diet. No additional modifying/relieving factors. Ct chest abd 3yo showed a small hiatal hernia.       Last Encounter Reviewed:   Pertinent PMH, FH, SH is reviewed below. 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  Not dilated without complaints of esophageal dysphagia. Last Colonoscopy: 3/2003    Review of available records reveals:   Wt Readings from Last 50 Encounters:   11/24/20 148 lb 8 oz (67.4 kg)   10/27/20 150 lb (68 kg)   10/02/20 151 lb 9.6 oz (68.8 kg)   09/29/20 152 lb (68.9 kg)   02/27/20 153 lb (69.4 kg)   08/27/19 153 lb 12.8 oz (69.8 kg)   03/14/19 147 lb 0.3 oz Fractures     Hyperlipidemia     Hyperlipidemia     IBS (irritable bowel syndrome)     Insomnia     Migraine     past hx    Reflux     Shingles 11/2013    Left Face.     SOB (shortness of breath) 03/23/2017    GXT  Normal     FAMILY HISTORY     Family History   Problem Relation Age of Onset    High Blood Pressure Mother     Heart Disease Mother [de-identified]        CABG    Diabetes Mother     Heart Disease Maternal Grandmother      SOCIAL HISTORY     Social History     Socioeconomic History    Marital status:      Spouse name: Not on file    Number of children: Not on file    Years of education: Not on file    Highest education level: Not on file   Occupational History    Not on file   Social Needs    Financial resource strain: Not on file    Food insecurity     Worry: Not on file     Inability: Not on file    Transportation needs     Medical: Not on file     Non-medical: Not on file   Tobacco Use    Smoking status: Never Smoker    Smokeless tobacco: Never Used   Substance and Sexual Activity    Alcohol use: No    Drug use: No    Sexual activity: Not on file   Lifestyle    Physical activity     Days per week: Not on file     Minutes per session: Not on file    Stress: Not on file   Relationships    Social connections     Talks on phone: Not on file     Gets together: Not on file     Attends [employer] service: Not on file     Active member of club or organization: Not on file     Attends meetings of clubs or organizations: Not on file     Relationship status: Not on file    Intimate partner violence     Fear of current or ex partner: Not on file     Emotionally abused: Not on file     Physically abused: Not on file     Forced sexual activity: Not on file   Other Topics Concern    Not on file   Social History Narrative    Not on file     SURGICAL HISTORY     Past Surgical History:   Procedure Laterality Date    BRONCHOSCOPY  9/11/14    Right Middle Lobe Volume Loss:Negative Pathology    Teofilo Glassing) 11/16/2015    Pneumococcal Polysaccharide ([id number]) 12/01/2008    Td, unspecified formulation 01/17/2009     REVIEW OF SYSTEMS (2-9 systems for level 4, 10 or more for level 5)   See HPI for further details and pertinent postiives. Negative for the following:  Constitutional: Negative for weight change. Negative for appetite change and fatigue. HENT: Negative for nosebleeds, sore throat, mouth sores, and voice change. Respiratory: Negative for cough, choking and chest tightness. Cardiovascular: Negative for chest pain   Gastrointestinal:  No abdominal pain, heartburn, bloating, dysphagia, cough, chest pain, globus, regurgitation, diarrhea, constipation, nausea, or vomiting. Musculoskeletal: Negative for arthralgias. Skin: Negative for pallor. Neurological: Negative for weakness and light-headedness. Hematological: Negative for adenopathy. Does not bruise/bleed easily. Psychiatric/Behavioral: Negative for suicidal ideas. PHYSICAL EXAM (7 for level 4, 8 or more for level 5)   VITAL SIGNS: BP (!) 105/58 (Site: Right Wrist, Position: Sitting, Cuff Size: Medium Adult)   Pulse 94   Temp 97.1 °F (36.2 °C) (Temporal)   Ht 5' 4\" (1.626 m)   Wt 148 lb 8 oz (67.4 kg)   BMI 25.49 kg/m²   Wt Readings from Last 3 Encounters:   11/24/20 148 lb 8 oz (67.4 kg)   10/27/20 150 lb (68 kg)   10/02/20 151 lb 9.6 oz (68.8 kg)     Constitutional: Well developed, Well nourished, No acute distress, Non-toxic appearance. HENT: Normocephalic, Atraumatic, Bilateral external ears normal, Oropharynx moist, No oral exudates, Nose normal.   Eyes: Conjunctiva normal, No discharge. Neck: Normal range of motion, No tenderness, Supple, No stridor. Lymphatic: No cervical, subclavian, or axillary lymphadenopathy. Cardiovascular: Normal heart rate, Normal rhythm, No murmurs, No rubs, No gallops. Thorax & Lungs: Normal breath sounds, No respiratory distress, No wheezing, No chest tenderness.  No gynecomastia. Abdomen/GI: scars consistent with stated surgeries, no hernias, no HSM, soft NTND   Rectal:  Deferred. Skin: Warm, Dry, No erythema, No rash. No bruising. No spider hemangiomas. Back: No tenderness, No CVA tenderness. Lower Extremities: Intact distal pulses, No edema, No tenderness, No cyanosis, No clubbing. Neurologic: Alert & oriented x 3, Normal motor function, Normal sensory function, No focal deficits noted. No asterixis. RADIOLOGY/PROCEDURES       FINAL IMPRESSION   No orders of the defined types were placed in this encounter. Dominic Bernal was seen today for follow-up. Diagnoses and all orders for this visit:    Esophagitis, [place] grade B    Continue bid therapy. ORDERED FUTURE/PENDING TESTS     Lab Frequency Next Occurrence       FOLLOWUP   Return in about 1 year (around 11/24/2021).           Juan Soto 11/24/20 12:47 PM EST    CC:  Rachel Spain MD

## 2020-11-24 NOTE — PATIENT INSTRUCTIONS
[address] ,  [address] [facility]  Phone: [phone] [facility],  [address] [facility], [facility]  Phone: [phone]   List of hospitals in the [place]:[phone]    Here are tips to help improve your GERD (gastroesophageal reflux disease) symptoms:    - Avoid eating or drinking 2-3 hours before bedtime or times of recumbency (laying down). Anything that is still sitting in your stomach when laying down at bedtime, even just liquids like water, can reflux back up into the esophagus. When laying down, gravity is working against you, and liquid in the stomach can easily move into the esophagus.  - Proper diet modification. Things that can worsen reflux include caffeine, carbonated beverages, chocolate, alcohol, nicotine, and NSAID usage (ex: ibuprofen, aspirin, naproxen, Excedrin, Mobic, etc.). Spicy foods and red sauce can also worsen symptoms in some patients. None of these foods cause direct damage to the esophagus. It is acid that comes back that causes the symptoms and damage. Unless otherwise directed, I usually advise patient's to let their is symptoms be the guide in diet modification to see avoidance of which foods produce the most favorable outcome. In general, I do usually recommend minimizing caffeine and carbonate beverage intake. If you have persistent reflux symptoms, I recommend limiting intake to no more than one cup of each daily, if possible. I do also recommend minimizing NSAID usage as this can cause direct damage to the lining of the GI tract. - Weight loss. Usually when we gain weight, there is excess fat deposited in the abdominal cavity. This accumulates and puts increased pressure on the stomach, which then puts pressure on stomach contents to go the path of least resistance, upward through the esophagus. When stomach contents going to the esophagus, that is when reflux or heartburn symptoms develop.   Think of a pregnant woman in her third trimester, who often complains of bad heartburn symptoms. This occurs because the baby is putting significant amount of pressure on the stomach. Similarly, excess fat can have the same effect. Weight loss, even just 10% of weight loss, can sometimes have a significant effect in your reflux symptoms. Along the same lines, sometimes some patients have improvement in reflux symptoms if their chronic constipation is treated successfully as a colon full of stool can also increase intra-abdominal pressure, and pressure on the stomach. - Taking the right medicine at the right time. It is best to take any acid blocker medicine that is prescribed before meals, such as breakfast or dinner if you take a dose in the evening. The medicine works best in stopping the cells in the stomach from making acid which usually gets turned on and activated with food. If you forget to take it before the meal, it is still okay to take it, just may not be as effective as if you took it earlier. Finding the right medicine combination for you is not an exact science, and sometimes is a process of trial and error. Often, insurance companies require failure with certain medications before allowing coverage of other medications. Your participation and providing feedback regarding your response to medications tried, in the context of the above lifestyle modifications, is helpful in finding the right medication for you. I usually recommend a two to four-week trial of the particular medication before stating you failed it and switching to another medication. If you believe your symptoms have not responded to a particular medication after an adequate trial, you can call my office to see if there is an alternative medication to try. GASTROESOPHAGEAL REFLUX OVERVIEW  Gastroesophageal reflux, also known as acid reflux, occurs when the stomach contents reflux or back up into the esophagus and/or mouth.  Reflux is a normal process that occurs in healthy infants, children, and adults. Most episodes are brief and do not cause bothersome symptoms or complications. In contrast, people with gastroesophageal reflux disease (GERD) experience symptoms as a result of the reflux. Symptoms can include heartburn, vomiting, or pain with swallowing. The reflux of stomach acid can adversely affect the vocal cords or even be inhaled into the lungs (called aspiration). This topic review discusses the symptoms, causes, diagnosis, and treatment of adults with gastroesophageal reflux disease. A discussion of gastroesophageal reflux in infants, children, and adolescents is available separately. WHAT IS GASTROESOPHAGEAL REFLUX? When we eat, food is carried from the mouth to the stomach through the esophagus, a tube-like structure that is approximately 10 inches long and 1 inch wide in adults. The esophagus is made of tissue and muscle layers that expand and contract to propel food to the stomach through a series of wave-like movements called peristalsis. At the lower end of the esophagus, where it joins the stomach, there is a circular ring of muscle called the lower esophageal sphincter (LES). After swallowing, the LES relaxes to allow food to enter the stomach and then contracts to prevent the back-up of food and acid into the esophagus. However, sometimes the LES is weak or becomes relaxed because the stomach is distended, allowing liquids in the stomach to wash back into the esophagus occasionally in all individuals. Most of these episodes occur shortly after meals, are brief, and do not cause symptoms. Normally, acid reflux should occur only rarely during sleep. Acid reflux - Acid reflux becomes gastroesophageal reflux disease (GERD) when it causes bothersome symptoms or injury to the esophagus. The amount of acid reflux required to cause GERD varies.   In general, damage to the esophagus is more likely to occur when acid refluxes frequently, the reflux is very acidic, or the esophagus is unable to clear away the acid quickly. The most common symptoms associated with acid reflux are heartburn, regurgitation, chest pain, and trouble swallowing. The treatments of GERD are designed to prevent one or all of these symptoms from occurring. Hiatus hernia - The diaphragm is a large flat muscle at the base of the lungs that contracts and relaxes as a person breathes in and out. The esophagus passes through an opening in the diaphragm called the diaphragmatic hiatus before it joins with the stomach. Normally, the diaphragm contracts, which improves the strength of the LES, especially during bending, coughing, or straining. If there is a weakening in the diaphragm muscle at the hiatus, the stomach may be able to partially slip through the diaphragm into the chest, forming a sliding hiatus hernia. The presence of a hiatus hernia makes acid reflux more likely. A hiatus hernia is more common in people over age 48. Obesity and pregnancy are also contributing factors. The exact cause is unknown but may be related to the loosening of the tissues around the diaphragm that occurs with advancing age. There is no way to prevent a hiatus hernia. ACID REFLUX SYMPTOMS  People who experience heartburn at least two to three times a week may have gastroesophageal reflux disease, or GERD. The most common symptom of GERD, heartburn, is estimated to affect 10 million adults in the [place] on a daily basis. Heartburn is experienced as a burning sensation in the center of the chest, which sometimes spreads to the throat; there also may be an acid taste in the throat.  Less common symptoms include:  Stomach pain (pain in the upper abdomen)   Non-burning chest pain   Difficulty swallowing (called dysphagia), or food getting stuck   Painful swallowing (called odynophagia)   Persistent laryngitis/hoarseness   Persistent sore throat   Chronic cough, new onset asthma, or asthma only at night   Regurgitation of foods/fluids; taste of acid in the throat   Sense of a lump in the throat   Worsening dental disease   Recurrent pneumonia   Chronic sinusitis   Waking up with a choking sensation  When to seek help - The following signs and symptoms may indicate a more serious problem, and should be reported to a healthcare provider immediately:  Difficulty or pain with swallowing (feeling that food gets \"stuck\")   Unexplained weight loss   Chest pain   Choking   Bleeding (vomiting blood or dark-colored stools)    ACID REFLUX DIAGNOSIS  Acid reflux is usually diagnosed based upon symptoms and the response to treatment. In people who have symptoms of acid reflux but no evidence of complications, a trial of treatment with lifestyle changes and in some cases, a medication, are often recommended, without testing. Specific testing is required when the diagnosis is unclear or if there are more serious signs or symptoms as described above. It is important to rule out potentially life threatening problems that can cause symptoms similar to those of gastroesophageal reflux disease. This is particularly true with chest pain, since chest pain can also be a symptom of heart disease. When the symptoms are not life threatening and the diagnosis of gastroesophageal reflux disease is not clear, one or more of the following tests may be recommended. Endoscopy - An upper endoscopy is commonly used to evaluate the esophagus. A small, flexible tube is passed into the esophagus, stomach, and small intestine. The tube has a light source and a camera that displays magnified images. Damage to the lining of these structures can be evaluated and a small sample of tissue (biopsy) can be taken to determine the extent of tissue damage.   24-hour esophageal pH study - A 24-hour esophageal pH study is the most direct way to measure the frequency of acid reflux, although the study is not always helpful in diagnosing gastroesophageal reflux disease or reflux-associated problems. It is usually reserved for people whose diagnosis is unclear after endoscopy or a trial of treatment. It is also useful for people who continue to have symptoms despite treatment. The test involves inserting a thin tube through the nose and into the esophagus. The tube is left in the esophagus for 24 hours. During this time the patient keeps a diary of symptoms. The tube is attached to a small device that measures how much stomach acid is reaching the esophagus. The data are then analyzed to determine the frequency of reflux and the relationship of reflux to symptoms. An alternate method for measuring pH uses a device that is attached to the esophagus and broadcasts pH information to a monitor worn outside of the body. This avoids the need for a tube in the esophagus and nose. The main disadvantage is that an endoscopy procedure is required to place the device (it does not require removal, but simply passes on its own in the stool). Esophageal manometry - Esophageal manometry involves swallowing a tube that measures the muscle contractions of the esophagus. This can help to determine if the lower esophageal sphincter is functioning properly. This test is usually reserved for people in whom the diagnosis is unclear after other testing or in whom surgery is being considered. ACID REFLUX COMPLICATIONS  The vast majority of patients with gastroesophageal reflux disease will not develop serious complications, particularly when reflux is adequately treated. However, a number of serious complications can arise in patients with severe gastroesophageal reflux disease. Ulcers - Ulcers can form in the esophagus as a result of burning from stomach acid. In some cases, bleeding occurs. You may not be aware of bleeding, but it may be detected in a stool sample with a test called Hemoccult.  This test is performed by putting a small amount of stool on a chemically coated card.  Stricture - Damage from acid can cause the esophagus to scar and narrow, causing a blockage (stricture) that can cause food or pills to get stuck in the esophagus. The narrowing is caused by scar tissue that develops as a result of ulcers that repeatedly damage and then heal in the esophagus. Lung and throat problems - Some people reflux acid into the throat, causing inflammation of the vocal cords, a sore throat, or a hoarse voice. The acid can be inhaled into the lungs and cause a type of pneumonia (aspiration pneumonia) or asthma symptoms. Chronic acid reflux into the lungs may eventually cause permanent lung damage, called pulmonary fibrosis or bronchiectasis. Lehman's esophagus - Lehman's esophagus occurs when the normal cells that line the lower esophagus (squamous cells) are replaced by a different cell type (intestinal cells). This process usually results from repeated damage to the esophageal lining, and the most common cause is longstanding gastroesophageal reflux disease. The intestinal cells have a small risk of transforming into cancer cells. As a result, people with Lehman's esophagus are advised to have a periodic endoscopy to monitor for early warning signs of cancer. Esophageal cancer - There are two main types of esophageal cancer: adenocarcinoma and squamous cell carcinoma. A major risk factor for adenocarcinoma is Lehman's esophagus, discussed above. Squamous cell carcinoma does not appear to be related to GERD. Unfortunately, adenocarcinoma of the esophagus is on the rise in the [place] and in many other countries. However, only a small percentage of people with GERD will develop Lehman's esophagus and an even smaller percentage will develop adenocarcinoma. REFLUX TREATMENT  Gastroesophageal reflux disease is treated according to its severity.   Mild symptoms - Initial treatments for mild acid reflux include dietary changes and using non-prescription medications, shoulders higher than the stomach, allowing gravity to prevent acid from refluxing. ? ? Raising the head of the bed can be done with blocks of wood under the legs of the bed or a foam wedge under the mattress. Several manufacturers have developed commercial products for this purpose. However, it is not helpful to use additional pillows; this can cause an unnatural bend in the body that increases pressure on the stomach, worsening acid reflux. Avoid acid reflux inducing foods - Some foods also cause relaxation of the lower esophageal sphincter, promoting acid reflux. Excessive caffeine, chocolate, alcohol, peppermint, and fatty foods may cause bothersome acid reflux in some people. Quit smoking - Saliva helps to neutralize refluxed acid, and smoking reduces the amount of saliva in the mouth and throat. Smoking also lowers the pressure in the lower esophageal sphincter and provokes coughing, causing frequent episodes of acid reflux in the esophagus. Quitting smoking can reduce or eliminate symptoms of mild reflux. Avoid large and late meals - Lying down with a full stomach may increase the risk of acid reflux. By eating three or more hours before bedtime, reflux may be reduced. In addition, eating smaller meals may prevent the stomach from becoming overdistended, which can cause acid reflux. Avoid tight fitting clothing - At a minimum, tight fitting clothing can increase discomfort, but it may also increase pressure in the abdomen, forcing stomach contents into the esophagus. Chew gum or use oral lozenges - Chewing gum or using lozenges can increase saliva production, which may help to clear stomach acid that has entered the esophagus.   Moderate to severe symptoms - Patients with moderate to severe symptoms of acid reflux, complications of gastroesophageal reflux disease, or mild acid reflux symptoms that have not responded to the lifestyle modifications and the medications described above usually require treatment with prescription medications. Most patients are treated with a proton pump inhibitor  Proton pump inhibitors - PPIs include omeprazole (Prilosec®), esomeprazole (Nexium®), lansoprazole (Prevacid®), dexlansoprazole (Junius Majestic), pantoprazole (Protonix®), and rabeprazole (AcipHex®), which are stronger and more effective than the H2 antagonists. Once the optimal dose and type of PPI is found, you will probably be kept on the PPI for approximately eight weeks. Depending upon your symptoms after eight weeks, the medication dose may be decreased or discontinued. If symptoms return within three months, long-term treatment is usually recommended. If symptoms do not return within three months, treatment may be needed only intermittently. The goal of treatment for GERD is to take the lowest possible dose of medication that controls symptoms and prevents complications. Proton pump inhibitors are safe, although they may be expensive, especially if taken for a long period of time. Long-term risks of PPIs may include an increased risk of gut infections, such as Clostridium (C. diff), or reduced absorption of nutrients. In general, these risks are small to nonexistent. However, even a small risk emphasizes the need to take the lowest possible dose for the shortest possible time. If symptoms are not controlled - If your symptoms of gastroesophageal reflux disease are not adequately controlled with one PPI, one or more of the following may be recommended:   An alternate PPI may be prescribed or the dose of the PPI may be increased   The PPI may be given twice per day instead of once   Further testing may be recommended to confirm the diagnosis and/or determine if another problem is causing symptoms   Surgical treatment may be considered  Surgical treatment - Prior to the development of the potent acid-reducing medications described above, surgery was used for severe cases of GERD that did not resolve with medical treatment. Because of the effectiveness of medical therapy, the role of surgery has become more complex. In general, anti-reflux surgery involves repairing the hiatus hernia and strengthening the lower esophageal sphincter. The most common surgical treatment is the laparoscopic Nissen fundoplication. This procedure involves wrapping the upper part of the stomach around the lower end of the esophagus. Although the outcome of surgery is usually good, complications can occur. Examples include persistent difficulty swallowing (occurring in about 5 percent of patients), a sense of bloating and gas (known as \"gas-bloat syndrome\"), breakdown of the repair (1 to 2 percent of patients per year), or diarrhea due to inadvertent injury to the nerves leading to the stomach and intestines. WHERE TO GET MORE INFORMATION  Your healthcare provider is the best source of information for questions and concerns related to your medical problem. This article will be updated as needed every four months on our web site ([email or web address]). ? The following organizations also provide reliable health information. [employer]  (www.nlm.nih.gov/medlineplus/gerd.html, available in [address])  [employer] of Diabetes and Digestive and Kidney Diseases  (http://digestive. niddk.nih.gov/ddiseases/pubs/gerd/)  The American Gastroenterological Association  (https://www.blackwell.org/)  [employer] of [employer]astro[employer]ology  (www.acg.gi.org/patients/patientinfo/gerd. asp)    Gastroesophageal reflux disease (GERD) happens when acid from your stomach flows up into the esophagus. When acid comes in contact with the esophagus, the acid causes soreness (inflammation) in the esophagus. Over time, GERD may create small holes (ulcers) in the lining of the esophagus. CAUSES   Increased body weight.  This puts pressure on the stomach, making acid rise from the stomach into the esophagus. Smoking. This increases acid production in the stomach. Drinking alcohol. This causes decreased pressure in the lower esophageal sphincter (valve or ring of muscle between the esophagus and stomach), allowing acid from the stomach into the esophagus. Late evening meals and a full stomach. This increases pressure and acid production in the stomach. A malformed lower esophageal sphincter. Sometimes, no cause is found. SYMPTOMS   Burning pain in the lower part of the mid-chest behind the breastbone and in the mid-stomach area. This may occur twice a week or more often. Trouble swallowing. Sore throat. Dry cough. Asthma-like symptoms including chest tightness, shortness of breath, or wheezing. DIAGNOSIS   Your caregiver may be able to diagnose GERD based on your symptoms. In some cases, X-rays and other tests may be done to check for complications or to check the condition of your stomach and esophagus. TREATMENT   Your caregiver may recommend over-the-counter or prescription medicines to help decrease acid production. Ask your caregiver before starting or adding any new medicines. HOME CARE INSTRUCTIONS   Change the factors that you can control. Ask your caregiver for guidance concerning weight loss, quitting smoking, and alcohol consumption. Avoid foods and drinks that make your symptoms worse, such as:   Caffeine or alcoholic drinks. Chocolate. Peppermint or mint flavorings. Garlic and onions. Spicy foods. Citrus fruits, such as oranges, dejan, or limes. Tomato-based foods such as sauce, chili, salsa, and pizza. Fried and fatty foods. Avoid lying down for the 3 hours prior to your bedtime or prior to taking a nap. Eat small, frequent meals instead of large meals. Wear loose-fitting clothing. Do not wear anything tight around your waist that causes pressure on your stomach. Raise the head of your bed 6 to 8 inches with wood blocks to help you sleep. Extra pillows will not help. Only take over-the-counter or prescription medicines for pain, discomfort, or fever as directed by your caregiver. Do not take aspirin, ibuprofen, or other nonsteroidal anti-inflammatory drugs (NSAIDs). SEEK IMMEDIATE MEDICAL CARE IF:   You have pain in your arms, neck, jaw, teeth, or back. Your pain increases or changes in intensity or duration. You develop nausea, vomiting, or sweating (diaphoresis). You develop shortness of breath, or you faint. Your vomit is green, yellow, black, or looks like coffee grounds or blood. Your stool is red, bloody, or black. These symptoms could be signs of other problems, such as heart disease, gastric bleeding, or esophageal bleeding. MAKE SURE YOU:   Understand these instructions. Will watch your condition. Will get help right away if you are not doing well or get worse. Document Released: 09/27/2006 Document Revised: 03/11/2013 Document Reviewed: 07/06/2012   [facility] Patient Information ©2013 [employer].

## 2021-01-11 ENCOUNTER — OFFICE VISIT (OUTPATIENT)
Dept: ORTHOPEDIC SURGERY | Age: 76
End: 2021-01-11
Payer: MEDICARE

## 2021-01-11 VITALS — WEIGHT: 148 LBS | BODY MASS INDEX: 25.27 KG/M2 | HEIGHT: 64 IN

## 2021-01-11 DIAGNOSIS — M25.562 ACUTE PAIN OF LEFT KNEE: ICD-10-CM

## 2021-01-11 DIAGNOSIS — M22.42 CHONDROMALACIA PATELLAE OF LEFT KNEE: ICD-10-CM

## 2021-01-11 DIAGNOSIS — M65.9 SYNOVITIS OF LEFT KNEE: ICD-10-CM

## 2021-01-11 DIAGNOSIS — M17.12 PRIMARY OSTEOARTHRITIS OF LEFT KNEE: ICD-10-CM

## 2021-01-11 DIAGNOSIS — M25.562 ACUTE PAIN OF LEFT KNEE: Primary | ICD-10-CM

## 2021-01-11 PROCEDURE — G8427 DOCREV CUR MEDS BY ELIG CLIN: HCPCS | Performed by: FAMILY MEDICINE

## 2021-01-11 PROCEDURE — G8399 PT W/DXA RESULTS DOCUMENT: HCPCS | Performed by: FAMILY MEDICINE

## 2021-01-11 PROCEDURE — 99203 OFFICE O/P NEW LOW 30 MIN: CPT | Performed by: FAMILY MEDICINE

## 2021-01-11 PROCEDURE — 1036F TOBACCO NON-USER: CPT | Performed by: FAMILY MEDICINE

## 2021-01-11 PROCEDURE — G8484 FLU IMMUNIZE NO ADMIN: HCPCS | Performed by: FAMILY MEDICINE

## 2021-01-11 PROCEDURE — G8417 CALC BMI ABV UP PARAM F/U: HCPCS | Performed by: FAMILY MEDICINE

## 2021-01-11 PROCEDURE — 1090F PRES/ABSN URINE INCON ASSESS: CPT | Performed by: FAMILY MEDICINE

## 2021-01-11 PROCEDURE — 20610 DRAIN/INJ JOINT/BURSA W/O US: CPT | Performed by: FAMILY MEDICINE

## 2021-01-11 PROCEDURE — 1123F ACP DISCUSS/DSCN MKR DOCD: CPT | Performed by: FAMILY MEDICINE

## 2021-01-11 PROCEDURE — 4040F PNEUMOC VAC/ADMIN/RCVD: CPT | Performed by: FAMILY MEDICINE

## 2021-01-11 RX ORDER — LIDOCAINE HYDROCHLORIDE 10 MG/ML
1 INJECTION, SOLUTION INFILTRATION; PERINEURAL ONCE
Status: COMPLETED | OUTPATIENT
Start: 2021-01-11 | End: 2021-01-11

## 2021-01-11 RX ORDER — MELOXICAM 15 MG/1
15 TABLET ORAL DAILY
Qty: 30 TABLET | Refills: 3 | Status: SHIPPED | OUTPATIENT
Start: 2021-01-11 | End: 2021-01-14

## 2021-01-11 RX ORDER — BETAMETHASONE SODIUM PHOSPHATE AND BETAMETHASONE ACETATE 3; 3 MG/ML; MG/ML
12 INJECTION, SUSPENSION INTRA-ARTICULAR; INTRALESIONAL; INTRAMUSCULAR; SOFT TISSUE ONCE
Status: COMPLETED | OUTPATIENT
Start: 2021-01-11 | End: 2021-01-11

## 2021-01-11 RX ORDER — BUPIVACAINE HYDROCHLORIDE 2.5 MG/ML
2 INJECTION, SOLUTION INFILTRATION; PERINEURAL ONCE
Status: COMPLETED | OUTPATIENT
Start: 2021-01-11 | End: 2021-01-11

## 2021-01-11 RX ADMIN — LIDOCAINE HYDROCHLORIDE 1 ML: 10 INJECTION, SOLUTION INFILTRATION; PERINEURAL at 14:55

## 2021-01-11 RX ADMIN — BUPIVACAINE HYDROCHLORIDE 5 MG: 2.5 INJECTION, SOLUTION INFILTRATION; PERINEURAL at 14:54

## 2021-01-11 RX ADMIN — BETAMETHASONE SODIUM PHOSPHATE AND BETAMETHASONE ACETATE 12 MG: 3; 3 INJECTION, SUSPENSION INTRA-ARTICULAR; INTRALESIONAL; INTRAMUSCULAR; SOFT TISSUE at 14:54

## 2021-01-11 NOTE — PROGRESS NOTES
Chief Complaint  Knee Pain (OPNP LEFT KNEE)      Initial consultation persistent left knee pain with pseudobuckling and crepitation with mild swelling    History of Present Illness:  Luís Krishnan is a 66 y.o. 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 Ramona Rowe who is being seen today for evaluation of ongoing pain to her left knee. She does have a history of significant patellofemoral arthropathy and knee osteoarthritis to her contralateral right knee treated with a one-time steroid injection by Dr. Suzie Dozier in 2015. She states that a week ago on about 1/4/2020 when she was kneeling down vacuuming some carpeted steps at home when she felt a pop primarily to the anterior portion of her left knee prompting today's visit. She has had a fairly consistent achy 5-6 out of 10 pain with her knee with pain with positional changes distance walking crossing her legs walking on uneven surfaces and going up and down stairs. She has taken some Excedrin ice and Aspercreme but this has not improved her symptoms. Initially she was having some night pain but this has improved. No active locking or catching. Her symptoms have not substantially improved over the last week prompting today's visit and she is being seen today for orthopedic and sports consultation with updated imaging. She is not having any instability or buckling symptoms.       Pain Assessment  Location of Pain: Knee  Location Modifiers: Left  Severity of Pain: 5  Quality of Pain: Dull, Aching  Duration of Pain: A few minutes  Frequency of Pain: Several times daily  Aggravating Factors: Standing, Walking  Limiting Behavior: Yes  Relieving Factors: Rest  Result of Injury: No  Work-Related Injury: No  Are there other pain locations you wish to document?: No         Medical History     Patient's medications, allergies, past medical, surgical, social and family histories were reviewed and updated as appropriate. Review of Systems  Pertinent items are noted in HPI  Review of systems reviewed from Patient History Form dated on 1/11/2021 and available in the patient's chart under the Media tab. Vital Signs  There were no vitals filed for this visit. General Exam:     Constitutional: Patient is adequately groomed with no evidence of malnutrition  DTRs: Deep tendon reflexes are intact  Mental Status: The patient is oriented to time, place and person. The patient's mood and affect are appropriate. Lymphatic: The lymphatic examination bilaterally reveals all areas to be without enlargement or induration. Vascular: Examination reveals no swelling or calf tenderness. Peripheral pulses are palpable and 2+. Neurological: The patient has good coordination. There is no weakness or sensory deficit. Knee Examination  Inspection: No high-grade deformity although she does have a trace knee joint effusion. She does have patellofemoral crepitation. Palpation: Tender over the medial and lateral patellofemoral facet and does have pain reproduced with patellar grind testing and diffuse primarily anterior third medial joint line tenderness on the left with less lateral pain. Rang of Motion: She does have stiffness in the terminal 10 degrees of extension. Flexion to about 110 with patellofemoral crepitation. Hamstrings mildly tight. Strength: 4 out of 5 with flexion extension. Special Tests: Moderate pain with patellar grind testing and pain with medial Belén's. No high-grade click. Negative lateral Belén's. No instability. Screening hip testing reasonably benign. Skin: There are no rashes, ulcerations or lesions. Distal neurovascular exam is intact. Gait: Mild altalgia. Reflex symmetrically preserved    Additional Comments:     Additional Examinations:  Contralateral Exam: Examination of the right knee reveals intact skin. There is no focal tenderness.   The patient demonstrates full painless range of motion with regards to flexion and extension. Strength is 5/5 thorough out all planes. Ligamentous stability is grossly intact. Right Lower Extremity: Examination of the right lower extremity does not show any tenderness, deformity or injury. Range of motion is unremarkable. There is no gross instability. There are no rashes, ulcerations or lesions. Strength and tone are normal.  Left Lower Extremity: Examination of the left lower extremity does not show any tenderness, deformity or injury. Range of motion is unremarkable. There is no gross instability. There are no rashes, ulcerations or lesions. Strength and tone are normal.      Diagnostic Test Findings: Left knee AP and PA weightbearing sunrise and lateral films were obtained today and does show significant medial compartment narrowing. This is not quite bone-on-bone. She does have advanced bone-on-bone patellofemoral arthropathy on the left. Assessment : #1.  1 week status post aggravation significant medial compartment osteoarthritis with bone-on-bone patellofemoral arthropathy and the knee synovitis    Impression:  Encounter Diagnoses   Name Primary?  Acute pain of left knee Yes    Chondromalacia patellae of left knee     Primary osteoarthritis of left knee     Synovitis of left knee        Office Procedures:  Orders Placed This Encounter   Procedures    XR KNEE LEFT (MIN 4 VIEWS)     Standing Status:   Future     Number of Occurrences:   1     Standing Expiration Date:   1/11/2022     Order Specific Question:   Reason for exam:     Answer:   pain    CO ARTHROCENTESIS ASPIR&/INJ MAJOR JT/BURSA W/O US    EUFLEXXA INJ PER DOSE     LEFT KNEE     Standing Status:   Future     Standing Expiration Date:   1/11/2022       Treatment Plan:  Treatment options were discussed with Jinny Celis. We did review her plain films and exam findings. Does have quite prominent anterior medial compartment osteoarthritis.   We discussed further work-up with imaging however that given the degree of her degenerative changes in the fact that she is only been symptomatic for a week after doing kneeling activities, we will treat her initially conservatively. After discussing options, we did inject her left knee today using 2 cc of Celestone, 2 cc of Marcaine, 1 cc of Xylocaine. We held off on bracing and instruct her on a patellar protection program.  We will start her on meloxicam 15 mg daily although we need to be careful with this given her history of ulcers. She is on Prilosec twice daily. We may wish to consider viscosupplementation but we will see her back in 3 to 4 weeks for follow-up. She has done very well with her substantial arthritic changes on her contralateral right knee with a one-time steroid injection back in 2015. She would like to avoid surgery if at all possible. We will see her back in 3 to 4 weeks for follow-up but she will contact us in the interim with questions or concerns. This dictation was performed with a verbal recognition program (DRAGON) and it was checked for errors. It is possible that there are still dictated errors within this office note. If so, please bring any errors to my attention for an addendum. All efforts were made to ensure that this office note is accurate.

## 2021-01-14 ENCOUNTER — TELEPHONE (OUTPATIENT)
Dept: ORTHOPEDIC SURGERY | Age: 76
End: 2021-01-14

## 2021-01-14 RX ORDER — MELOXICAM 15 MG/1
15 TABLET ORAL DAILY
Qty: 90 TABLET | Refills: 1 | Status: SHIPPED | OUTPATIENT
Start: 2021-01-14 | End: 2021-01-19

## 2021-01-19 ENCOUNTER — OFFICE VISIT (OUTPATIENT)
Dept: INTERNAL MEDICINE CLINIC | Age: 76
End: 2021-01-19
Payer: MEDICARE

## 2021-01-19 VITALS
SYSTOLIC BLOOD PRESSURE: 120 MMHG | TEMPERATURE: 97 F | WEIGHT: 159 LBS | HEIGHT: 64 IN | BODY MASS INDEX: 27.14 KG/M2 | OXYGEN SATURATION: 93 % | HEART RATE: 81 BPM | DIASTOLIC BLOOD PRESSURE: 72 MMHG

## 2021-01-19 DIAGNOSIS — E78.5 HYPERLIPIDEMIA, UNSPECIFIED HYPERLIPIDEMIA TYPE: ICD-10-CM

## 2021-01-19 DIAGNOSIS — M19.90 ARTHRITIS: ICD-10-CM

## 2021-01-19 DIAGNOSIS — Z00.00 ROUTINE GENERAL MEDICAL EXAMINATION AT A HEALTH CARE FACILITY: Primary | ICD-10-CM

## 2021-01-19 DIAGNOSIS — R11.11 VOMITING WITHOUT NAUSEA, INTRACTABILITY OF VOMITING NOT SPECIFIED, UNSPECIFIED VOMITING TYPE: ICD-10-CM

## 2021-01-19 DIAGNOSIS — N18.30 STAGE 3 CHRONIC KIDNEY DISEASE, UNSPECIFIED WHETHER STAGE 3A OR 3B CKD (HCC): ICD-10-CM

## 2021-01-19 PROCEDURE — G8484 FLU IMMUNIZE NO ADMIN: HCPCS | Performed by: NURSE PRACTITIONER

## 2021-01-19 PROCEDURE — 1123F ACP DISCUSS/DSCN MKR DOCD: CPT | Performed by: NURSE PRACTITIONER

## 2021-01-19 PROCEDURE — 4040F PNEUMOC VAC/ADMIN/RCVD: CPT | Performed by: NURSE PRACTITIONER

## 2021-01-19 PROCEDURE — G0438 PPPS, INITIAL VISIT: HCPCS | Performed by: NURSE PRACTITIONER

## 2021-01-19 ASSESSMENT — LIFESTYLE VARIABLES: HOW OFTEN DO YOU HAVE A DRINK CONTAINING ALCOHOL: 0

## 2021-01-19 ASSESSMENT — PATIENT HEALTH QUESTIONNAIRE - PHQ9: 1. LITTLE INTEREST OR PLEASURE IN DOING THINGS: 0

## 2021-01-19 NOTE — PATIENT INSTRUCTIONS
for chart  Update labs in 6 months and return to see Dr. Farheen Weldon  Return in one year to see Yash Vegas for medicare wellness visit

## 2021-01-19 NOTE — PROGRESS NOTES
Medicare Annual Wellness Visit  Name: Roberth Bates Date: 2021   MRN: <[id number]> Sex: Female   Age: 66 y.o. Ethnicity: Non-/Non    : 1945 Race: Martina Whitley is here for Medicare AWV    Screenings for behavioral, psychosocial and functional/safety risks, and cognitive dysfunction are all negative except as indicated below. These results, as well as other patient data from the [address] form, are documented in Flowsheets linked to this Encounter. Allergies   Allergen Reactions    Amoxicillin Rash    Entex Pse [Pseudoephedrine-Guaifenesin]      gittery         Prior to Visit Medications    Medication Sig Taking? Authorizing Provider   omeprazole (PRILOSEC) 20 MG delayed release capsule Take 1 capsule by mouth 2 times daily (before meals) Yes Wilmer Cruz MD   FLUoxetine (PROZAC) 20 MG capsule TAKE ONE CAPSULE BY MOUTH EVERY DAY Yes Virginie Rowe MD   pravastatin (PRAVACHOL) 20 MG tablet TAKE 1 TABLET BY MOUTH EVERY EVENING FOR HIGH CHOLESTEROL Yes Oscar Rowe MD   buPROPion (WELLBUTRIN SR) 150 MG extended release tablet TAKE 1 TABLET BY MOUTH EVERY DAY FOR DEPRESSION Yes Oscar Rowe MD   traZODone (DESYREL) 50 MG tablet TAKE ONE AND ONE-HALF TABLETS BY MOUTH EVERY NIGHT AT BEDTIME  Patient taking differently: TAKE ONE AND ONE-HALF TABLETS BY MOUTH EVERY NIGHT AT BEDTIME  Pt taking one tablet Yes Virginie Rowe MD   Cetirizine HCl (ZYRTEC PO) Take 1 tablet by mouth daily. Yes Historical Provider, MD       Past Medical History:   Diagnosis Date    Anxiety     Arthritis     Cancer ([facility] [address]     skin    Cervical radiculopathy 2012    LT. c-5 c-6 MRI    Depression     Dupuytren's contracture     Fractures     Hyperlipidemia     Hyperlipidemia     IBS (irritable bowel syndrome)     Insomnia     Migraine     past hx    Reflux     Shingles 2013    Left Face.     SOB (shortness of breath) 2017    GXT  Normal       Past Surgical History: reactive to light, extraocular eye movements intact, conjunctivae normal  ENT: tympanic membrane, external ear and ear canal normal bilaterally, nose without deformity, nasal mucosa and turbinates normal without polyps  Neck: supple and non-tender without mass, no thyromegaly or thyroid nodules, no cervical lymphadenopathy  Pulmonary/Chest: clear to auscultation bilaterally- no wheezes, rales or rhonchi, normal air movement, no respiratory distress  Cardiovascular: normal rate, regular rhythm, normal S1 and S2, no murmurs, rubs, clicks, or gallops, distal pulses intact, no carotid bruits  Abdomen: soft, non-tender, non-distended, normal bowel sounds, no masses or organomegaly  Extremities: no cyanosis, clubbing or edema  Musculoskeletal: normal range of motion, no joint swelling, deformity or tenderness  Neurologic: reflexes normal and symmetric, no cranial nerve deficit, gait, coordination and speech normal    Patient's complete Health Risk Assessment and screening values have been reviewed and are found in Flowsheets. The following problems were reviewed today and where indicated follow up appointments were made and/or referrals ordered. Positive Risk Factor Screenings with Interventions:          General Health and ACP:  General  In general, how would you say your health is?: Very Good  In the past 7 days, have you experienced any of the following?  New or Increased Pain, New or Increased Fatigue, Loneliness, Social Isolation, Stress or Anger?: None of These  Do you get the social and emotional support that you need?: Yes  Do you have a Living Will?: Yes  Advance Directives     Power of [address] Will ACP-Advance Directive ACP-Power of     Not on File Not on File Not on File Not on File      General Health Risk Interventions:  · bring copy for chart    Health Habits/Nutrition:  Health Habits/Nutrition  Do you exercise for at least 20 minutes 2-3 times per week?: (!) No  Have you lost any weight Services Due: see orders and patient instructions/AVS.  . Recommended screening schedule for the next 5-10 years is provided to the patient in written form: see Patient Instructions/AVS.    1. Routine general medical examination at a health care facility  I have reviewed the patient's medical history in detail and updated the computerized patient record. Repeat AMW in one year    2. Hyperlipidemia, unspecified hyperlipidemia type  Update labs one week before OV with Dr. Josue Sommers in 6 months  - Lipid Panel; Future    3. Stage 3 chronic kidney disease, unspecified whether stage 3a or 3b CKD  Update labs one week before OV with Dr. Josue Sommers in 6 months  - Comprehensive Metabolic Panel; Future    4. Vomiting without nausea, intractability of vomiting not specified, unspecified vomiting type  Since last OV she has seen Dr. Елена Jimenez. She does have a sliding hernia, andSchatskis ring,   Sx are now controlled with prilosec bid    5. Arthritis  She has bilateral knee OA that has worsened [facility] [place] [facility] Yesterday she saw DR. Princess Noonan and had a cortisone shot to L knee. She is approved for gel shots but would like to get back to exercising and evaluate benefit before proceeding to injections. (when she was exercising before her knees did not bother her)  Previous neck pain has resolved with using chiropractic care.     Update labs and return in 6 months with Dr Soledad Small for [facility] in one year  FU as scheduled with Dr. Abigail Zuniga and Dr. Princess Noonan  Update Tdap at pharmacy  Obtain shingrix at [facility] copy of AD to be scanned to chart

## 2021-01-21 ENCOUNTER — TELEPHONE (OUTPATIENT)
Dept: ORTHOPEDIC SURGERY | Age: 76
End: 2021-01-21

## 2021-01-21 NOTE — TELEPHONE ENCOUNTER
General Question     Subject:RE: NANETTE INJ.   Patient and /or Facility Melissa Balderas  Contact [phone]:

## 2021-01-27 ENCOUNTER — OFFICE VISIT (OUTPATIENT)
Dept: ORTHOPEDIC SURGERY | Age: 76
End: 2021-01-27
Payer: MEDICARE

## 2021-01-27 VITALS — WEIGHT: 159 LBS | BODY MASS INDEX: 27.14 KG/M2 | HEIGHT: 64 IN

## 2021-01-27 DIAGNOSIS — M25.562 ACUTE PAIN OF LEFT KNEE: ICD-10-CM

## 2021-01-27 DIAGNOSIS — M17.12 PRIMARY OSTEOARTHRITIS OF LEFT KNEE: Primary | ICD-10-CM

## 2021-01-27 DIAGNOSIS — M22.42 CHONDROMALACIA PATELLAE OF LEFT KNEE: ICD-10-CM

## 2021-01-27 PROCEDURE — 20610 DRAIN/INJ JOINT/BURSA W/O US: CPT | Performed by: FAMILY MEDICINE

## 2021-01-27 NOTE — PROGRESS NOTES
Chief Complaint  Injections (LT KNEE #1 GELSYN-3)      Follow-up persistent left knee pain with pseudobuckling and crepitation with an amazing consideration of viscosupplementation left knee    History of Present Illness:  Prosper Smith is a 66 y.o. 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 Cruz Rowe who is being seen today for evaluation of ongoing pain to her left knee. She does have a history of significant patellofemoral arthropathy and knee osteoarthritis to her contralateral right knee treated with a one-time steroid injection by Dr. Franc Norman in 2015. She states that a week ago on about 1/4/2020 when she was kneeling down vacuuming some carpeted steps at home when she felt a pop primarily to the anterior portion of her left knee prompting today's visit. She has had a fairly consistent achy 5-6 out of 10 pain with her knee with pain with positional changes distance walking crossing her legs walking on uneven surfaces and going up and down stairs. She has taken some Excedrin ice and Aspercreme but this has not improved her symptoms. Initially she was having some night pain but this has improved. No active locking or catching. Her symptoms have not substantially improved over the last week prompting today's visit and she is being seen today for orthopedic and sports consultation with updated imaging. She is not having any instability or buckling symptoms. Jud Wilson was last seen in the office on 1/11/2021 and was diagnosed with significant bone-on-bone changes to the medial compartment of her left knee with patellofemoral arthropathy and knee synovitis. Initially she got a substantial improvement following her steroid injection but overall this is worn out and she would like to consider viscosupplementation. It is more of an achy pain at this point and not so much the sharp stabbing pain she was having originally.   She has tolerated her meloxicam and has been working on her home-based exercises. She would like to avoid arthroplasty as long as she can. She still has some difficulties with positional changes going up and down stairs and with sitting with her legs crossed. He is fairly stiff in the morning but loosens up as she walks. Denies locking catching or true instability symptoms. She does utilize her brace periodically. Medical History     Patient's medications, allergies, past medical, surgical, social and family histories were reviewed and updated as appropriate. Review of Systems  Pertinent items are noted in HPI  Review of systems reviewed from Patient History Form dated on 1/11/2021 and available in the patient's chart under the Media tab. Vital Signs  There were no vitals filed for this visit. General Exam:     Constitutional: Patient is adequately groomed with no evidence of malnutrition  DTRs: Deep tendon reflexes are intact  Mental Status: The patient is oriented to time, place and person. The patient's mood and affect are appropriate. Lymphatic: The lymphatic examination bilaterally reveals all areas to be without enlargement or induration. Vascular: Examination reveals no swelling or calf tenderness. Peripheral pulses are palpable and 2+. Neurological: The patient has good coordination. There is no weakness or sensory deficit. Knee Examination  Inspection: No high-grade deformity although she does have a trace knee joint effusion. She does have patellofemoral crepitation. Palpation: She is slightly less tender over the medial and lateral patellofemoral facet and does have now more mild pain reproduced with patellar grind testing and diffuse primarily anterior third medial joint line tenderness on the left with less lateral pain. Rang of Motion: She does have stiffness in the terminal 5 degrees of extension. Flexion to about 110 with patellofemoral crepitation.   Hamstrings mildly tight.    Strength: 4 out of 5 with flexion extension. Special Tests: Mild pain with patellar grind testing and pain with medial Belén's. No high-grade click. Negative lateral eBlén's. No instability. Screening hip testing reasonably benign. Skin: There are no rashes, ulcerations or lesions. Distal neurovascular exam is intact. Gait: Mild altalgia. Reflex symmetrically preserved    Additional Comments:     Additional Examinations:  Contralateral Exam: Examination of the right knee reveals intact skin. There is no focal tenderness. The patient demonstrates full painless range of motion with regards to flexion and extension. Strength is 5/5 thorough out all planes. Ligamentous stability is grossly intact. Right Lower Extremity: Examination of the right lower extremity does not show any tenderness, deformity or injury. Range of motion is unremarkable. There is no gross instability. There are no rashes, ulcerations or lesions. Strength and tone are normal.  Left Lower Extremity: Examination of the left lower extremity does not show any tenderness, deformity or injury. Range of motion is unremarkable. There is no gross instability. There are no rashes, ulcerations or lesions. Strength and tone are normal.      Diagnostic Test Findings: Left knee AP and PA weightbearing sunrise and lateral films were reviewed from 1/11/2021 and does show significant medial compartment narrowing. This is not quite bone-on-bone. She does have advanced bone-on-bone patellofemoral arthropathy on the left. Assessment : #1.  3+ week status post moderately improved aggravation significant medial compartment osteoarthritis with bone-on-bone patellofemoral arthropathy and the knee synovitis    Impression:  Encounter Diagnoses   Name Primary?     Primary osteoarthritis of left knee Yes    Chondromalacia patellae of left knee     Acute pain of left knee        Office Procedures:  Orders Placed This

## 2021-02-03 ENCOUNTER — OFFICE VISIT (OUTPATIENT)
Dept: ORTHOPEDIC SURGERY | Age: 76
End: 2021-02-03
Payer: MEDICARE

## 2021-02-03 VITALS — HEIGHT: 64 IN | WEIGHT: 159 LBS | BODY MASS INDEX: 27.14 KG/M2

## 2021-02-03 DIAGNOSIS — M22.42 CHONDROMALACIA PATELLAE OF LEFT KNEE: ICD-10-CM

## 2021-02-03 DIAGNOSIS — M17.12 PRIMARY OSTEOARTHRITIS OF LEFT KNEE: Primary | ICD-10-CM

## 2021-02-03 DIAGNOSIS — M25.562 ACUTE PAIN OF LEFT KNEE: ICD-10-CM

## 2021-02-03 PROCEDURE — 20610 DRAIN/INJ JOINT/BURSA W/O US: CPT | Performed by: FAMILY MEDICINE

## 2021-02-03 NOTE — PROGRESS NOTES
CC:  FU Knee Osteoarthritis with Viscosupplementation       HPI: History of Present Illness:  Adrianna Palma is a 66 y.o. 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 Abel Rowe who is being seen today for evaluation of ongoing pain to her left knee. She does have a history of significant patellofemoral arthropathy and knee osteoarthritis to her contralateral right knee treated with a one-time steroid injection by Dr. Rachel Bar in 2015. She states that a week ago on about 1/4/2020 when she was kneeling down vacuuming some carpeted steps at home when she felt a pop primarily to the anterior portion of her left knee prompting today's visit. She has had a fairly consistent achy 5-6 out of 10 pain with her knee with pain with positional changes distance walking crossing her legs walking on uneven surfaces and going up and down stairs. She has taken some Excedrin ice and Aspercreme but this has not improved her symptoms. Initially she was having some night pain but this has improved. No active locking or catching. Her symptoms have not substantially improved over the last week prompting today's visit and she is being seen today for orthopedic and sports consultation with updated imaging. She is not having any instability or buckling symptoms. Rolando Baldwin was last seen in the office on 1/11/2021 and was diagnosed with significant bone-on-bone changes to the medial compartment of her left knee with patellofemoral arthropathy and knee synovitis. Initially she got a substantial improvement following her steroid injection but overall this is worn out and she would like to consider viscosupplementation. It is more of an achy pain at this point and not so much the sharp stabbing pain she was having originally. She has tolerated her meloxicam and has been working on her home-based exercises. She would like to avoid arthroplasty as long as she can.   She still has some difficulties with positional changes going up and down stairs and with sitting with her legs crossed. He is fairly stiff in the morning but loosens up as she walks. Denies locking catching or true instability symptoms. She does utilize her brace periodically. Rhiannon Vivas was seen in the office on 1/27/2021 was started on GELSYN-3 injections to her left knee. She once again does have substantial bone-on-bone changes to the medial compartment of left knee with prominent patellofemoral arthropathy. Initially she was improving but over the weekend with prolonged standing and going up and down stairs, she did have very substantial pain at 9 out of 10. She has had some swelling as tolerated her meloxicam.  Denies locking or catching. With relative rest over the last couple of days her pain symptoms have improved and now being only about 5 out of 10. She has been working on her home-based exercises denies mechanical locking or catching. Here today for her second GELSYN-3 injection to her left knee. PE: no substantial change in exam.    Assessment:  Knee Osteoarthritis with viscosupplementation      PROCEDURE NOTE:    PRE-PROCEDURE DIAGNOSIS: DJD knee    POST-PROCEDURE DIAGNOSIS: DJD knee    Injection #2 of GELSYN-3. PROCEDURE:  With the patient's permission, her left knee was prepped in standard sterile fashion with Betadine and Alcohol and the prefilled 2cc injection of GELSYN-3 was injected intra-articularly into the left knee via a superolateral approach without difficulty. The patient tolerated this well without difficulty. A band-aid was applied. POST-PROCEDURE INSTRUCTIONS GIVEN TO PATIENT: The patient was advised to ice the knee for 15-20 minutes to relieve any injection site related pain. FOLLOW-UP: as directed.   Continue with her meloxicam 15 mg daily as well as her icing and exercise program.  We did have a long discussion regarding rehab and I really believe that this would help her to undergo formal therapy given the degree of her degenerative changes. She is now agreeable to this. She will continue with her bracing. Continue with her lateral heel wedges. She will be seen back next week to finish up her GELSYN-3 injection series. She would like to put off knee arthroplasty as long she can.

## 2021-02-08 ENCOUNTER — HOSPITAL ENCOUNTER (OUTPATIENT)
Dept: PHYSICAL THERAPY | Age: 76
Setting detail: THERAPIES SERIES
Discharge: HOME | End: 2021-02-08
Payer: MEDICARE

## 2021-02-08 PROCEDURE — 97110 THERAPEUTIC EXERCISES: CPT | Performed by: PHYSICAL THERAPIST

## 2021-02-08 PROCEDURE — 97161 PT EVAL LOW COMPLEX 20 MIN: CPT | Performed by: PHYSICAL THERAPIST

## 2021-02-08 NOTE — PLAN OF CARE
[facility], [address]  Phone: [phone]  Fax [phone]     Physical Therapy Certification    Dear Referring Practitioner: Ole Styles,    We had the pleasure of evaluating the following patient for physical therapy services at [address] [place] [address]. A summary of our findings can be found in the initial assessment below. This includes our plan of care. If you have any questions or concerns regarding these findings, please do not hesitate to contact me at the office phone number checked above. Thank you for the referral.       Physician Signature:_______________________________Date:__________________  By signing above (or electronic signature), therapists plan is approved by physician    Patient: Jing Olivera   : 1945   MRN: [id number]  Referring Physician: Referring Practitioner: Ole Styles      Evaluation Date: 2021      Medical Diagnosis Information:  Diagnosis: M25.562 (ICD-10-CM) - Acute pain of left knee, M17.12 (ICD-10-CM) - Primary osteoarthritis of left knee, M22.42 (ICD-10-CM) - Chondromalacia patellae of left knee   Treatment Diagnosis: M25.562 (ICD-10-CM) - Acute pain of left knee                                         Insurance information: PT Insurance Information: [facility] $35 cp, BMN, no auth, $4200 deduct       Precautions/ Contra-indications: none    C-SSRS Triggered by Intake questionnaire (Past 2 wk assessment):   [x] No, Questionnaire did not trigger screening.   [] Yes, Patient intake triggered further evaluation      [] C-SSRS Screening completed  [] PCP notified via Plan of Care  [] Emergency services notified     Latex Allergy:  [x]NO      []YES  Preferred Language for Healthcare:   [x]English       []other:    SUBJECTIVE: Patient stated complaint:Pt reports onset of L knee pain following kneeling to do some vacuuming 21.   This did not improve with OTC meds and topical creams. She initially saw Dr Vinny Mcclelland and had cortisone injection with minimal relief and so she switched to visco injections. She has already noticed some improvement following the second injection last week. Relevant Medical History:OA, hyperlipidemia, R knee A-scope, depression, anxiety, cervical radic, migraines  Functional Disability Index: LEFS 69%    Height: 5' 4\" Weight: 159  Pain Scale: 2-7/10  Easing factors: rest, injections, ice  Provocative factors: standing, walking, squatting, stairs, kneeling     Type: []Constant   [x]Intermittent  []Radiating []Localized []other:     Numbness/Tingling: denies    Occupation/School:retired    Living Status/Prior Level of Function: Independent with ADLs and IADLs, lives at home with , enjoys walking and deep water aerobics    OBJECTIVE:     ROM LEFT RIGHT   HIP Flex     HIP Abd     HIP Ext     HIP IR     HIP ER     Knee ext 0 0   Knee Flex 110 142   Ankle PF     Ankle DF     Ankle In     Ankle Ev     Strength  LEFT RIGHT   HIP Flexors 4+ 4   HIP Abductors 4- 4+   HIP Ext     Hip ER     Knee EXT (quad) 4- medial pain 4+   Knee Flex (HS) 4- 4   Ankle DF 5 5   Ankle PF     Ankle Inv     Ankle EV          Circumference  Mid apex  7 cm prox             Reflexes/Sensation: NT   []Dermatomes/Myotomes intact    []Reflexes equal and normal bilaterally   []Other:    Joint mobility: NT   []Normal    []Hypo   []Hyper    Palpation: tenderness medial knee, medial HS    Functional Mobility/Transfers: indep    Posture: WFL    Bandages/Dressings/Incisions: NA    Gait: (include devices/WB status) NT    Orthopedic Special Tests: SLB with moderate instability B 2-3\"                       [x] Patient history, allergies, meds reviewed. Medical chart reviewed. See intake form. Review Of Systems (ROS):  [x]Performed Review of systems (Integumentary, CardioPulmonary, Neurological) by intake and observation. Intake form has been scanned into medical record.  Patient has been instructed to contact their primary care physician regarding ROS issues if not already being addressed at this time. Co-morbidities/Complexities (which will affect course of rehabilitation):   []None           Arthritic conditions   []Rheumatoid arthritis (M05.9)  [x]Osteoarthritis (M19.91)   Cardiovascular conditions   []Hypertension (I10)  [x]Hyperlipidemia (E78.5)  []Angina pectoris (I20)  []Atherosclerosis (I70)   Musculoskeletal conditions   [x]Disc pathology   []Congenital spine pathologies   []Prior surgical intervention  []Osteoporosis (M81.8)  []Osteopenia (M85.8)   Endocrine conditions   []Hypothyroid (E03.9)  []Hyperthyroid Gastrointestinal conditions   []Constipation (W77.14)   Metabolic conditions   []Morbid obesity (E66.01)  []Diabetes type 1(E10.65) or 2 (E11.65)   []Neuropathy (G60.9)     Pulmonary conditions   []Asthma (J45)  []Coughing   []COPD (J44.9)   Psychological Disorders  [x]Anxiety (F41.9)  [x]Depression (F32.9)   []Other:   []Other:          Barriers to/and or personal factors that will affect rehab potential:              []Age  []Sex              []Motivation/Lack of Motivation                        []Co-Morbidities              []Cognitive Function, education/learning barriers              []Environmental, home barriers              []profession/work barriers  []past PT/medical experience  []other:  Justification:     Falls Risk Assessment (30 days):   [x] Falls Risk assessed and no intervention required.   [] Falls Risk assessed and Patient requires intervention due to being higher risk   TUG score (>12s at risk):     [] Falls education provided, including           ASSESSMENT:   Functional Impairments:     []Noted lumbar/proximal hip/LE joint hypomobility   [x]Decreased LE functional ROM   [x]Decreased core/proximal hip strength and neuromuscular control   [x]Decreased LE functional strength   [x]Reduced balance/proprioceptive control   []other:      Functional Activity Limitations (from functional questionnaire and intake)   []Reduced ability to tolerate prolonged functional positions   [x]Reduced ability or difficulty with changes of positions or transfers between positions   []Reduced ability to maintain good posture and demonstrate good body mechanics with sitting, bending, and lifting   []Reduced ability to sleep   [] Reduced ability or tolerance with driving and/or computer work   [x]Reduced ability to perform lifting, carrying tasks   [x]Reduced ability to squat   []Reduced ability to forward bend   [x]Reduced ability to ambulate prolonged functional periods/distances/surfaces   [x]Reduced ability to ascend/descend stairs   [x]Reduced ability to run, hop, cut or jump   []other:    Participation Restrictions   []Reduced participation in self care activities   [x]Reduced participation in home management activities   []Reduced participation in work activities   [x]Reduced participation in social activities. []Reduced participation in sport/recreation activities. Classification :    []Signs/symptoms consistent with post-surgical status including decreased ROM, strength and function.    []Signs/symptoms consistent with joint sprain/strain   []Signs/symptoms consistent with patella-femoral syndrome   [x]Signs/symptoms consistent with knee OA/hip OA   []Signs/symptoms consistent with internal derangement of knee/Hip   [x]Signs/symptoms consistent with functional hip weakness/NMR control      []Signs/symptoms consistent with tendinitis/tendinosis    []signs/symptoms consistent with pathology which may benefit from Dry needling      []other:      Prognosis/Rehab Potential:      []Excellent   [x]Good    []Fair   []Poor    Tolerance of evaluation/treatment:    []Excellent   [x]Good    []Fair   []Poor  Physical Therapy Evaluation Complexity Justification  [x] A history of present problem with:  [] no personal factors and/or comorbidities that impact the plan of care;  [x]1-2 personal factors and/or comorbidities that impact the plan of care  []3 personal factors and/or comorbidities that impact the plan of care  [x] An examination of body systems using standardized tests and measures addressing any of the following: body structures and functions (impairments), activity limitations, and/or participation restrictions;:  [x] a total of 1-2 or more elements   [] a total of 3 or more elements   [] a total of 4 or more elements   [x] A clinical presentation with:  [x] stable and/or uncomplicated characteristics   [] evolving clinical presentation with changing characteristics  [] unstable and unpredictable characteristics;   [x] Clinical decision making of [x] low, [] moderate, [] high complexity using standardized patient assessment instrument and/or measurable assessment of functional outcome. [x] EVAL (LOW) 54195 (typically 20 minutes face-to-face)  [] EVAL (MOD) 12739 (typically 30 minutes face-to-face)  [] EVAL (HIGH) 17749 (typically 45 minutes face-to-face)  [] RE-EVAL       PLAN:   Frequency/Duration:  1-2 days per week for 6 Weeks:  Interventions:  [x]  Therapeutic exercise including: strength training, ROM, for Lower extremity and core   [x]  NMR activation and proprioception for LE, Glutes and Core   [x]  Manual therapy as indicated for LE, Hip and spine to include: Dry Needling/IASTM, STM, PROM, Gr I-IV mobilizations, manipulation. [x] Modalities as needed that may include: thermal agents, E-stim, Biofeedback, US, iontophoresis as indicated  [x] Patient education on joint protection, postural re-education, activity modification, progression of HEP. HEP instruction: (see scanned forms)    GOALS:  Patient stated goal: avoid surgery  [] Progressing: [] Met: [] Not Met: [] Adjusted    Therapist goals for Patient:   Short Term Goals: To be achieved in: 2 weeks  1. Independent in HEP and progression per patient tolerance, in order to prevent re-injury.    [] Progressing: [] Met: [] Not Met: [] Adjusted  2. Patient will have a decrease in pain to facilitate improvement in movement, function, and ADLs as indicated by Functional Deficits. [] Progressing: [] Met: [] Not Met: [] Adjusted    Long Term Goals: To be achieved in: 6 weeks  1. Disability index score of 40% or less for the LEFS to assist with reaching prior level of function. [] Progressing: [] Met: [] Not Met: [] Adjusted  2. Patient will demonstrate increased AROM to 0-135 L knee to allow for proper joint functioning as indicated by patients Functional Deficits. [] Progressing: [] Met: [] Not Met: [] Adjusted  3. Patient will demonstrate an increase in Strength to 5/5 L knee and grossly 4+/5 B hips to allow for proper functional mobility as indicated by patients Functional Deficits. [] Progressing: [] Met: [] Not Met: [] Adjusted  4. Patient will return to reciprocal stairs functional activities without increased symptoms or restriction. [] Progressing: [] Met: [] Not Met: [] Adjusted  5. Pt will be able to resume deep water exercise classes without increased knee pain for maintaining healthy lifestyle. [] Progressing: [] Met: [] Not Met: [] Adjusted     Electronically signed by:  Lashanda Damico PT  Access Code: [email or web address]   URL: [email or web address]   Date: 02/08/2021   Prepared by: Lashanda Damico     Exercises   Sidelying Hip Abduction - 10 reps - 3 sets - 1-2x daily - 7x weekly   Supine Heel Slide with Strap - 10 reps - 1 sets - 10 seconds hold - 1-2x daily - 7x weekly   Supine Active Straight Leg Raise - 10 reps - 2-3 sets - 1-2x daily - 7x weekly   Supine Hamstring Stretch with Strap - 3 sets - 20-30 seconds hold - 1-2x daily - 7x weekly   Seated Long Arc Quad - 10 reps - 2-3 sets - 1-2x daily - 7x weekly   Wall Quarter Squat - 10 reps - 1-3 sets - 1-2x daily - 7x weekly

## 2021-02-08 NOTE — FLOWSHEET NOTE
[facility], [address]  Phone: [phone]  Fax [phone]    Physical Therapy Treatment Note/ Progress Report:           Date:  2021    Patient Name:  Jing Olivera    :  1945  MRN: [id number]  Restrictions/Precautions:    Medical/Treatment Diagnosis Information:  · Diagnosis: M25.562 (ICD-10-CM) - Acute pain of left knee, M17.12 (ICD-10-CM) - Primary osteoarthritis of left knee, M22.42 (ICD-10-CM) - Chondromalacia patellae of left knee  · Treatment Diagnosis: M25.562 (ICD-10-CM) - Acute pain of left knee  Insurance/Certification information:  PT Insurance Information: [facility] $35 cp, BMN, no auth, $4200 deduct  Physician Information:  Referring Practitioner: Ole Styles  Has the plan of care been signed (Y/N):        []  Yes  [x]  No     Date of Patient follow up with Physician: 2/10/21      Is this a Progress Report:     []  Yes  [x]  No        If Yes:  Date Range for reporting period:  Beginnin21  Ending:     Progress report will be due (10 Rx or 30 days whichever is less): 87       Recertification will be due (POC Duration  / 90 days whichever is less): 6 weeks      Visit # Insurance Allowable Auth Required   In-person 1 BMN []  Yes [x]  No    Telehealth   []  Yes []  No    Total          Functional Scale: LEFS 69%    Date assessed:  21      Therapy Diagnosis/Practice Pattern:D, conservative      Number of Comorbidities:  []0     []1-2    [x]3+    Latex Allergy:  [x]NO      []YES  Preferred Language for Healthcare:   [x]English       []other:      Pain level:  2-7/10     SUBJECTIVE:  See eval    OBJECTIVE: See eval   Observation:    Test measurements:      RESTRICTIONS/PRECAUTIONS: none    Exercises/Interventions:     Therapeutic Ex (97691) Sets/sec/reps Notes/CUES   SL hip abd x10 HEP   Supine heel slide w/ strap 10x10\" HEP   SLR x10 HEP   Supine HS S 2x30\" HEP   LAQ x10 HEP Wall mini squat x10 HEP                  Pt ed: eval findings, POC, HEP, activity mod, icing, foot wear with walking x8'    Manual Intervention (35793)                                   NMR re-education (17984)  CUES NEEDED                                                Therapeutic Activity (69343)                                         Therapeutic Exercise and NMR EXR  [x] (23391) Provided verbal/tactile cueing for activities related to strengthening, flexibility, endurance, ROM for improvements in LE, proximal hip, and core control with self care, mobility, lifting, ambulation.  [] (01431) Provided verbal/tactile cueing for activities related to improving balance, coordination, kinesthetic sense, posture, motor skill, proprioception  to assist with LE, proximal hip, and core control in self care, mobility, lifting, ambulation and eccentric single leg control.      NMR and Therapeutic Activities:    [] (71828 or 72586) Provided verbal/tactile cueing for activities related to improving balance, coordination, kinesthetic sense, posture, motor skill, proprioception and motor activation to allow for proper function of core, proximal hip and LE with self care and ADLs  [] (37528) Gait Re-education- Provided training and instruction to the patient for proper LE, core and proximal hip recruitment and positioning and eccentric body weight control with ambulation re-education including up and down stairs     Home Exercise Program:    [x] (28862) Reviewed/Progressed HEP activities related to strengthening, flexibility, endurance, ROM of core, proximal hip and LE for functional self-care, mobility, lifting and ambulation/stair navigation   [] (12267)Reviewed/Progressed HEP activities related to improving balance, coordination, kinesthetic sense, posture, motor skill, proprioception of core, proximal hip and LE for self care, mobility, lifting, and ambulation/stair navigation      Manual Treatments:  PROM / STM / Oscillations-Mobs:  G-I, II, III, IV (PA's, Inf., Post.)  [x] (19596) Provided manual therapy to mobilize LE, proximal hip and/or LS spine soft tissue/joints for the purpose of modulating pain, promoting relaxation,  increasing ROM, reducing/eliminating soft tissue swelling/inflammation/restriction, improving soft tissue extensibility and allowing for proper ROM for normal function with self care, mobility, lifting and ambulation. Modalities:  CP x10'   [] GAME READY (VASO)- for significant edema, swelling, pain control. Charges:  Timed Code Treatment Minutes: 25   Total Treatment Minutes: 54     [facility] time in/time out:   (and requires time in and out for each CPT code)    [x] EVAL (LOW) 19640   [] EVAL (MOD) 16157  [] EVAL (HIGH) 12430   [] RE-EVAL     [x] RQ(12526) x 2    [] IONTO  [] NMR (11166) x     [] VASO  [] Manual (30265) x      [] Other:  [] TA x      [] Mech Traction (89858)  [] ES(attended) (15271)      [] ES (un) (23445):       GOALS: Patient stated goal: avoid surgery  [] Progressing: [] Met: [] Not Met: [] Adjusted    Therapist goals for Patient:   Short Term Goals: To be achieved in: 2 weeks  1. Independent in HEP and progression per patient tolerance, in order to prevent re-injury. [] Progressing: [] Met: [] Not Met: [] Adjusted  2. Patient will have a decrease in pain to facilitate improvement in movement, function, and ADLs as indicated by Functional Deficits. [] Progressing: [] Met: [] Not Met: [] Adjusted    Long Term Goals: To be achieved in: 6 weeks  1. Disability index score of 40% or less for the LEFS to assist with reaching prior level of function. [] Progressing: [] Met: [] Not Met: [] Adjusted  2. Patient will demonstrate increased AROM to 0-135 L knee to allow for proper joint functioning as indicated by patients Functional Deficits. [] Progressing: [] Met: [] Not Met: [] Adjusted  3.  Patient will demonstrate an increase in Strength to 5/5 L knee and grossly 4+/5 B hips to

## 2021-02-10 ENCOUNTER — OFFICE VISIT (OUTPATIENT)
Dept: ORTHOPEDIC SURGERY | Age: 76
End: 2021-02-10
Payer: MEDICARE

## 2021-02-10 VITALS — BODY MASS INDEX: 27.14 KG/M2 | WEIGHT: 159 LBS | HEIGHT: 64 IN

## 2021-02-10 DIAGNOSIS — M25.562 ACUTE PAIN OF LEFT KNEE: ICD-10-CM

## 2021-02-10 DIAGNOSIS — M17.12 PRIMARY OSTEOARTHRITIS OF LEFT KNEE: Primary | ICD-10-CM

## 2021-02-10 DIAGNOSIS — M22.42 CHONDROMALACIA PATELLAE OF LEFT KNEE: ICD-10-CM

## 2021-02-10 DIAGNOSIS — M65.9 SYNOVITIS OF LEFT KNEE: ICD-10-CM

## 2021-02-10 PROCEDURE — 20610 DRAIN/INJ JOINT/BURSA W/O US: CPT | Performed by: FAMILY MEDICINE

## 2021-02-10 NOTE — PROGRESS NOTES
CC:  FU Knee Osteoarthritis with Viscosupplementation       HPI: History of Present Illness:  Jing Olivera is a 66 y.o. 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 Richard Rowe who is being seen today for evaluation of ongoing pain to her left knee. She does have a history of significant patellofemoral arthropathy and knee osteoarthritis to her contralateral right knee treated with a one-time steroid injection by Dr. Zuly Zurita in 2015. She states that a week ago on about 1/4/2020 when she was kneeling down vacuuming some carpeted steps at home when she felt a pop primarily to the anterior portion of her left knee prompting today's visit. She has had a fairly consistent achy 5-6 out of 10 pain with her knee with pain with positional changes distance walking crossing her legs walking on uneven surfaces and going up and down stairs. She has taken some Excedrin ice and Aspercreme but this has not improved her symptoms. Initially she was having some night pain but this has improved. No active locking or catching. Her symptoms have not substantially improved over the last week prompting today's visit and she is being seen today for orthopedic and sports consultation with updated imaging. She is not having any instability or buckling symptoms. Alina Crook was last seen in the office on 1/11/2021 and was diagnosed with significant bone-on-bone changes to the medial compartment of her left knee with patellofemoral arthropathy and knee synovitis. Initially she got a substantial improvement following her steroid injection but overall this is worn out and she would like to consider viscosupplementation. It is more of an achy pain at this point and not so much the sharp stabbing pain she was having originally. She has tolerated her meloxicam and has been working on her home-based exercises. She would like to avoid arthroplasty as long as she can.   She still has permission, her left knee was prepped in standard sterile fashion with Betadine and Alcohol and the prefilled 2cc injection of GELSYN-3 was injected intra-articularly into the left knee via a superolateral approach without difficulty. The patient tolerated this well without difficulty. A band-aid was applied. POST-PROCEDURE INSTRUCTIONS GIVEN TO PATIENT: The patient was advised to ice the knee for 15-20 minutes to relieve any injection site related pain. FOLLOW-UP: as directed. Continue with her meloxicam 15 mg daily as well as her icing and exercise program.  We did have a long discussion regarding rehab and I really believe that this would help her to undergo formal therapy given the degree of her degenerative changes. She is now agreeable and did not attend physical therapy and wishes to try these on her own at home and return to therapy if she is doing poorly but she is clinically doing well at this time. She may continue with her bracing. As this is her first attempt at viscosupplementation, we would like to see her back in 3 months and she is aware that she can have viscosupplementation at 6-month intervals as long as it is helpful. She will contact us in the interim with questions or concerns.

## 2021-02-25 NOTE — PROGRESS NOTES
[facility]) Dermatology  MD Saundra [address]  1945    66 y.o. female     Date of Visit: 3/1/2021    Last Visit: 1.5yrs    Chief Complaint: Skin check    History of Present Illness:  1. Here for skin check. Hx NMSC - SCC dorsum nose s/p Mohs 2012, nBCC L upper back s/p excision 6/2019. No new lesions of concern.   -Avoids sun. Wears SPF 30 sunscreen regularly. 2. History of actinic keratoses s/p cryotherapy. 3. Complains of intermittent pruritus and flaking around nose. Years ago had experienced similar symptoms of eyebrows     Review of Systems:  Constitutional: Reports general sense of well-being. Skin: No interval severe sunburns. Allergies: Reviewed and updated. Past Medical History, Surgical History, Medications and Allergies reviewed. Past Medical History:   Diagnosis Date    Anxiety     Arthritis     Cancer ([address]     skin    Cervical radiculopathy 5/2012    LT. c-5 c-6 MRI    Depression     Dupuytren's contracture     Fractures     Hyperlipidemia     Hyperlipidemia     IBS (irritable bowel syndrome)     Insomnia     Migraine     past hx    Reflux     Shingles 11/2013    Left Face.     SOB (shortness of breath) 03/23/2017    GXT  Normal     Past Surgical History:   Procedure Laterality Date    BRONCHOSCOPY  9/11/14    Right Middle Lobe Volume Loss:Negative Pathology    CATARACT REMOVAL      bilateral    CHOLECYSTECTOMY  07/29/14    DaVinci assisted laparoscopic cholecystetomy    COLONOSCOPY  03/2003    negative    DILATION AND CURETTAGE OF UTERUS      x 2    EYE SURGERY Bilateral     catarct    HAND SURGERY Right 10/19/2017    dupuytren's disease excision including middle and ring finger    KNEE ARTHROSCOPY      MOHS SURGERY      squamous cell    SKIN BIOPSY  11/19/2012    squamous cell nose    TONSILLECTOMY AND ADENOIDECTOMY      UPPER GASTROINTESTINAL ENDOSCOPY N/A 10/27/2020    EGD DIAGNOSTIC ONLY performed by Abena Ayoub MD at [facility]   Allergen Reactions    Amoxicillin Rash    Entex Pse [Pseudoephedrine-Guaifenesin]      gittery       Outpatient Medications Marked as Taking for the 3/1/21 encounter (Office Visit) with Damon Jenkins MD   Medication Sig Dispense Refill    meloxicam (MOBIC) 15 MG tablet TAKE 1 TABLET BY MOUTH DAILY      FLUoxetine (PROZAC) 20 MG capsule TAKE ONE CAPSULE BY MOUTH EVERY DAY 90 capsule 1    pravastatin (PRAVACHOL) 20 MG tablet TAKE 1 TABLET BY MOUTH EVERY EVENING FOR HIGH CHOLESTEROL 90 tablet 1    buPROPion (WELLBUTRIN SR) 150 MG extended release tablet TAKE 1 TABLET BY MOUTH EVERY DAY FOR DEPRESSION 90 tablet 1    traZODone (DESYREL) 50 MG tablet TAKE ONE AND ONE-HALF TABLETS BY MOUTH EVERY NIGHT AT BEDTIME (Patient taking differently: TAKE ONE AND ONE-HALF TABLETS BY MOUTH EVERY NIGHT AT BEDTIME  Pt taking one tablet) 135 tablet 3    Cetirizine HCl (ZYRTEC PO) Take 1 tablet by mouth daily. Physical Examination     The following were examined and determined to be normal: Psych/Neuro, Scalp/hair, Conjunctivae/eyelids, Gums/teeth/lips, Neck, Abdomen, Breast/axilla/chest., RUE, LUE, LLE, Nails/digits and Genitalia/groin/buttocks. The following were examined and determined to be abnormal: Head/face and Back RLE    -General: Well-appearing, NAD  1. Nasal dorsum, L upper medial back - scars clear   2. R 1 and L 1 temples, nasal bridge 1, R 1 and L 1 nasal sidewall, R lower leg 1 - ill-defined irregularly-shaped roughly-scaling thin pink macule(s)/papule(s)   3. R>L NL folds - bkgd mild erythema, minimal scale    Assessment and Plan     1.  History of NMSC - clear today   -Reviewed sun protective behavior -- sun avoidance during the peak hours of the day, sun-protective clothing (including hat and sunglasses), sunscreen use (water resistant, broad spectrum, SPF at least 30, need for reapplication every 2 to 3 hours), avoidance of tanning beds  -Full skin exam in 1yr 2. Actinic keratosis(es)  -Edu re: relationship with chronic cumulative sun exposure, low premalignant potential.   -6 lesion(s) treated w/ liquid nitrogen x 2 cycles - R 1 and L 1 temples, nasal bridge 1, R 1 and L 1 nasal sidewall, R lower leg 1 . Edu re: risk of blister formation, discomfort, scar, hypopigmentation. Discussed wound care w/ vaseline or Aquaphor. 3. Seborrheic dermatitis  -Hydrocortisone 2.5% cream bid to affected areas of face prn flares for several days until erythema and scaling have subsided.  Edu re: sparing use, atrophy, striae, hypopigmentation, telangiectasias.   -Informed pt that if flares are frequent, okay to call me for ketoconazole crm as maintenance

## 2021-03-01 ENCOUNTER — OFFICE VISIT (OUTPATIENT)
Dept: DERMATOLOGY | Age: 76
End: 2021-03-01
Payer: MEDICARE

## 2021-03-01 VITALS — TEMPERATURE: 98 F

## 2021-03-01 DIAGNOSIS — L57.0 ACTINIC KERATOSES: Primary | ICD-10-CM

## 2021-03-01 DIAGNOSIS — Z12.83 SCREENING EXAM FOR SKIN CANCER: ICD-10-CM

## 2021-03-01 DIAGNOSIS — Z85.828 HISTORY OF NONMELANOMA SKIN CANCER: ICD-10-CM

## 2021-03-01 DIAGNOSIS — L21.9 SEBORRHEIC DERMATITIS: ICD-10-CM

## 2021-03-01 PROCEDURE — G8417 CALC BMI ABV UP PARAM F/U: HCPCS | Performed by: DERMATOLOGY

## 2021-03-01 PROCEDURE — G8427 DOCREV CUR MEDS BY ELIG CLIN: HCPCS | Performed by: DERMATOLOGY

## 2021-03-01 PROCEDURE — 99214 OFFICE O/P EST MOD 30 MIN: CPT | Performed by: DERMATOLOGY

## 2021-03-01 PROCEDURE — G8484 FLU IMMUNIZE NO ADMIN: HCPCS | Performed by: DERMATOLOGY

## 2021-03-01 PROCEDURE — 1090F PRES/ABSN URINE INCON ASSESS: CPT | Performed by: DERMATOLOGY

## 2021-03-01 PROCEDURE — 17003 DESTRUCT PREMALG LES 2-14: CPT | Performed by: DERMATOLOGY

## 2021-03-01 PROCEDURE — 4040F PNEUMOC VAC/ADMIN/RCVD: CPT | Performed by: DERMATOLOGY

## 2021-03-01 PROCEDURE — 17000 DESTRUCT PREMALG LESION: CPT | Performed by: DERMATOLOGY

## 2021-03-01 PROCEDURE — G8399 PT W/DXA RESULTS DOCUMENT: HCPCS | Performed by: DERMATOLOGY

## 2021-03-01 PROCEDURE — 1123F ACP DISCUSS/DSCN MKR DOCD: CPT | Performed by: DERMATOLOGY

## 2021-03-01 PROCEDURE — 1036F TOBACCO NON-USER: CPT | Performed by: DERMATOLOGY

## 2021-03-01 RX ORDER — MELOXICAM 15 MG/1
TABLET ORAL
COMMUNITY
Start: 2021-02-09 | End: 2021-05-10

## 2021-04-29 DIAGNOSIS — E78.5 HYPERLIPIDEMIA: ICD-10-CM

## 2021-04-29 RX ORDER — PRAVASTATIN SODIUM 20 MG
TABLET ORAL
Qty: 90 TABLET | Refills: 1 | Status: SHIPPED | OUTPATIENT
Start: 2021-04-29 | End: 2021-08-12

## 2021-04-29 NOTE — TELEPHONE ENCOUNTER
Refill request for pravastatin medication.      Name of Charmaine      Last visit - 1/19/2021     Pending visit - 7/20/2021    Last refill -10/21/2020  1 refill

## 2021-05-10 RX ORDER — MELOXICAM 15 MG/1
TABLET ORAL
Qty: 30 TABLET | Refills: 3 | Status: SHIPPED | OUTPATIENT
Start: 2021-05-10 | End: 2021-07-07

## 2021-05-10 RX ORDER — BUPROPION HYDROCHLORIDE 150 MG/1
TABLET, EXTENDED RELEASE ORAL
Qty: 90 TABLET | Refills: 1 | Status: SHIPPED | OUTPATIENT
Start: 2021-05-10 | End: 2021-11-10

## 2021-05-10 NOTE — TELEPHONE ENCOUNTER
Refill request for bupropion medication.      [facility]      Last visit - 1/19/21     Pending visit - 7/20/21    Last refill -10/21/20

## 2021-05-19 ENCOUNTER — OFFICE VISIT (OUTPATIENT)
Dept: ORTHOPEDIC SURGERY | Age: 76
End: 2021-05-19
Payer: MEDICARE

## 2021-05-19 VITALS — WEIGHT: 150 LBS | BODY MASS INDEX: 25.61 KG/M2 | HEIGHT: 64 IN

## 2021-05-19 DIAGNOSIS — M19.072 PRIMARY OSTEOARTHRITIS OF LEFT ANKLE: ICD-10-CM

## 2021-05-19 DIAGNOSIS — M22.42 CHONDROMALACIA PATELLAE OF LEFT KNEE: ICD-10-CM

## 2021-05-19 DIAGNOSIS — M25.562 ACUTE PAIN OF LEFT KNEE: ICD-10-CM

## 2021-05-19 DIAGNOSIS — M25.571 ACUTE RIGHT ANKLE PAIN: Primary | ICD-10-CM

## 2021-05-19 DIAGNOSIS — M17.12 PRIMARY OSTEOARTHRITIS OF LEFT KNEE: ICD-10-CM

## 2021-05-19 PROCEDURE — L1902 AFO ANKLE GAUNTLET PRE OTS: HCPCS | Performed by: FAMILY MEDICINE

## 2021-05-19 PROCEDURE — G8427 DOCREV CUR MEDS BY ELIG CLIN: HCPCS | Performed by: FAMILY MEDICINE

## 2021-05-19 PROCEDURE — 1123F ACP DISCUSS/DSCN MKR DOCD: CPT | Performed by: FAMILY MEDICINE

## 2021-05-19 PROCEDURE — 1036F TOBACCO NON-USER: CPT | Performed by: FAMILY MEDICINE

## 2021-05-19 PROCEDURE — 99214 OFFICE O/P EST MOD 30 MIN: CPT | Performed by: FAMILY MEDICINE

## 2021-05-19 PROCEDURE — 4040F PNEUMOC VAC/ADMIN/RCVD: CPT | Performed by: FAMILY MEDICINE

## 2021-05-19 PROCEDURE — G8399 PT W/DXA RESULTS DOCUMENT: HCPCS | Performed by: FAMILY MEDICINE

## 2021-05-19 PROCEDURE — G8417 CALC BMI ABV UP PARAM F/U: HCPCS | Performed by: FAMILY MEDICINE

## 2021-05-19 PROCEDURE — 1090F PRES/ABSN URINE INCON ASSESS: CPT | Performed by: FAMILY MEDICINE

## 2021-05-19 RX ORDER — METHYLPREDNISOLONE 4 MG/1
TABLET ORAL
Qty: 21 KIT | Refills: 0 | Status: SHIPPED | OUTPATIENT
Start: 2021-05-19 | End: 2021-06-09

## 2021-05-20 NOTE — PROGRESS NOTES
Chief Complaint  Knee Pain (CK L KNEE) and Ankle Pain      Follow-up  left knee pain with with underlying significant left knee osteoarthritis status post completion of GELSYN-3 2/10/2021 with newer onset right anterior lateral ankle pain    History of Present Illness:  Manisha Baires is a 66 y.o. 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 Luh Rowe who is being seen today for evaluation of ongoing pain to her left knee. She does have a history of significant patellofemoral arthropathy and knee osteoarthritis to her contralateral right knee treated with a one-time steroid injection by Dr. Jeovanny Wayne in 2015. She states that a week ago on about 1/4/2020 when she was kneeling down vacuuming some carpeted steps at home when she felt a pop primarily to the anterior portion of her left knee prompting today's visit. She has had a fairly consistent achy 5-6 out of 10 pain with her knee with pain with positional changes distance walking crossing her legs walking on uneven surfaces and going up and down stairs. She has taken some Excedrin ice and Aspercreme but this has not improved her symptoms. Initially she was having some night pain but this has improved. No active locking or catching. Her symptoms have not substantially improved over the last week prompting today's visit and she is being seen today for orthopedic and sports consultation with updated imaging. She is not having any instability or buckling symptoms. Alvenia Apley was last seen in the office on 1/11/2021 and was diagnosed with significant bone-on-bone changes to the medial compartment of her left knee with patellofemoral arthropathy and knee synovitis. Initially she got a substantial improvement following her steroid injection but overall this is worn out and she would like to consider viscosupplementation.   It is more of an achy pain at this point and not so much the sharp stabbing pain she was having originally. She has tolerated her meloxicam and has been working on her home-based exercises. She would like to avoid arthroplasty as long as she can. She still has some difficulties with positional changes going up and down stairs and with sitting with her legs crossed. He is fairly stiff in the morning but loosens up as she walks. Denies locking catching or true instability symptoms. She does utilize her brace periodically. Duncan Lucas was last seen in the office on 2/10/2021 when we completed GELSYN-3 injections to her left knee. She presents back today stating that her knee symptoms are doing outstanding. She is at least 90% improved and is having much less pain with distance walking and prolonged standing. She still does stairs one at a time but states that her current symptoms are very tolerable currently. She has been working her home-based exercise program and is continue with her meloxicam.  Her greater concern today is of increasing pain about the anterior lateral aspect of her right ankle. She apparently was fishing down at [facility] in [facility] this past weekend on 5/15/2021 standing on a rocking bass boat and since that time she is having worsening pain about the anterior lateral aspect of her ankle which is quite substantial at 6-7 out of 10 with weightbearing. She has had some mild swelling. She has no recollection of inverting her ankle getting onto the boat. She is having rest pain at 3 out of 10 and up to about 6-7 out of 10 pain with weightbearing and pivoting in particular. She is concerned that her ankle pain will cause worsening pain on her contralateral left knee. She reports no sensations of loose bodies locking or catching and does have stiffness. She is being seen today for orthopedic and sports consultation for review of her left knee arthritis and evaluation of a new onset of symptoms to her right ankle.       Pain Assessment  Location of Pain: Knee  Location Modifiers: Left  Severity of Pain: 2  Quality of Pain: Dull  Duration of Pain: A few hours  Frequency of Pain: Intermittent  Aggravating Factors: Stairs  Limiting Behavior: Some  Relieving Factors: Rest  Result of Injury: No  Work-Related Injury: No  Are there other pain locations you wish to document?: No         Medical History     Patient's medications, allergies, past medical, surgical, social and family histories were reviewed and updated as appropriate. Review of Systems  Pertinent items are noted in HPI  Review of systems reviewed from Patient History Form dated on 1/11/2021 and available in the patient's chart under the Media tab. Vital Signs  There were no vitals filed for this visit. General Exam:     Constitutional: Patient is adequately groomed with no evidence of malnutrition  DTRs: Deep tendon reflexes are intact  Mental Status: The patient is oriented to time, place and person. The patient's mood and affect are appropriate. Lymphatic: The lymphatic examination bilaterally reveals all areas to be without enlargement or induration. Vascular: Examination reveals no swelling or calf tenderness. Peripheral pulses are palpable and 2+. Neurological: The patient has good coordination. There is no weakness or sensory deficit. Knee Examination  Inspection: No high-grade deformity although she does have a trace knee joint effusion. She does have patellofemoral crepitation. Palpation: She is much less tender over the medial and lateral patellofemoral facet and does have now more mild pain reproduced with patellar grind testing and diffuse primarily anterior third medial joint line tenderness on the left with less lateral pain. Rang of Motion: She does have less prominent stiffness in the terminal 5 degrees of extension. Flexion to about 110 with patellofemoral crepitation. Hamstrings mildly tight. Strength: 4 out of 5 with flexion extension.     Special Tests: No substantial pain with patellar grind testing and pain with medial Belén's. No high-grade click. Negative lateral Belén's. No instability. Screening hip testing reasonably benign. Skin: There are no rashes, ulcerations or lesions. Distal neurovascular exam is intact. Gait: Mild altalgia primarily secondary to right ankle pain. .    Reflex symmetrically preserved    Additional Comments: Right ankle evaluation does reveal trace to 1+ swelling bilaterally with trace ankle joint effusion. She does have some tenderness to the anterior lateral talar dome with mild tenderness over the ATFL. No high-grade syndesmotic or fibular metaphyseal tenderness. Mild discomfort with palpation of the posterior tibialis tendon and peroneal's. No evidence of instability. 25% reduction in ankle motion with tightness to her Achilles and weakness 4-5 with eversion and dorsiflexion. Effectively negative anterior drawer. Negative talar tilt and Deng's testing. Contralateral ankle exam is benign. Additional Examinations:  Contralateral Exam: Examination of the right knee reveals intact skin. There is no focal tenderness. The patient demonstrates full painless range of motion with regards to flexion and extension. Strength is 5/5 thorough out all planes. Ligamentous stability is grossly intact. Right Lower Extremity: Examination of the right lower extremity does not show any tenderness, deformity or injury. Range of motion is unremarkable. There is no gross instability. There are no rashes, ulcerations or lesions. Strength and tone are normal.  Left Lower Extremity: Examination of the left lower extremity does not show any tenderness, deformity or injury. Range of motion is unremarkable. There is no gross instability. There are no rashes, ulcerations or lesions.   Strength and tone are normal.      Diagnostic Test Findings: Left knee AP and PA weightbearing sunrise and lateral films were reviewed from 1/11/2021 and does show significant medial compartment narrowing. This is not quite bone-on-bone. She does have advanced bone-on-bone patellofemoral arthropathy on the left. Right ankle AP lateral mortise films were obtained today in the office which does show fairly prominent tibiotalar arthritic changes to the anterior lateral portion of the tibial talar joint. No evidence of acute fracture. Assessment : #1.  4 months status post Tone John improved aggravation significant medial compartment osteoarthritis with bone-on-bone patellofemoral arthropathy improved knee synovitis status post completion of left knee GELSYN-3 injections 2/10/2021. #2.  5 days status post aggravation right ankle significant osteoarthritis with ankle synovitis and ankle pain    Impression:  Encounter Diagnoses   Name Primary?  Acute right ankle pain Yes    Primary osteoarthritis of left knee     Chondromalacia patellae of left knee     Acute pain of left knee     Primary osteoarthritis of left ankle        Office Procedures:  Orders Placed This Encounter   Procedures    XR ANKLE RIGHT (MIN 3 VIEWS)     Standing Status:   Future     Number of Occurrences:   1     Standing Expiration Date:   5/19/2022     Order Specific Question:   Reason for exam:     Answer:   pain    Ambulatory referral to Physical Therapy     Referral Priority:   Routine     Referral Type:   Eval and Treat     Referral Reason:   Specialty Services Required     Requested Specialty:   Physical Therapy     Number of Visits Requested:   1    Jermaine Ankle Brace     Patient was prescribed a Jermaine Ankle Brace. The right ankle will require stabilization / immobilization from this semi-rigid / rigid orthosis to improve their function. The orthosis will assist in protecting the affected area, provide functional support and facilitate healing. Patient was instructed to progress ambulation weight bearing as tolerated in the device.      The patient was educated and fit by a healthcare professional with expert knowledge and specialization in brace application while under the direct supervision of the treating physician. Verbal and written instructions for the use of and application of this item were provided. They were instructed to contact the office immediately should the brace result in increased pain, decreased sensation, increased swelling or worsening of the condition. Treatment Plan:  Treatment options were discussed with Manjula Maxx. We did once again review her plain films and exam findings. Does have quite prominent anterior medial compartment osteoarthritis. Clinic at this time she states her knee symptoms are doing much better she is 90+ percent improved following completion of her GELSYN-3 injections on 2/10/2021. She was encouraged to continue with her exercise program with periodic use of her knee brace. She is very pleased with her knee symptoms. Her more pressing complaint is of increasing anterior lateral right ankle pain after going on a fishing trip with her  on 5/14/2021. I suspect her dealing with an aggravation of underlying significant right ankle osteoarthritis particular to the lateral portion of the tibial talar joint. There is no recalled history of inversion. We did place her in a lace up ankle brace and placed on a Medrol Dosepak to be followed by resuming her meloxicam 15 mg daily we will start her into physical therapy for her right ankle. Icing and activity modification was discussed. We will see her back in about 2 to 3 weeks for follow-up. We may consider ankle imaging or intra-articular tibiotalar injection if she remains symptomatic. She will contact us in the interim with questions or concerns. This dictation was performed with a verbal recognition program (DRAGON) and it was checked for errors. It is possible that there are still dictated errors within this office note.  If so, please bring any errors to my attention for an addendum. All efforts were made to ensure that this office note is accurate.

## 2021-06-09 ENCOUNTER — OFFICE VISIT (OUTPATIENT)
Dept: ORTHOPEDIC SURGERY | Age: 76
End: 2021-06-09
Payer: MEDICARE

## 2021-06-09 VITALS — BODY MASS INDEX: 25.61 KG/M2 | WEIGHT: 150 LBS | HEIGHT: 64 IN

## 2021-06-09 DIAGNOSIS — M17.12 PRIMARY OSTEOARTHRITIS OF LEFT KNEE: Primary | ICD-10-CM

## 2021-06-09 DIAGNOSIS — M76.71 PERONEAL TENDINITIS OF RIGHT LOWER EXTREMITY: ICD-10-CM

## 2021-06-09 DIAGNOSIS — M25.571 ACUTE RIGHT ANKLE PAIN: ICD-10-CM

## 2021-06-09 PROCEDURE — 1036F TOBACCO NON-USER: CPT | Performed by: FAMILY MEDICINE

## 2021-06-09 PROCEDURE — G8427 DOCREV CUR MEDS BY ELIG CLIN: HCPCS | Performed by: FAMILY MEDICINE

## 2021-06-09 PROCEDURE — G8417 CALC BMI ABV UP PARAM F/U: HCPCS | Performed by: FAMILY MEDICINE

## 2021-06-09 PROCEDURE — 4040F PNEUMOC VAC/ADMIN/RCVD: CPT | Performed by: FAMILY MEDICINE

## 2021-06-09 PROCEDURE — G8399 PT W/DXA RESULTS DOCUMENT: HCPCS | Performed by: FAMILY MEDICINE

## 2021-06-09 PROCEDURE — 20550 NJX 1 TENDON SHEATH/LIGAMENT: CPT | Performed by: FAMILY MEDICINE

## 2021-06-09 PROCEDURE — 1090F PRES/ABSN URINE INCON ASSESS: CPT | Performed by: FAMILY MEDICINE

## 2021-06-09 PROCEDURE — 99214 OFFICE O/P EST MOD 30 MIN: CPT | Performed by: FAMILY MEDICINE

## 2021-06-09 PROCEDURE — 1123F ACP DISCUSS/DSCN MKR DOCD: CPT | Performed by: FAMILY MEDICINE

## 2021-06-09 RX ORDER — BUPIVACAINE HYDROCHLORIDE 2.5 MG/ML
1 INJECTION, SOLUTION INFILTRATION; PERINEURAL ONCE
Status: COMPLETED | OUTPATIENT
Start: 2021-06-09 | End: 2021-06-09

## 2021-06-09 RX ORDER — BETAMETHASONE SODIUM PHOSPHATE AND BETAMETHASONE ACETATE 3; 3 MG/ML; MG/ML
6 INJECTION, SUSPENSION INTRA-ARTICULAR; INTRALESIONAL; INTRAMUSCULAR; SOFT TISSUE ONCE
Status: COMPLETED | OUTPATIENT
Start: 2021-06-09 | End: 2021-06-09

## 2021-06-09 RX ORDER — LIDOCAINE HYDROCHLORIDE 10 MG/ML
1 INJECTION, SOLUTION INFILTRATION; PERINEURAL ONCE
Status: COMPLETED | OUTPATIENT
Start: 2021-06-09 | End: 2021-06-09

## 2021-06-09 RX ADMIN — BETAMETHASONE SODIUM PHOSPHATE AND BETAMETHASONE ACETATE 6 MG: 3; 3 INJECTION, SUSPENSION INTRA-ARTICULAR; INTRALESIONAL; INTRAMUSCULAR; SOFT TISSUE at 14:41

## 2021-06-09 RX ADMIN — BUPIVACAINE HYDROCHLORIDE 2.5 MG: 2.5 INJECTION, SOLUTION INFILTRATION; PERINEURAL at 14:42

## 2021-06-09 RX ADMIN — LIDOCAINE HYDROCHLORIDE 1 ML: 10 INJECTION, SOLUTION INFILTRATION; PERINEURAL at 14:42

## 2021-06-09 NOTE — PROGRESS NOTES
Chief Complaint  Ankle Pain (CK RIGHT ANKLE)      Follow-up  left knee pain with with underlying significant left knee osteoarthritis status post completion of GELSYN-3 2/10/2021 with follow-up right ankle tibiotalar osteoarthritis with synovitis    History of Present Illness:  Derick Covarrubias is a 66 y.o. 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 Jo Rowe who is being seen today for evaluation of ongoing pain to her left knee. She does have a history of significant patellofemoral arthropathy and knee osteoarthritis to her contralateral right knee treated with a one-time steroid injection by Dr. Morelia Hidalgo in 2015. She states that a week ago on about 1/4/2020 when she was kneeling down vacuuming some carpeted steps at home when she felt a pop primarily to the anterior portion of her left knee prompting today's visit. She has had a fairly consistent achy 5-6 out of 10 pain with her knee with pain with positional changes distance walking crossing her legs walking on uneven surfaces and going up and down stairs. She has taken some Excedrin ice and Aspercreme but this has not improved her symptoms. Initially she was having some night pain but this has improved. No active locking or catching. Her symptoms have not substantially improved over the last week prompting today's visit and she is being seen today for orthopedic and sports consultation with updated imaging. She is not having any instability or buckling symptoms. Jose Tan was last seen in the office on 1/11/2021 and was diagnosed with significant bone-on-bone changes to the medial compartment of her left knee with patellofemoral arthropathy and knee synovitis. Initially she got a substantial improvement following her steroid injection but overall this is worn out and she would like to consider viscosupplementation.   It is more of an achy pain at this point and not so much the sharp stabbing pain she was having originally. She has tolerated her meloxicam and has been working on her home-based exercises. She would like to avoid arthroplasty as long as she can. She still has some difficulties with positional changes going up and down stairs and with sitting with her legs crossed. He is fairly stiff in the morning but loosens up as she walks. Denies locking catching or true instability symptoms. She does utilize her brace periodically. Tejinder Deshpande was last seen in the office on 2/10/2021 when we completed GELSYN-3 injections to her left knee. She presents back today stating that her knee symptoms are doing outstanding. She is at least 90% improved and is having much less pain with distance walking and prolonged standing. She still does stairs one at a time but states that her current symptoms are very tolerable currently. She has been working her home-based exercise program and is continue with her meloxicam.  Her greater concern today is of increasing pain about the anterior lateral aspect of her right ankle. She apparently was fishing down at [facility] in [facility] this past weekend on 5/15/2021 standing on a rocking bass boat and since that time she is having worsening pain about the anterior lateral aspect of her ankle which is quite substantial at 6-7 out of 10 with weightbearing. She has had some mild swelling. She has no recollection of inverting her ankle getting onto the boat. She is having rest pain at 3 out of 10 and up to about 6-7 out of 10 pain with weightbearing and pivoting in particular. She is concerned that her ankle pain will cause worsening pain on her contralateral left knee. She reports no sensations of loose bodies locking or catching and does have stiffness. She is being seen today for orthopedic and sports consultation for review of her left knee arthritis and evaluation of a new onset of symptoms to her right ankle.     Tejinder Deshpande was last seen in the crepitation. Palpation: She is much less tender over the medial and lateral patellofemoral facet and does have now more mild pain reproduced with patellar grind testing and diffuse primarily anterior third medial joint line tenderness on the left with less lateral pain. Rang of Motion: She does have less prominent stiffness in the terminal 5 degrees of extension. Flexion to about 110 with patellofemoral crepitation. Hamstrings mildly tight. Strength: 4 out of 5 with flexion extension. Special Tests: No substantial pain with patellar grind testing and pain with medial Belén's. No high-grade click. Negative lateral Belén's. No instability. Screening hip testing reasonably benign. Skin: There are no rashes, ulcerations or lesions. Distal neurovascular exam is intact. Gait: Improved gait    Reflex symmetrically preserved    Additional Comments: Right ankle evaluation does reveal improved bilaterally with trace ankle joint effusion. She does have much less tenderness to the anterior lateral talar dome with mild tenderness over the ATFL. No high-grade syndesmotic or fibular metaphyseal tenderness. Mild discomfort with palpation of the posterior tibialis tendon and increased pain at this point is noted over the peroneal's laterally extending to the posterior distal fibular shaft. No evidence of instability. 25% reduction in ankle motion with tightness to her Achilles and weakness 4-5 with eversion and dorsiflexion. Effectively negative anterior drawer. Negative talar tilt and Deng's testing. Contralateral ankle exam is benign. Additional Examinations:  Contralateral Exam: Examination of the right knee reveals intact skin. There is no focal tenderness. The patient demonstrates full painless range of motion with regards to flexion and extension. Strength is 5/5 thorough out all planes. Ligamentous stability is grossly intact.     Right Lower Extremity: Examination of the right lower extremity does not show any tenderness, deformity or injury. Range of motion is unremarkable. There is no gross instability. There are no rashes, ulcerations or lesions. Strength and tone are normal.  Left Lower Extremity: Examination of the left lower extremity does not show any tenderness, deformity or injury. Range of motion is unremarkable. There is no gross instability. There are no rashes, ulcerations or lesions. Strength and tone are normal.      Diagnostic Test Findings: Left knee AP and PA weightbearing sunrise and lateral films were reviewed from 1/11/2021 and does show significant medial compartment narrowing. This is not quite bone-on-bone. She does have advanced bone-on-bone patellofemoral arthropathy on the left. Right ankle AP lateral mortise films were reviewed from 5/19/2021 in the office which does show fairly prominent tibiotalar arthritic changes to the anterior lateral portion of the tibial talar joint. No evidence of acute fracture. Assessment : #1.  5 months status post improved aggravation significant medial compartment osteoarthritis with bone-on-bone patellofemoral arthropathy improved knee synovitis status post completion of left knee GELSYN-3 injections 2/10/2021. #2. Nearly 4 weeks status post improved aggravation right ankle significant osteoarthritis with proved ankle synovitis and posttraumatic peroneal tendinitis and pes planus    Impression:  Encounter Diagnoses   Name Primary?  Primary osteoarthritis of left knee Yes    Acute right ankle pain     Peroneal tendinitis of right lower extremity        Office Procedures:  Orders Placed This Encounter   Procedures    98590 - [place] INJECT TENDON SHEATH/LIGAMENT       Treatment Plan:  Treatment options were discussed with Daylin Lynne. We did once again review her plain films and exam findings. Does have quite prominent anterior medial compartment osteoarthritis.   Clinic at this time she states her knee symptoms are doing much better she is 90+ percent improved following completion of her GELSYN-3 injections on 2/10/2021. She was encouraged to continue with her exercise program with periodic use of her knee brace. She is very pleased with her knee symptoms. Her more pressing complaint with her last visit was increasing pain involving the ankle. She was found to have significant tibiotalar osteoarthritis but did not follow through with therapy opting for home-based exercises. She may either use an anklet or her lace up brace and we did review her exercise program as she did not attend physical therapy. I would recommend that she continues with her meloxicam at least for the next several weeks and most of her current pain seems to be over the peroneal tendons which we performed a peroneal tendon sheath injection on today using 1 cc of Celestone, 1 cc of Marcaine, 1 cc of Xylocaine. Gradual return to exercise program was discussed. We will see her back in about 4 weeks to see how her ankle is doing. If her ankle is doing well we will plan on seeing her for her knee after 8/10/2021 she would be eligible for repeat viscosupplementation. She will continue with her meloxicam and will contact us in the interim with questions or concerns. This dictation was performed with a verbal recognition program (DRAGON) and it was checked for errors. It is possible that there are still dictated errors within this office note. If so, please bring any errors to my attention for an addendum. All efforts were made to ensure that this office note is accurate.

## 2021-07-07 ENCOUNTER — OFFICE VISIT (OUTPATIENT)
Dept: ORTHOPEDIC SURGERY | Age: 76
End: 2021-07-07
Payer: MEDICARE

## 2021-07-07 VITALS — BODY MASS INDEX: 25.61 KG/M2 | HEIGHT: 64 IN | WEIGHT: 150 LBS

## 2021-07-07 DIAGNOSIS — M76.71 PERONEAL TENDINITIS OF RIGHT LOWER EXTREMITY: Primary | ICD-10-CM

## 2021-07-07 DIAGNOSIS — M25.571 ACUTE RIGHT ANKLE PAIN: ICD-10-CM

## 2021-07-07 PROCEDURE — 20610 DRAIN/INJ JOINT/BURSA W/O US: CPT | Performed by: FAMILY MEDICINE

## 2021-07-07 PROCEDURE — 1090F PRES/ABSN URINE INCON ASSESS: CPT | Performed by: FAMILY MEDICINE

## 2021-07-07 PROCEDURE — G8417 CALC BMI ABV UP PARAM F/U: HCPCS | Performed by: FAMILY MEDICINE

## 2021-07-07 PROCEDURE — 1036F TOBACCO NON-USER: CPT | Performed by: FAMILY MEDICINE

## 2021-07-07 PROCEDURE — 4040F PNEUMOC VAC/ADMIN/RCVD: CPT | Performed by: FAMILY MEDICINE

## 2021-07-07 PROCEDURE — L3040 FT ARCH SUPRT PREMOLD LONGIT: HCPCS | Performed by: FAMILY MEDICINE

## 2021-07-07 PROCEDURE — G8399 PT W/DXA RESULTS DOCUMENT: HCPCS | Performed by: FAMILY MEDICINE

## 2021-07-07 PROCEDURE — 99213 OFFICE O/P EST LOW 20 MIN: CPT | Performed by: FAMILY MEDICINE

## 2021-07-07 PROCEDURE — G8427 DOCREV CUR MEDS BY ELIG CLIN: HCPCS | Performed by: FAMILY MEDICINE

## 2021-07-07 PROCEDURE — 1123F ACP DISCUSS/DSCN MKR DOCD: CPT | Performed by: FAMILY MEDICINE

## 2021-07-07 RX ORDER — LIDOCAINE HYDROCHLORIDE 10 MG/ML
1 INJECTION, SOLUTION INFILTRATION; PERINEURAL ONCE
Status: COMPLETED | OUTPATIENT
Start: 2021-07-07 | End: 2021-07-07

## 2021-07-07 RX ORDER — BETAMETHASONE SODIUM PHOSPHATE AND BETAMETHASONE ACETATE 3; 3 MG/ML; MG/ML
6 INJECTION, SUSPENSION INTRA-ARTICULAR; INTRALESIONAL; INTRAMUSCULAR; SOFT TISSUE ONCE
Status: COMPLETED | OUTPATIENT
Start: 2021-07-07 | End: 2021-07-07

## 2021-07-07 RX ORDER — BUPIVACAINE HYDROCHLORIDE 2.5 MG/ML
1 INJECTION, SOLUTION INFILTRATION; PERINEURAL ONCE
Status: COMPLETED | OUTPATIENT
Start: 2021-07-07 | End: 2021-07-07

## 2021-07-07 RX ADMIN — LIDOCAINE HYDROCHLORIDE 1 ML: 10 INJECTION, SOLUTION INFILTRATION; PERINEURAL at 14:39

## 2021-07-07 RX ADMIN — BUPIVACAINE HYDROCHLORIDE 2.5 MG: 2.5 INJECTION, SOLUTION INFILTRATION; PERINEURAL at 14:38

## 2021-07-07 RX ADMIN — BETAMETHASONE SODIUM PHOSPHATE AND BETAMETHASONE ACETATE 6 MG: 3; 3 INJECTION, SUSPENSION INTRA-ARTICULAR; INTRALESIONAL; INTRAMUSCULAR; SOFT TISSUE at 14:40

## 2021-07-07 NOTE — PROGRESS NOTES
Chief Complaint  Ankle Pain (CK R ANKLE) and Knee Pain      Follow-up  left knee pain with with underlying significant left knee osteoarthritis status post completion of GELSYN-3 2/10/2021 with follow-up right ankle tibiotalar osteoarthritis with synovitis    History of Present Illness:  Kenneth Mcpherson is a 66 y.o. 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 Liliam Rowe who is being seen today for evaluation of ongoing pain to her left knee. She does have a history of significant patellofemoral arthropathy and knee osteoarthritis to her contralateral right knee treated with a one-time steroid injection by Dr. Ant Ortega in 2015. She states that a week ago on about 1/4/2020 when she was kneeling down vacuuming some carpeted steps at home when she felt a pop primarily to the anterior portion of her left knee prompting today's visit. She has had a fairly consistent achy 5-6 out of 10 pain with her knee with pain with positional changes distance walking crossing her legs walking on uneven surfaces and going up and down stairs. She has taken some Excedrin ice and Aspercreme but this has not improved her symptoms. Initially she was having some night pain but this has improved. No active locking or catching. Her symptoms have not substantially improved over the last week prompting today's visit and she is being seen today for orthopedic and sports consultation with updated imaging. She is not having any instability or buckling symptoms. Umair Shen was last seen in the office on 1/11/2021 and was diagnosed with significant bone-on-bone changes to the medial compartment of her left knee with patellofemoral arthropathy and knee synovitis. Initially she got a substantial improvement following her steroid injection but overall this is worn out and she would like to consider viscosupplementation.   It is more of an achy pain at this point and not so much the sharp stabbing pain she was having originally. She has tolerated her meloxicam and has been working on her home-based exercises. She would like to avoid arthroplasty as long as she can. She still has some difficulties with positional changes going up and down stairs and with sitting with her legs crossed. He is fairly stiff in the morning but loosens up as she walks. Denies locking catching or true instability symptoms. She does utilize her brace periodically. Jose C Ceballos was last seen in the office on 2/10/2021 when we completed GELSYN-3 injections to her left knee. She presents back today stating that her knee symptoms are doing outstanding. She is at least 90% improved and is having much less pain with distance walking and prolonged standing. She still does stairs one at a time but states that her current symptoms are very tolerable currently. She has been working her home-based exercise program and is continue with her meloxicam.  Her greater concern today is of increasing pain about the anterior lateral aspect of her right ankle. She apparently was fishing down at [facility] in [facility] this past weekend on 5/15/2021 standing on a rocking bass boat and since that time she is having worsening pain about the anterior lateral aspect of her ankle which is quite substantial at 6-7 out of 10 with weightbearing. She has had some mild swelling. She has no recollection of inverting her ankle getting onto the boat. She is having rest pain at 3 out of 10 and up to about 6-7 out of 10 pain with weightbearing and pivoting in particular. She is concerned that her ankle pain will cause worsening pain on her contralateral left knee. She reports no sensations of loose bodies locking or catching and does have stiffness. She is being seen today for orthopedic and sports consultation for review of her left knee arthritis and evaluation of a new onset of symptoms to her right ankle.     Jose C Ceballos was last seen in the office on 5/19/2021 and was started on conservative treatment for her ankle. Her knee is actually doing reasonably well with a 95% improvement and she is working on her home-based exercises. We felt that she had aggravation of her right ankle tibiotalar osteoarthritis and synovitis and recommended physical therapy. She did not do this opting for home-based exercises online. She presents back today stating that her ankle is about 75% improved but now seems to be having more pain over the peroneal tendons laterally. She was not comfortable utilizing the lace up brace and still has some soreness with distance walking and pivoting. She has tolerated her meloxicam and denies locking or catching. Her knee is still doing reasonably well post viscosupplementation and she is continue with her exercise program..    I saw Jenny Ramires in the office on 6/9/2021 for follow-up on her knee osteoarthritis which is still doing fairly well following completion of her GELSYN-3 injection series on the left on 2/10/2021. Her more pressing complaint last visit was ongoing pain to her right ankle. She was found previously to have substantial tibiotalar arthritic changes with likely reactive peroneal tendinitis symptoms. She is work on home-based exercises and has been using her lace up brace. Her primary care physicians were concerned about her taking the meloxicam and stopped this which is resulted in some increasing pain which now seems to be more localized over the tibial talar joint. She does seem to be having less pain over the peroneal tendon is very compliant with her home-based exercise program.  She does not typically utilize orthotics. While her ankle symptoms have improved substantially they have effectively plateaued at 70 to 13% better. Denies sensations of loose bodies locking or catching. Her knee symptoms are under reasonable control.     Pain Assessment  Location of Pain: Ankle  Severity of Pain: 2  Quality of Pain: Dull, Aching  Duration of Pain: Persistent  Frequency of Pain: Constant  Aggravating Factors: Walking  Limiting Behavior: Yes  Relieving Factors: Rest  Result of Injury: No  Work-Related Injury: No  Are there other pain locations you wish to document?: No         Medical History     Patient's medications, allergies, past medical, surgical, social and family histories were reviewed and updated as appropriate. Review of Systems  Pertinent items are noted in HPI  Review of systems reviewed from Patient History Form dated on 1/11/2021 and available in the patient's chart under the Media tab. Vital Signs  There were no vitals filed for this visit. General Exam:     Constitutional: Patient is adequately groomed with no evidence of malnutrition  DTRs: Deep tendon reflexes are intact  Mental Status: The patient is oriented to time, place and person. The patient's mood and affect are appropriate. Lymphatic: The lymphatic examination bilaterally reveals all areas to be without enlargement or induration. Vascular: Examination reveals no swelling or calf tenderness. Peripheral pulses are palpable and 2+. Neurological: The patient has good coordination. There is no weakness or sensory deficit. Knee Examination  Inspection: No high-grade deformity although she does have a trace knee joint effusion. She does have patellofemoral crepitation. Palpation: She is much less tender over the medial and lateral patellofemoral facet and does have now more mild pain reproduced with patellar grind testing and diffuse primarily anterior third medial joint line tenderness on the left with less lateral pain. Rang of Motion: She does have less prominent stiffness in the terminal 5 degrees of extension. Flexion to about 110 with patellofemoral crepitation. Hamstrings mildly tight. Strength: 4 out of 5 with flexion extension.     Special Tests: No substantial pain with patellar grind testing and pain with medial Belén's. No high-grade click. Negative lateral Belén's. No instability. Screening hip testing reasonably benign. Skin: There are no rashes, ulcerations or lesions. Distal neurovascular exam is intact. Gait: Improved gait    Reflex symmetrically preserved    Additional Comments: Right ankle evaluation does reveal improved bilaterally with trace ankle joint effusion. She does have persistent mild to moderate tenderness to the anterior lateral talar dome with mild tenderness over the ATFL. No high-grade syndesmotic or fibular metaphyseal tenderness. Mild discomfort with palpation of the posterior tibialis tendon and substantially improved tenderness over the peroneal's. .  No evidence of instability or tendon subluxation. .  25% reduction in ankle motion with some ongoing tightness to her Achilles and weakness 4-5 with eversion and dorsiflexion. Effectively negative anterior drawer. Negative talar tilt and Deng's testing. Contralateral ankle exam is benign. Additional Examinations:  Contralateral Exam: Examination of the right knee reveals intact skin. There is no focal tenderness. The patient demonstrates full painless range of motion with regards to flexion and extension. Strength is 5/5 thorough out all planes. Ligamentous stability is grossly intact. Right Lower Extremity: Examination of the right lower extremity does not show any tenderness, deformity or injury. Range of motion is unremarkable. There is no gross instability. There are no rashes, ulcerations or lesions. Strength and tone are normal.  Left Lower Extremity: Examination of the left lower extremity does not show any tenderness, deformity or injury. Range of motion is unremarkable. There is no gross instability. There are no rashes, ulcerations or lesions.   Strength and tone are normal.      Diagnostic Test Findings: Left knee AP and PA weightbearing sunrise and lateral films were reviewed from 1/11/2021 and does show significant medial compartment narrowing. This is not quite bone-on-bone. She does have advanced bone-on-bone patellofemoral arthropathy on the left. Right ankle AP lateral mortise films were reviewed from 5/19/2021 in the office which does show fairly prominent tibiotalar arthritic changes to the anterior lateral portion of the tibial talar joint. No evidence of acute fracture. Assessment : #1.  6 months status post improved aggravation significant medial compartment osteoarthritis with bone-on-bone patellofemoral arthropathy improved knee synovitis status post completion of left knee GELSYN-3 injections 2/10/2021. #2. Nearly 8 weeks status post partially improved aggravation right ankle significant osteoarthritis with persistent ankle synovitis and improved posttraumatic peroneal tendinitis and pes planus    Impression:  Encounter Diagnoses   Name Primary?  Peroneal tendinitis of right lower extremity Yes    Acute right ankle pain        Office Procedures:  Orders Placed This Encounter   Procedures    [place] ARTHROCENTESIS ASPIR&/INJ MAJOR JT/BURSA W/O US    Powerstep Protech Full Length Insert     Patient was prescribed Powerstep Protech Full Length Inserts. The bilateral FEET will require stabilization / support from this semi-rigid / rigid orthosis to improve their function. The orthosis will assist in protecting the affected area, provide functional support and facilitate healing. The patient was educated and fit by a healthcare professional with expert knowledge and specialization in brace application while under the direct supervision of the treating physician. Verbal and written instructions for the use of and application of this item were provided. They were instructed to contact the office immediately should the brace result in increased pain, decreased sensation, increased swelling or worsening of the condition.        Treatment Plan:  Treatment options were discussed with Malena Hunter. We did once again review her plain films and exam findings. Does have quite prominent anterior medial compartment osteoarthritis. Clinic at this time she states her knee symptoms are doing much better she is 90+ percent improved following completion of her GELSYN-3 injections on 2/10/2021. She was encouraged to continue with her exercise program with periodic use of her knee brace. She is very pleased with her knee symptoms. Her more pressing complaint with her last visit was persistent pain involving the ankle. She was found to have significant tibiotalar osteoarthritis and while she has improved over the last couple of months she is effectively plateaued about 38% improvement. She is having less pain over the peroneals at this point seems to be having more tibial talar pain. After discussing options, we did perform a right ankle tibiotalar injection using 1 cc of Celestone, 1 cc of Marcaine, 1 cc of Xylocaine. She will continue with her exercise program we did give her power step inserts. She will utilize her ankle brace when she is out working in the yard and walking. We will see her back in about 3 to 4 weeks and consider ankle imaging if her ankle symptoms are persistent. Potential evaluation with Dr. Bijan Gaona was also discussed. She will contact us in the interim with questions or concerns. She would not be eligible for repeat viscosupplementation to her knee until after 8/10/2021. She will contact us in the interim with questions or concerns. This dictation was performed with a verbal recognition program (DRAGON) and it was checked for errors. It is possible that there are still dictated errors within this office note. If so, please bring any errors to my attention for an addendum. All efforts were made to ensure that this office note is accurate.

## 2021-07-18 NOTE — PROGRESS NOTES
Monika Serrato 66 y.o. female presents today for an Established patient for   Chief Complaint   Patient presents with    Hyperlipidemia    Other     [address] Gaps            ASSESSMENT/PLAN    1. Renal insufficiency            Creatinine slightly more elevated 1.3. Of significance patient has been on Voltaren NSAID for several months per orthopedic. She stopped the pill about 3 to 4 weeks ago. Is likely account for the increase in creatinine level. She will stay off the medicine. Repeat creatinine level in about 3 months call me next day for results. - Comprehensive Metabolic Panel; Future    2. Hyperlipidemia, unspecified hyperlipidemia type        Stable. Will continue present treatment  - Lipid Panel; Future  - Comprehensive Metabolic Panel; Future    3. Major depressive disorder in full remission, unspecified whether recurrent ([address]               Stable on present medicine. 4. Bronchiectasis without complication ([address]                     Asymptomatic at this time. 5. Gastroesophageal reflux disease with esophagitis without hemorrhage                               The present symptoms. Doing well    6. Insomnia, unspecified type                     Doing well at this time. 7. Irritable bowel syndrome without diarrhea                    Stable. 8.  Peroneal tendinitis right lower extremity:                     Follow-up with orthopedics. 9.  Covid +19                   Patient finishing 10 days quarantine. Continue to monitor for chance of any fever for the next 24 hours. I spent greater than 45 minutes with this patient face-to-face discussing, evaluating, her multiple medical problems of significance including renal insufficiency, positive for Covid 19 infection, hyperlipidemia, and orthopedic tendinitis and depression. .  Ordering follow-up laboratory studies and monitoring.     Return in about 6 months (around 1/20/2022) for LIPIDS   RI.     HPI:     Patient with hyperlipidemia, insomnia, IBS, bronchiectasis, GERD, renal insufficiency, depression. Last office visit with me: 2/27/2020. Health maintenance: Hepatitis C, shingles, Tdap. Review laboratory data 9/8/2020: Chemistry panel: Creatinine: 1.2. Lipid panel: Total cholesterol: 168. LDL cholesterol: 90.  7/20: Mammogram: Normal.  Reviewed office visit: 7/7/2021 orthopedics Dr. Barbra Maria: DX peroneal tendinitis right lower extremity and acute right ankle pain. Review laboratory data 7/19/2021. Chemistry panel. Creatinine: 1.3. Lipid panel: Total cholesterol: 154. LDL cholesterol: 82. This compares to laboratory data 9/8/2020: Chemistry panel: Creatinine 1.2. Lipid panel: Total cholesterol: 168. Of significance has seen orthopedics Dr. Barbra Maria and because of her tendinitis has been taking Voltaren for several months. Recently stopped in the last 3 to 4 weeks. She is gotten injections in the knee which has been helpful  Patient relates her  positive for Covid despite vaccine. Patient herself with similar symptoms although day 10 of quarantine. No fever. Asymptomatic at this time.       Results for orders placed or performed during the hospital encounter of 07/19/21   Comprehensive Metabolic Panel   Result Value Ref Range    Sodium 139 136 - 145 mmol/L    Potassium 4.6 3.5 - 5.1 mmol/L    Chloride 102 99 - 110 mmol/L    CO2 26 21 - 32 mmol/L    Anion Gap 11 3 - 16    Glucose 87 70 - 99 mg/dL    BUN 19 7 - 20 mg/dL    CREATININE 1.3 (H) 0.6 - 1.2 mg/dL    GFR Non- 40 (A) >60    GFR  48 (A) >60    Calcium 9.6 8.3 - 10.6 mg/dL    Total Protein 7.0 6.4 - 8.2 g/dL    Albumin 3.8 3.4 - 5.0 g/dL    Albumin/Globulin Ratio 1.2 1.1 - 2.2    Total Bilirubin 0.3 0.0 - 1.0 mg/dL    Alkaline Phosphatase 66 40 - 129 U/L    ALT 9 (L) 10 - 40 U/L    AST 15 15 - 37 U/L    Globulin 3.2 g/dL   Lipid Panel   Result Value Ref Range    Cholesterol, Total 154 0 - 199 mg/dL    Triglycerides 124 0 - 150 mg/dL    HDL 47 40 - 60 mg/dL    LDL Calculated 82 <100 mg/dL    VLDL Cholesterol Calculated 25 Not Established mg/dL              ROS:  Review of Systems   Constitutional: negative   HENT: negative   EYES: negative   Respiratory: negative   Gastrointestinal: negative   Endocrine: negative   Musculoskeletal: Tendinitis. Sees orthopedics  Skin: negative   Allergic/Immunological: negative   Hematological: negative   Psychiatric/Behavorial: negative   CV: negative   CNS: negative   :Negative   S/E:Negative  Renal: Slight elevation of creatinine 1.3    Physical Exam:  Head/neck: Ears: Normal TM. No obstruction. Throat: Mask. Not examined. Thyroid not palpable. Neck: No lymphadenopathy. Eyes: EOMI, PERRLA with no nystagmus  Lungs: Clear to auscultation. CV: S1-S2 normal.   LOUIS murmur. Carotid: No bruit. Abdominal Examination: Bowel sounds present. Soft nontender. No mass no guarding or   rebound. Spine/extremities: Ankle swelling right greater than left edema. No tenderness to palpation. Skin: No rash  CNS: Patient is alert, cooperative, moves all 4 limbs, ambulates without difficulty, light touch normal.   Good historian. Good orientation. Blood pressure 120/68, pulse 78, temperature 97.3 °F (36.3 °C), temperature source Infrared, height 5' 4\" (1.626 m), weight 156 lb (70.8 kg), SpO2 90 %, not currently breastfeeding.            Sulma Sams MD

## 2021-07-19 ENCOUNTER — HOSPITAL ENCOUNTER (OUTPATIENT)
Age: 76
Discharge: HOME | End: 2021-07-19
Payer: MEDICARE

## 2021-07-19 DIAGNOSIS — N18.30 STAGE 3 CHRONIC KIDNEY DISEASE, UNSPECIFIED WHETHER STAGE 3A OR 3B CKD (HCC): ICD-10-CM

## 2021-07-19 DIAGNOSIS — E78.5 HYPERLIPIDEMIA, UNSPECIFIED HYPERLIPIDEMIA TYPE: ICD-10-CM

## 2021-07-19 LAB
A/G RATIO: 1.2 (ref 1.1–2.2)
ALBUMIN SERPL-MCNC: 3.8 G/DL (ref 3.4–5)
ALP BLD-CCNC: 66 U/L (ref 40–129)
ALT SERPL-CCNC: 9 U/L (ref 10–40)
ANION GAP SERPL CALCULATED.3IONS-SCNC: 11 MMOL/L (ref 3–16)
AST SERPL-CCNC: 15 U/L (ref 15–37)
BILIRUB SERPL-MCNC: 0.3 MG/DL (ref 0–1)
BUN BLDV-MCNC: 19 MG/DL (ref 7–20)
CALCIUM SERPL-MCNC: 9.6 MG/DL (ref 8.3–10.6)
CHLORIDE BLD-SCNC: 102 MMOL/L (ref 99–110)
CHOLESTEROL, TOTAL: 154 MG/DL (ref 0–199)
CO2: 26 MMOL/L (ref 21–32)
CREAT SERPL-MCNC: 1.3 MG/DL (ref 0.6–1.2)
GFR AFRICAN AMERICAN: 48
GFR NON-AFRICAN AMERICAN: 40
GLOBULIN: 3.2 G/DL
GLUCOSE BLD-MCNC: 87 MG/DL (ref 70–99)
HDLC SERPL-MCNC: 47 MG/DL (ref 40–60)
LDL CHOLESTEROL CALCULATED: 82 MG/DL
POTASSIUM SERPL-SCNC: 4.6 MMOL/L (ref 3.5–5.1)
SODIUM BLD-SCNC: 139 MMOL/L (ref 136–145)
TOTAL PROTEIN: 7 G/DL (ref 6.4–8.2)
TRIGL SERPL-MCNC: 124 MG/DL (ref 0–150)
VLDLC SERPL CALC-MCNC: 25 MG/DL

## 2021-07-19 PROCEDURE — 80053 COMPREHEN METABOLIC PANEL: CPT

## 2021-07-19 PROCEDURE — 36415 COLL VENOUS BLD VENIPUNCTURE: CPT

## 2021-07-19 PROCEDURE — 80061 LIPID PANEL: CPT

## 2021-07-20 ENCOUNTER — OFFICE VISIT (OUTPATIENT)
Dept: INTERNAL MEDICINE CLINIC | Age: 76
End: 2021-07-20
Payer: MEDICARE

## 2021-07-20 VITALS
HEART RATE: 78 BPM | SYSTOLIC BLOOD PRESSURE: 120 MMHG | BODY MASS INDEX: 26.63 KG/M2 | TEMPERATURE: 97.3 F | WEIGHT: 156 LBS | HEIGHT: 64 IN | DIASTOLIC BLOOD PRESSURE: 68 MMHG | OXYGEN SATURATION: 90 %

## 2021-07-20 DIAGNOSIS — E78.5 HYPERLIPIDEMIA, UNSPECIFIED HYPERLIPIDEMIA TYPE: ICD-10-CM

## 2021-07-20 DIAGNOSIS — F32.5 MAJOR DEPRESSIVE DISORDER IN FULL REMISSION, UNSPECIFIED WHETHER RECURRENT (HCC): ICD-10-CM

## 2021-07-20 DIAGNOSIS — U07.1 LAB TEST POSITIVE FOR DETECTION OF COVID-19 VIRUS: ICD-10-CM

## 2021-07-20 DIAGNOSIS — J47.9 BRONCHIECTASIS WITHOUT COMPLICATION (HCC): ICD-10-CM

## 2021-07-20 DIAGNOSIS — G47.00 INSOMNIA, UNSPECIFIED TYPE: ICD-10-CM

## 2021-07-20 DIAGNOSIS — K58.9 IRRITABLE BOWEL SYNDROME WITHOUT DIARRHEA: ICD-10-CM

## 2021-07-20 DIAGNOSIS — M76.71 PERONEAL TENDINITIS OF RIGHT LOWER EXTREMITY: ICD-10-CM

## 2021-07-20 DIAGNOSIS — K21.00 GASTROESOPHAGEAL REFLUX DISEASE WITH ESOPHAGITIS WITHOUT HEMORRHAGE: ICD-10-CM

## 2021-07-20 DIAGNOSIS — N28.9 RENAL INSUFFICIENCY: Primary | ICD-10-CM

## 2021-07-20 PROCEDURE — 1090F PRES/ABSN URINE INCON ASSESS: CPT | Performed by: INTERNAL MEDICINE

## 2021-07-20 PROCEDURE — G8399 PT W/DXA RESULTS DOCUMENT: HCPCS | Performed by: INTERNAL MEDICINE

## 2021-07-20 PROCEDURE — 99215 OFFICE O/P EST HI 40 MIN: CPT | Performed by: INTERNAL MEDICINE

## 2021-07-20 PROCEDURE — 4040F PNEUMOC VAC/ADMIN/RCVD: CPT | Performed by: INTERNAL MEDICINE

## 2021-07-20 PROCEDURE — 1036F TOBACCO NON-USER: CPT | Performed by: INTERNAL MEDICINE

## 2021-07-20 PROCEDURE — 1123F ACP DISCUSS/DSCN MKR DOCD: CPT | Performed by: INTERNAL MEDICINE

## 2021-07-20 PROCEDURE — G8417 CALC BMI ABV UP PARAM F/U: HCPCS | Performed by: INTERNAL MEDICINE

## 2021-07-20 PROCEDURE — G8427 DOCREV CUR MEDS BY ELIG CLIN: HCPCS | Performed by: INTERNAL MEDICINE

## 2021-07-20 SDOH — ECONOMIC STABILITY: FOOD INSECURITY: WITHIN THE PAST 12 MONTHS, THE FOOD YOU BOUGHT JUST DIDN'T LAST AND YOU DIDN'T HAVE MONEY TO GET MORE.: NEVER TRUE

## 2021-07-20 SDOH — ECONOMIC STABILITY: FOOD INSECURITY: WITHIN THE PAST 12 MONTHS, YOU WORRIED THAT YOUR FOOD WOULD RUN OUT BEFORE YOU GOT MONEY TO BUY MORE.: NEVER TRUE

## 2021-07-20 ASSESSMENT — SOCIAL DETERMINANTS OF HEALTH (SDOH): HOW HARD IS IT FOR YOU TO PAY FOR THE VERY BASICS LIKE FOOD, HOUSING, MEDICAL CARE, AND HEATING?: NOT HARD AT ALL

## 2021-07-28 ENCOUNTER — OFFICE VISIT (OUTPATIENT)
Dept: ORTHOPEDIC SURGERY | Age: 76
End: 2021-07-28
Payer: MEDICARE

## 2021-07-28 VITALS — HEIGHT: 64 IN | WEIGHT: 150 LBS | BODY MASS INDEX: 25.61 KG/M2

## 2021-07-28 DIAGNOSIS — M22.42 CHONDROMALACIA PATELLAE OF LEFT KNEE: ICD-10-CM

## 2021-07-28 DIAGNOSIS — M25.571 ACUTE RIGHT ANKLE PAIN: ICD-10-CM

## 2021-07-28 DIAGNOSIS — M19.072 PRIMARY OSTEOARTHRITIS OF LEFT ANKLE: ICD-10-CM

## 2021-07-28 DIAGNOSIS — M76.71 PERONEAL TENDINITIS OF RIGHT LOWER EXTREMITY: Primary | ICD-10-CM

## 2021-07-28 DIAGNOSIS — M17.12 PRIMARY OSTEOARTHRITIS OF LEFT KNEE: ICD-10-CM

## 2021-07-28 PROCEDURE — 4040F PNEUMOC VAC/ADMIN/RCVD: CPT | Performed by: FAMILY MEDICINE

## 2021-07-28 PROCEDURE — 99213 OFFICE O/P EST LOW 20 MIN: CPT | Performed by: FAMILY MEDICINE

## 2021-07-28 PROCEDURE — G8399 PT W/DXA RESULTS DOCUMENT: HCPCS | Performed by: FAMILY MEDICINE

## 2021-07-28 PROCEDURE — G8427 DOCREV CUR MEDS BY ELIG CLIN: HCPCS | Performed by: FAMILY MEDICINE

## 2021-07-28 PROCEDURE — 1090F PRES/ABSN URINE INCON ASSESS: CPT | Performed by: FAMILY MEDICINE

## 2021-07-28 PROCEDURE — 1123F ACP DISCUSS/DSCN MKR DOCD: CPT | Performed by: FAMILY MEDICINE

## 2021-07-28 PROCEDURE — 1036F TOBACCO NON-USER: CPT | Performed by: FAMILY MEDICINE

## 2021-07-28 PROCEDURE — G8417 CALC BMI ABV UP PARAM F/U: HCPCS | Performed by: FAMILY MEDICINE

## 2021-07-28 NOTE — PROGRESS NOTES
pain she was having originally. She has tolerated her meloxicam and has been working on her home-based exercises. She would like to avoid arthroplasty as long as she can. She still has some difficulties with positional changes going up and down stairs and with sitting with her legs crossed. He is fairly stiff in the morning but loosens up as she walks. Denies locking catching or true instability symptoms. She does utilize her brace periodically. Susan Chen was last seen in the office on 2/10/2021 when we completed GELSYN-3 injections to her left knee. She presents back today stating that her knee symptoms are doing outstanding. She is at least 90% improved and is having much less pain with distance walking and prolonged standing. She still does stairs one at a time but states that her current symptoms are very tolerable currently. She has been working her home-based exercise program and is continue with her meloxicam.  Her greater concern today is of increasing pain about the anterior lateral aspect of her right ankle. She apparently was fishing down at [facility] in [facility] this past weekend on 5/15/2021 standing on a rocking bass boat and since that time she is having worsening pain about the anterior lateral aspect of her ankle which is quite substantial at 6-7 out of 10 with weightbearing. She has had some mild swelling. She has no recollection of inverting her ankle getting onto the boat. She is having rest pain at 3 out of 10 and up to about 6-7 out of 10 pain with weightbearing and pivoting in particular. She is concerned that her ankle pain will cause worsening pain on her contralateral left knee. She reports no sensations of loose bodies locking or catching and does have stiffness. She is being seen today for orthopedic and sports consultation for review of her left knee arthritis and evaluation of a new onset of symptoms to her right ankle.     Susan Chen was last seen in the office on 5/19/2021 and was started on conservative treatment for her ankle. Her knee is actually doing reasonably well with a 95% improvement and she is working on her home-based exercises. We felt that she had aggravation of her right ankle tibiotalar osteoarthritis and synovitis and recommended physical therapy. She did not do this opting for home-based exercises online. She presents back today stating that her ankle is about 75% improved but now seems to be having more pain over the peroneal tendons laterally. She was not comfortable utilizing the lace up brace and still has some soreness with distance walking and pivoting. She has tolerated her meloxicam and denies locking or catching. Her knee is still doing reasonably well post viscosupplementation and she is continue with her exercise program..    I saw Chyna Nash in the office on 6/9/2021 for follow-up on her knee osteoarthritis which is still doing fairly well following completion of her GELSYN-3 injection series on the left on 2/10/2021. Her more pressing complaint last visit was ongoing pain to her right ankle. She was found previously to have substantial tibiotalar arthritic changes with likely reactive peroneal tendinitis symptoms. She is work on home-based exercises and has been using her lace up brace. Her primary care physicians were concerned about her taking the meloxicam and stopped this which is resulted in some increasing pain which now seems to be more localized over the tibial talar joint. She does seem to be having less pain over the peroneal tendon is very compliant with her home-based exercise program.  She does not typically utilize orthotics. While her ankle symptoms have improved substantially they have effectively plateaued at 70 to 36% better. Denies sensations of loose bodies locking or catching. Her knee symptoms are under reasonable control.         Chyna Nash was last seen in the office on 7/7/2021 and was continued on treatment for her degenerative right tibiotalar osteoarthritis recently completed some 3 injections in February 2021 for her knee. She presents back today stating that she is doing outstanding. Her knee symptoms are certainly tolerable and she got a 99.5% improvement in her ankle symptoms following her tibial talar injection on 7/7/2021 is no work on her home-based exercise program for her ankle and knee. She is utilizing her inserts and working on her stretching program and is quite pleased with her progress. Denies sensations of loose bodies locking or catching. Medical History     Patient's medications, allergies, past medical, surgical, social and family histories were reviewed and updated as appropriate. Review of Systems  Pertinent items are noted in HPI  Review of systems reviewed from Patient History Form dated on 1/11/2021 and available in the patient's chart under the Media tab. Vital Signs  There were no vitals filed for this visit. General Exam:     Constitutional: Patient is adequately groomed with no evidence of malnutrition  DTRs: Deep tendon reflexes are intact  Mental Status: The patient is oriented to time, place and person. The patient's mood and affect are appropriate. Lymphatic: The lymphatic examination bilaterally reveals all areas to be without enlargement or induration. Vascular: Examination reveals no swelling or calf tenderness. Peripheral pulses are palpable and 2+. Neurological: The patient has good coordination. There is no weakness or sensory deficit. Knee Examination  Inspection: No high-grade deformity although she does have a trace knee joint effusion. She does have patellofemoral crepitation.     Palpation: She is much less tender over the medial and lateral patellofemoral facet and does have now more mild pain reproduced with patellar grind testing and diffuse primarily anterior third medial joint line tenderness on the left with less lateral pain.    Rang of Motion: She does have less prominent stiffness in the terminal 5 degrees of extension. Flexion to about 110 with patellofemoral crepitation. Hamstrings mildly tight. Strength: 4 out of 5 with flexion extension. Special Tests: No substantial pain with patellar grind testing and pain with medial Belén's. No high-grade click. Negative lateral Belén's. No instability. Screening hip testing reasonably benign. Skin: There are no rashes, ulcerations or lesions. Distal neurovascular exam is intact. Gait: Improved gait    Reflex symmetrically preserved    Additional Comments: Right ankle evaluation does reveal improved bilaterally with trace ankle joint effusion. She does have now just mild tenderness to the anterior lateral talar dome with mild tenderness over the ATFL. No high-grade syndesmotic or fibular metaphyseal tenderness. Mild discomfort with palpation of the posterior tibialis tendon and substantially improved tenderness over the peroneal's. .  No evidence of instability or tendon subluxation. . She demonstrates improvement in her ankle motion with some ongoing tightness to her Achilles and proving strength at 4+ out of 5 with eversion and dorsiflexion. Effectively negative anterior drawer. Negative talar tilt and Deng's testing. Contralateral ankle exam is benign. Additional Examinations:  Contralateral Exam: Examination of the right knee reveals intact skin. There is no focal tenderness. The patient demonstrates full painless range of motion with regards to flexion and extension. Strength is 5/5 thorough out all planes. Ligamentous stability is grossly intact. Right Lower Extremity: Examination of the right lower extremity does not show any tenderness, deformity or injury. Range of motion is unremarkable. There is no gross instability. There are no rashes, ulcerations or lesions.   Strength and tone are normal.  Left Lower Extremity: Examination of the left lower extremity does not show any tenderness, deformity or injury. Range of motion is unremarkable. There is no gross instability. There are no rashes, ulcerations or lesions. Strength and tone are normal.      Diagnostic Test Findings: Left knee AP and PA weightbearing sunrise and lateral films were reviewed from 1/11/2021 and does show significant medial compartment narrowing. This is not quite bone-on-bone. She does have advanced bone-on-bone patellofemoral arthropathy on the left. Right ankle AP lateral mortise films were reviewed from 5/19/2021 in the office which does show fairly prominent tibiotalar arthritic changes to the anterior lateral portion of the tibial talar joint. No evidence of acute fracture. Assessment : #1.  6 months status post improved aggravation significant medial compartment osteoarthritis with bone-on-bone patellofemoral arthropathy improved knee synovitis status post completion of left knee GELSYN-3 injections 2/10/2021. #2. Nearly 3 months status post potentially improved aggravation right ankle significant osteoarthritis with resolved ankle synovitis and improved posttraumatic peroneal tendinitis and pes planus    Impression:  Encounter Diagnoses   Name Primary?  Peroneal tendinitis of right lower extremity Yes    Acute right ankle pain     Primary osteoarthritis of left knee     Chondromalacia patellae of left knee     Primary osteoarthritis of left ankle        Office Procedures:  No orders of the defined types were placed in this encounter. Treatment Plan:  Treatment options were discussed with Marquise Keen. We did once again review her plain films and exam findings. Does have quite prominent anterior medial compartment osteoarthritis. Clinic at this time she states her knee symptoms are doing much better she is 90+ percent improved following completion of her GELSYN-3 injections on 2/10/2021.   She was encouraged to continue with her exercise program with periodic use of her knee brace. She is very pleased with her knee symptoms. Her more pressing complaint with her last visit was persistent pain involving the ankle and presents back today stating that her symptoms are markedly improved and she is effectively back to her baseline. Her knee is also doing much better and will continue with her exercise program as well as use of her power step inserts. Icing and activity modification was discussed. She is aware that she is eligible for viscosupplementation once again to her knee on 8/10/2021 and will consider viscosupplementation at that time although she may well hold off at that point and will contact us when she starts to get sore. Icing and activity modification was discussed as was continuation of her walking program.  She will contact us with questions or concerns. .    This dictation was performed with a verbal recognition program (DRAGON) and it was checked for errors. It is possible that there are still dictated errors within this office note. If so, please bring any errors to my attention for an addendum. All efforts were made to ensure that this office note is accurate.

## 2021-08-04 ENCOUNTER — HOSPITAL ENCOUNTER (OUTPATIENT)
Dept: WOMENS IMAGING | Age: 76
Discharge: HOME | End: 2021-08-04
Payer: MEDICARE

## 2021-08-04 VITALS — HEIGHT: 64 IN | WEIGHT: 150 LBS | BODY MASS INDEX: 25.61 KG/M2

## 2021-08-04 DIAGNOSIS — Z12.31 VISIT FOR SCREENING MAMMOGRAM: ICD-10-CM

## 2021-08-04 PROCEDURE — 77063 BREAST TOMOSYNTHESIS BI: CPT

## 2021-08-12 DIAGNOSIS — E78.5 HYPERLIPIDEMIA: ICD-10-CM

## 2021-08-12 RX ORDER — PRAVASTATIN SODIUM 20 MG
TABLET ORAL
Qty: 90 TABLET | Refills: 1 | Status: SHIPPED | OUTPATIENT
Start: 2021-08-12 | End: 2022-02-08

## 2021-08-12 NOTE — TELEPHONE ENCOUNTER
Refill request for pravastatin medication.      Name of [facility]- [facility]      Last visit - 7/28/21     Pending visit - 1/20/22    Last refill -4/29/21

## 2021-08-13 DIAGNOSIS — M17.12 PRIMARY OSTEOARTHRITIS OF LEFT KNEE: Primary | ICD-10-CM

## 2021-08-13 DIAGNOSIS — M25.562 ACUTE PAIN OF LEFT KNEE: ICD-10-CM

## 2021-08-13 DIAGNOSIS — M22.42 CHONDROMALACIA PATELLAE OF LEFT KNEE: ICD-10-CM

## 2021-08-17 ENCOUNTER — TELEPHONE (OUTPATIENT)
Dept: ORTHOPEDIC SURGERY | Age: 76
End: 2021-08-17

## 2021-08-17 NOTE — TELEPHONE ENCOUNTER
CALLED PATIENT TO LET THEM KNOW THEIR VISCO INJECTIONS WERE AUTHORIZED. LEFT MESSAGE TO HAVE PATIENT CALL AND SCHEDULE AT HER CONVENIENCE.

## 2021-09-01 ENCOUNTER — OFFICE VISIT (OUTPATIENT)
Dept: ORTHOPEDIC SURGERY | Age: 76
End: 2021-09-01
Payer: MEDICARE

## 2021-09-01 VITALS — WEIGHT: 150 LBS | HEIGHT: 64 IN | BODY MASS INDEX: 25.61 KG/M2

## 2021-09-01 DIAGNOSIS — M17.12 PRIMARY OSTEOARTHRITIS OF LEFT KNEE: Primary | ICD-10-CM

## 2021-09-01 DIAGNOSIS — M25.562 ACUTE PAIN OF LEFT KNEE: ICD-10-CM

## 2021-09-01 DIAGNOSIS — M22.42 CHONDROMALACIA PATELLAE OF LEFT KNEE: ICD-10-CM

## 2021-09-01 PROCEDURE — 20610 DRAIN/INJ JOINT/BURSA W/O US: CPT | Performed by: FAMILY MEDICINE

## 2021-09-01 NOTE — PROGRESS NOTES
Chief Complaint  Follow-up (Gelsyn-3 Injection - Left Knee)      Follow-up  left knee pain with with underlying significant left knee osteoarthritis status post completion of GELSYN-3 2/10/2021 with follow-up right ankle tibiotalar osteoarthritis with synovitis    History of Present Illness:  Tatiana Hanley is a 66 y.o. 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 Teodoro Rowe who is being seen today for evaluation of ongoing pain to her left knee. She does have a history of significant patellofemoral arthropathy and knee osteoarthritis to her contralateral right knee treated with a one-time steroid injection by Dr. Mabel Claros in 2015. She states that a week ago on about 1/4/2020 when she was kneeling down vacuuming some carpeted steps at home when she felt a pop primarily to the anterior portion of her left knee prompting today's visit. She has had a fairly consistent achy 5-6 out of 10 pain with her knee with pain with positional changes distance walking crossing her legs walking on uneven surfaces and going up and down stairs. She has taken some Excedrin ice and Aspercreme but this has not improved her symptoms. Initially she was having some night pain but this has improved. No active locking or catching. Her symptoms have not substantially improved over the last week prompting today's visit and she is being seen today for orthopedic and sports consultation with updated imaging. She is not having any instability or buckling symptoms. Jing Hawley was last seen in the office on 1/11/2021 and was diagnosed with significant bone-on-bone changes to the medial compartment of her left knee with patellofemoral arthropathy and knee synovitis. Initially she got a substantial improvement following her steroid injection but overall this is worn out and she would like to consider viscosupplementation.   It is more of an achy pain at this point and not so much the sharp stabbing pain she was having originally. She has tolerated her meloxicam and has been working on her home-based exercises. She would like to avoid arthroplasty as long as she can. She still has some difficulties with positional changes going up and down stairs and with sitting with her legs crossed. He is fairly stiff in the morning but loosens up as she walks. Denies locking catching or true instability symptoms. She does utilize her brace periodically. Bertha Elkins was last seen in the office on 2/10/2021 when we completed GELSYN-3 injections to her left knee. She presents back today stating that her knee symptoms are doing outstanding. She is at least 90% improved and is having much less pain with distance walking and prolonged standing. She still does stairs one at a time but states that her current symptoms are very tolerable currently. She has been working her home-based exercise program and is continue with her meloxicam.  Her greater concern today is of increasing pain about the anterior lateral aspect of her right ankle. She apparently was fishing down at [facility] in [facility] this past weekend on 5/15/2021 standing on a rocking bass boat and since that time she is having worsening pain about the anterior lateral aspect of her ankle which is quite substantial at 6-7 out of 10 with weightbearing. She has had some mild swelling. She has no recollection of inverting her ankle getting onto the boat. She is having rest pain at 3 out of 10 and up to about 6-7 out of 10 pain with weightbearing and pivoting in particular. She is concerned that her ankle pain will cause worsening pain on her contralateral left knee. She reports no sensations of loose bodies locking or catching and does have stiffness. She is being seen today for orthopedic and sports consultation for review of her left knee arthritis and evaluation of a new onset of symptoms to her right ankle.     Bertha Elkins was last seen in the office on 5/19/2021 and was started on conservative treatment for her ankle. Her knee is actually doing reasonably well with a 95% improvement and she is working on her home-based exercises. We felt that she had aggravation of her right ankle tibiotalar osteoarthritis and synovitis and recommended physical therapy. She did not do this opting for home-based exercises online. She presents back today stating that her ankle is about 75% improved but now seems to be having more pain over the peroneal tendons laterally. She was not comfortable utilizing the lace up brace and still has some soreness with distance walking and pivoting. She has tolerated her meloxicam and denies locking or catching. Her knee is still doing reasonably well post viscosupplementation and she is continue with her exercise program..    I saw Susan Chen in the office on 6/9/2021 for follow-up on her knee osteoarthritis which is still doing fairly well following completion of her GELSYN-3 injection series on the left on 2/10/2021. Her more pressing complaint last visit was ongoing pain to her right ankle. She was found previously to have substantial tibiotalar arthritic changes with likely reactive peroneal tendinitis symptoms. She is work on home-based exercises and has been using her lace up brace. Her primary care physicians were concerned about her taking the meloxicam and stopped this which is resulted in some increasing pain which now seems to be more localized over the tibial talar joint. She does seem to be having less pain over the peroneal tendon is very compliant with her home-based exercise program.  She does not typically utilize orthotics. While her ankle symptoms have improved substantially they have effectively plateaued at 70 to 48% better. Denies sensations of loose bodies locking or catching. Her knee symptoms are under reasonable control.         Susan Chen was last seen in the office on 7/7/2021 and was continued on treatment for her degenerative right tibiotalar osteoarthritis recently completed some 3 injections in February 2021 for her knee. She presents back today stating that she is doing outstanding. Her knee symptoms are certainly tolerable and she got a 99.5% improvement in her ankle symptoms following her tibial talar injection on 7/7/2021 is no work on her home-based exercise program for her ankle and knee. She is utilizing her inserts and working on her stretching program and is quite pleased with her progress. Denies sensations of loose bodies locking or catching. Susan Chen was seen in the office on 7/28/2021 and was continued on conservative treatment for her degenerative right tibiotalar osteoarthritis. As noted previously she did complete her GELSYN-3 injection series to her left knee in February 2021 with fairly good responses to this. Overall she has gotten a little bit more achy recently with her knee but there is no interim history of fall or trauma. She is doing outstanding with regard to her ankle and has been utilizing her inserts and work on her home-based exercise program.  She denies mechanical or instability symptoms involving the knee as well. She is continued with her Voltaren gel use of her knee brace and her ankle brace. She has continued with her inserts. Medical History     Patient's medications, allergies, past medical, surgical, social and family histories were reviewed and updated as appropriate. Review of Systems  Pertinent items are noted in HPI  Review of systems reviewed from Patient History Form dated on 1/11/2021 and available in the patient's chart under the Media tab. Vital Signs  Vitals:       General Exam:     Constitutional: Patient is adequately groomed with no evidence of malnutrition  DTRs: Deep tendon reflexes are intact  Mental Status: The patient is oriented to time, place and person. The patient's mood and affect are appropriate.   Lymphatic: The lymphatic examination bilaterally reveals all areas to be without enlargement or induration. Vascular: Examination reveals no swelling or calf tenderness. Peripheral pulses are palpable and 2+. Neurological: The patient has good coordination. There is no weakness or sensory deficit. Knee Examination  Inspection: No high-grade deformity although she does have a trace knee joint effusion. She does have patellofemoral crepitation. Palpation: She has mild recurrent tenderness over the medial and lateral patellofemoral facet and does have now more mild pain reproduced with patellar grind testing and diffuse primarily anterior third medial joint line tenderness on the left with less lateral pain. Rang of Motion: She does have more mild t stiffness in the terminal 5 degrees of extension. Flexion to about 110 with patellofemoral crepitation. Hamstrings mildly tight. Strength: 4 out of 5 with flexion extension. Special Tests: Only mild pain with patellar grind testing and pain with medial Belén's. No high-grade click. Negative lateral Belén's. No instability. Screening hip testing reasonably benign. Skin: There are no rashes, ulcerations or lesions. Distal neurovascular exam is intact. Gait: Improved gait    Reflex symmetrically preserved    Additional Comments: Right ankle evaluation does reveal improved bilaterally with trace ankle joint effusion. She does have now just mild tenderness to the anterior lateral talar dome with mild tenderness over the ATFL. No high-grade syndesmotic or fibular metaphyseal tenderness. Mild discomfort with palpation of the posterior tibialis tendon and substantially improved tenderness over the peroneal's. .  No evidence of instability or tendon subluxation. . She demonstrates improvement in her ankle motion with some ongoing tightness to her Achilles and proving strength at 4+ out of 5 with eversion and dorsiflexion.   Effectively negative anterior drawer. Negative talar tilt and Deng's testing. Contralateral ankle exam is benign. Additional Examinations:  Contralateral Exam: Examination of the right knee reveals intact skin. There is no focal tenderness. The patient demonstrates full painless range of motion with regards to flexion and extension. Strength is 5/5 thorough out all planes. Ligamentous stability is grossly intact. Right Lower Extremity: Examination of the right lower extremity does not show any tenderness, deformity or injury. Range of motion is unremarkable. There is no gross instability. There are no rashes, ulcerations or lesions. Strength and tone are normal.  Left Lower Extremity: Examination of the left lower extremity does not show any tenderness, deformity or injury. Range of motion is unremarkable. There is no gross instability. There are no rashes, ulcerations or lesions. Strength and tone are normal.      Diagnostic Test Findings: Left knee AP and PA weightbearing sunrise and lateral films were reviewed from 1/11/2021 and does show significant medial compartment narrowing. This is not quite bone-on-bone. She does have advanced bone-on-bone patellofemoral arthropathy on the left. Right ankle AP lateral mortise films were reviewed from 5/19/2021 in the office which does show fairly prominent tibiotalar arthritic changes to the anterior lateral portion of the tibial talar joint. No evidence of acute fracture. Assessment : #1. Mild recurrent aggravation left knee significant medial compartment osteoarthritis with bone-on-bone patellofemoral arthropathy with left knee GELSYN-3 injection #1. #2. Nearly 4 months status post substantially improved aggravation right ankle significant osteoarthritis with resolved ankle synovitis and improved posttraumatic peroneal tendinitis and pes planus    Impression:  Encounter Diagnoses   Name Primary?     Primary osteoarthritis of left knee Yes    Chondromalacia patellae of left knee     Acute pain of left knee        Office Procedures:  Orders Placed This Encounter   Procedures    [id number] - [place] DRAIN/INJECT LARGE JOINT/BURSA       Treatment Plan:  Treatment options were discussed with Christopher Murphy. We did once again review her plain films and exam findings. Does have quite prominent anterior medial compartment osteoarthritis. Clinic at this time she has gotten a little bit more achy with regard to her left knee. After discussion of pros and cons of viscosupplementation. She did receive her first injection of GELSYN-3 to her left knee. She has had an excellent response to this in the past and this was performed using a standard prefilled 2 cc syringe. She will continue with her bracing and exercise program for her knee and her ankle which is also doing much better. She is continued with her power step inserts. Icing and activity modification was discussed. She will be seen back each of the next 2 weeks to finish up her GELSYN-3 injection series. She will contact us in the interim with questions or concerns. This dictation was performed with a verbal recognition program (DRAGON) and it was checked for errors. It is possible that there are still dictated errors within this office note. If so, please bring any errors to my attention for an addendum. All efforts were made to ensure that this office note is accurate.

## 2021-09-07 ENCOUNTER — OFFICE VISIT (OUTPATIENT)
Dept: ORTHOPEDIC SURGERY | Age: 76
End: 2021-09-07
Payer: MEDICARE

## 2021-09-07 VITALS — WEIGHT: 150 LBS | BODY MASS INDEX: 25.61 KG/M2 | HEIGHT: 64 IN

## 2021-09-07 DIAGNOSIS — G47.00 INSOMNIA, UNSPECIFIED TYPE: ICD-10-CM

## 2021-09-07 DIAGNOSIS — M25.532 LEFT WRIST PAIN: Primary | ICD-10-CM

## 2021-09-07 DIAGNOSIS — M79.642 HAND PAIN, LEFT: ICD-10-CM

## 2021-09-07 PROCEDURE — 1090F PRES/ABSN URINE INCON ASSESS: CPT | Performed by: ORTHOPAEDIC SURGERY

## 2021-09-07 PROCEDURE — 1123F ACP DISCUSS/DSCN MKR DOCD: CPT | Performed by: ORTHOPAEDIC SURGERY

## 2021-09-07 PROCEDURE — G8427 DOCREV CUR MEDS BY ELIG CLIN: HCPCS | Performed by: ORTHOPAEDIC SURGERY

## 2021-09-07 PROCEDURE — 1036F TOBACCO NON-USER: CPT | Performed by: ORTHOPAEDIC SURGERY

## 2021-09-07 PROCEDURE — 99203 OFFICE O/P NEW LOW 30 MIN: CPT | Performed by: ORTHOPAEDIC SURGERY

## 2021-09-07 PROCEDURE — G8399 PT W/DXA RESULTS DOCUMENT: HCPCS | Performed by: ORTHOPAEDIC SURGERY

## 2021-09-07 PROCEDURE — 4040F PNEUMOC VAC/ADMIN/RCVD: CPT | Performed by: ORTHOPAEDIC SURGERY

## 2021-09-07 PROCEDURE — G8417 CALC BMI ABV UP PARAM F/U: HCPCS | Performed by: ORTHOPAEDIC SURGERY

## 2021-09-07 RX ORDER — TRAZODONE HYDROCHLORIDE 50 MG/1
TABLET ORAL
Qty: 135 TABLET | Refills: 1 | Status: SHIPPED | OUTPATIENT
Start: 2021-09-07 | End: 2022-08-24

## 2021-09-07 RX ORDER — TRAMADOL HYDROCHLORIDE 50 MG/1
50 TABLET ORAL EVERY 6 HOURS PRN
Qty: 28 TABLET | Refills: 0 | Status: SHIPPED | OUTPATIENT
Start: 2021-09-07 | End: 2021-09-14

## 2021-09-07 NOTE — TELEPHONE ENCOUNTER
Refill request for TRAZODONE 50MG TABLETS medication.      Name of Charmaine      Last visit - 7-     Pending visit - 1-    Last refill - 3-1-2021      Medication Contract signed -   Min Simmons ran-         Additional Comments

## 2021-09-07 NOTE — PROGRESS NOTES
WRIST pain VISIT      HISTORY OF PRESENT ILLNESS    Alise Gibson is a 66 y.o. female who presents for evaluation of left wrist pain. She says she fell Saturday about 4 days ago and did not think she needed to go to the ER because of Covid issues there. She persisted in having pain and now is here feeling she cannot wait till tomorrow to be evaluated. She complains of grade 8/10 pain. She denies prior issues. She had fallen at home sustaining injury. ROS    Well-documented patient history form dated 9/7/2021 all other ROS negative except for above. Past Surgical history    Past Surgical History:   Procedure Laterality Date    BRONCHOSCOPY  9/11/14    Right Middle Lobe Volume Loss:Negative Pathology    CATARACT REMOVAL      bilateral    CHOLECYSTECTOMY  07/29/14    DaVinci assisted laparoscopic cholecystetomy    COLONOSCOPY  03/2003    negative    DILATION AND CURETTAGE OF UTERUS      x 2    EYE SURGERY Bilateral     catarct    HAND SURGERY Right 10/19/2017    dupuytren's disease excision including middle and ring finger    KNEE ARTHROSCOPY      MOHS SURGERY      squamous cell    SKIN BIOPSY  11/19/2012    squamous cell nose    TONSILLECTOMY AND ADENOIDECTOMY      UPPER GASTROINTESTINAL ENDOSCOPY N/A 10/27/2020    EGD DIAGNOSTIC ONLY performed by Pily Rivas MD at [address]    Past Medical History:   Diagnosis Date    Anxiety     Arthritis     Cancer ([facility] [address]     skin    Cervical radiculopathy 5/2012    LT. c-5 c-6 MRI    Depression     Dupuytren's contracture     Fractures     Hyperlipidemia     Hyperlipidemia     IBS (irritable bowel syndrome)     Insomnia     Migraine     past hx    Reflux     Shingles 11/2013    Left Face.     SOB (shortness of breath) 03/23/2017    GXT  Normal       Allergies    Allergies   Allergen Reactions    Amoxicillin Rash    Entex Pse [Pseudoephedrine-Guaifenesin]      gittery         Meds    Current Outpatient Medications   Medication Sig Dispense Refill    traZODone (DESYREL) 50 MG tablet TAKE 1 AND 1/2 TABLETS BY MOUTH EVERY NIGHT AT BEDTIME FOR SLEEP 135 tablet 1    traMADol (ULTRAM) 50 MG tablet Take 1 tablet by mouth every 6 hours as needed for Pain for up to 7 days. Intended supply: 7 days. Take lowest dose possible to manage pain 28 tablet 0    pravastatin (PRAVACHOL) 20 MG tablet TAKE 1 TABLET BY MOUTH EVERY EVENING FOR HIGH CHOLESTEROL 90 tablet 1    diclofenac sodium (VOLTAREN) 1 % GEL Apply 4 g topically 4 times daily as needed for Pain 100 g 3    FLUoxetine (PROZAC) 20 MG capsule TAKE 1 CAPSULE BY MOUTH EVERY DAY. For anxiety and Depression medicine. 90 capsule 1    buPROPion (WELLBUTRIN SR) 150 MG extended release tablet TAKE 1 TABLET BY MOUTH EVERY DAY FOR DEPRESSION 90 tablet 1    hydrocortisone 2.5 % cream Apply sparingly to affected area(s) up to bid prn for several days until scaling and redness have subsided. 30 g 0    omeprazole (PRILOSEC) 20 MG delayed release capsule Take 1 capsule by mouth 2 times daily (before meals) 180 capsule 1    Cetirizine HCl (ZYRTEC PO) Take 1 tablet by mouth daily. No current facility-administered medications for this visit.        Social    Social History     Socioeconomic History    Marital status:      Spouse name: Not on file    Number of children: Not on file    Years of education: Not on file    Highest education level: Not on file   Occupational History    Not on file   Tobacco Use    Smoking status: Never Smoker    Smokeless tobacco: Never Used   Vaping Use    Vaping Use: Never used   Substance and Sexual Activity    Alcohol use: No    Drug use: No    Sexual activity: Not on file   Other Topics Concern    Not on file   Social History Narrative    Not on file     Social Determinants of Health     Financial Resource Strain: Low Risk     Difficulty of Paying Living Expenses: Not hard at all   Food Insecurity: No Food Insecurity    Worried About [address] in the Last Year: Never true    Tom of Food in the Last Year: Never true   Transportation Needs:     Lack of Transportation (Medical):  Lack of Transportation (Non-Medical):    Physical Activity:     Days of Exercise per Week:     Minutes of Exercise per Session:    Stress:     Feeling of Stress :    Social Connections:     Frequency of Communication with Friends and Family:     Frequency of Social Gatherings with Friends and Family:     Attends [employer] Services:     Active Member of Clubs or Organizations:     Attends Club or Organization Meetings:     Marital Status:    Intimate Partner Violence:     Fear of Current or Ex-Partner:     Emotionally Abused:     Physically Abused:     Sexually Abused:        Family HISTORY    Family History   Problem Relation Age of Onset    High Blood Pressure Mother     Heart Disease Mother [de-identified]        CABG    Diabetes Mother     Heart Disease Maternal Grandmother     Breast Cancer Paternal Aunt     Breast Cancer Paternal Aunt     Breast Cancer Paternal Aunt     Breast Cancer Paternal Cousin     Breast Cancer Paternal Cousin        PHYSICAL EXAM    Vital Signs:  Ht 5' 4\" (1.626 m)   Wt 150 lb (68 kg)   BMI 25.75 kg/m²   General Appearance:  Normal body habitus. Alert and oriented to person, place, and time. Affect:  Normal.   Gait:  Normal. Good balance and coordination. Reflexes:  Intact. Pulses:  2+ radial pulses with brisk capillary refill to all fingers. Skin:  Normal.     Wrist Exam:  Hand dominance -right  Surface Exam -she has some swelling and some ecchymoses over the left long finger and some swelling over the wrist on the radial side dorsally. Neurologic Exam:  Reflexes:  Normal.   Tinels:  Normal.   Phalens:  Negative. Median Nerve Compression:  Negative. Thenar strength:  Normal.   Thenar atrophy:  Absent. Sensation:  Normal in the median, radial, and ulnar nerve distributions.    She is tender to palpation of the anatomic snuffbox. There is no crepitus but limitations of motion with pain  Wrist Motion Right Left   DF     PF     RD     CMC     UD     SUP     PRO     IMAGING STUDIES  X-rays 3 views of the left wrist reveal some arthritic changes throughout the hand and wrist but no evidence of acute fracture is seen. IMPRESSION    Acute left wrist pain/sprain with possible occult fracture of the scaphoid    PLAN    1. Conservative care options including physical therapy, NSAIDs, bracing, and activity modification were discussed. 2.  The indications for therapeutic injections were discussed. 3.  The indications for additional imaging studies were discussed. 4.  After considering the various options discussed, the patient elected to pursue a course that includes placing her in a Titan thumb brace because of swelling. She may benefit from casting for 2 weeks with amelia-ray at that time to ensure she does not have a scaphoid fracture of an occult nature. I will also call her in some tramadol for pain.

## 2021-09-08 ENCOUNTER — OFFICE VISIT (OUTPATIENT)
Dept: ORTHOPEDIC SURGERY | Age: 76
End: 2021-09-08
Payer: MEDICARE

## 2021-09-08 DIAGNOSIS — M25.562 ACUTE PAIN OF LEFT KNEE: ICD-10-CM

## 2021-09-08 DIAGNOSIS — S63.502A SPRAIN OF LEFT WRIST, INITIAL ENCOUNTER: ICD-10-CM

## 2021-09-08 DIAGNOSIS — M17.12 PRIMARY OSTEOARTHRITIS OF LEFT KNEE: Primary | ICD-10-CM

## 2021-09-08 DIAGNOSIS — M25.532 LEFT WRIST PAIN: ICD-10-CM

## 2021-09-08 DIAGNOSIS — M22.42 CHONDROMALACIA PATELLAE OF LEFT KNEE: ICD-10-CM

## 2021-09-08 PROCEDURE — 1123F ACP DISCUSS/DSCN MKR DOCD: CPT | Performed by: FAMILY MEDICINE

## 2021-09-08 PROCEDURE — G8399 PT W/DXA RESULTS DOCUMENT: HCPCS | Performed by: FAMILY MEDICINE

## 2021-09-08 PROCEDURE — 4040F PNEUMOC VAC/ADMIN/RCVD: CPT | Performed by: FAMILY MEDICINE

## 2021-09-08 PROCEDURE — 99214 OFFICE O/P EST MOD 30 MIN: CPT | Performed by: FAMILY MEDICINE

## 2021-09-08 PROCEDURE — G8428 CUR MEDS NOT DOCUMENT: HCPCS | Performed by: FAMILY MEDICINE

## 2021-09-08 PROCEDURE — G8417 CALC BMI ABV UP PARAM F/U: HCPCS | Performed by: FAMILY MEDICINE

## 2021-09-08 PROCEDURE — 1090F PRES/ABSN URINE INCON ASSESS: CPT | Performed by: FAMILY MEDICINE

## 2021-09-08 PROCEDURE — 1036F TOBACCO NON-USER: CPT | Performed by: FAMILY MEDICINE

## 2021-09-08 PROCEDURE — 20610 DRAIN/INJ JOINT/BURSA W/O US: CPT | Performed by: FAMILY MEDICINE

## 2021-09-08 PROCEDURE — L3908 WHO COCK-UP NONMOLDE PRE OTS: HCPCS | Performed by: FAMILY MEDICINE

## 2021-09-08 NOTE — PROGRESS NOTES
of an achy pain at this point and not so much the sharp stabbing pain she was having originally. She has tolerated her meloxicam and has been working on her home-based exercises. She would like to avoid arthroplasty as long as she can. She still has some difficulties with positional changes going up and down stairs and with sitting with her legs crossed. He is fairly stiff in the morning but loosens up as she walks. Denies locking catching or true instability symptoms. She does utilize her brace periodically. Caitlin Morales was last seen in the office on 2/10/2021 when we completed GELSYN-3 injections to her left knee. She presents back today stating that her knee symptoms are doing outstanding. She is at least 90% improved and is having much less pain with distance walking and prolonged standing. She still does stairs one at a time but states that her current symptoms are very tolerable currently. She has been working her home-based exercise program and is continue with her meloxicam.  Her greater concern today is of increasing pain about the anterior lateral aspect of her right ankle. She apparently was fishing down at [facility] in [facility] this past weekend on 5/15/2021 standing on a rocking bass boat and since that time she is having worsening pain about the anterior lateral aspect of her ankle which is quite substantial at 6-7 out of 10 with weightbearing. She has had some mild swelling. She has no recollection of inverting her ankle getting onto the boat. She is having rest pain at 3 out of 10 and up to about 6-7 out of 10 pain with weightbearing and pivoting in particular. She is concerned that her ankle pain will cause worsening pain on her contralateral left knee. She reports no sensations of loose bodies locking or catching and does have stiffness.   She is being seen today for orthopedic and sports consultation for review of her left knee arthritis and evaluation of a new onset of symptoms to her right ankle. Pradip Delarosa was last seen in the office on 5/19/2021 and was started on conservative treatment for her ankle. Her knee is actually doing reasonably well with a 95% improvement and she is working on her home-based exercises. We felt that she had aggravation of her right ankle tibiotalar osteoarthritis and synovitis and recommended physical therapy. She did not do this opting for home-based exercises online. She presents back today stating that her ankle is about 75% improved but now seems to be having more pain over the peroneal tendons laterally. She was not comfortable utilizing the lace up brace and still has some soreness with distance walking and pivoting. She has tolerated her meloxicam and denies locking or catching. Her knee is still doing reasonably well post viscosupplementation and she is continue with her exercise program..    I saw Pradip Delarosa in the office on 6/9/2021 for follow-up on her knee osteoarthritis which is still doing fairly well following completion of her GELSYN-3 injection series on the left on 2/10/2021. Her more pressing complaint last visit was ongoing pain to her right ankle. She was found previously to have substantial tibiotalar arthritic changes with likely reactive peroneal tendinitis symptoms. She is work on home-based exercises and has been using her lace up brace. Her primary care physicians were concerned about her taking the meloxicam and stopped this which is resulted in some increasing pain which now seems to be more localized over the tibial talar joint. She does seem to be having less pain over the peroneal tendon is very compliant with her home-based exercise program.  She does not typically utilize orthotics. While her ankle symptoms have improved substantially they have effectively plateaued at 70 to 80% better. Denies sensations of loose bodies locking or catching. Her knee symptoms are under reasonable control.         Pradip Delarosa was last seen in the office on 7/7/2021 and was continued on treatment for her degenerative right tibiotalar osteoarthritis recently completed some 3 injections in February 2021 for her knee. She presents back today stating that she is doing outstanding. Her knee symptoms are certainly tolerable and she got a 99.5% improvement in her ankle symptoms following her tibial talar injection on 7/7/2021 is no work on her home-based exercise program for her ankle and knee. She is utilizing her inserts and working on her stretching program and is quite pleased with her progress. Denies sensations of loose bodies locking or catching. Angelo Sheikh was seen in the office on 7/28/2021 and was continued on conservative treatment for her degenerative right tibiotalar osteoarthritis. As noted previously she did complete her GELSYN-3 injection series to her left knee in February 2021 with fairly good responses to this. Overall she has gotten a little bit more achy recently with her knee but there is no interim history of fall or trauma. She is doing outstanding with regard to her ankle and has been utilizing her inserts and work on her home-based exercise program.  She denies mechanical or instability symptoms involving the knee as well. She is continued with her Voltaren gel use of her knee brace and her ankle brace. She has continued with her inserts. When I saw Angelo Sheikh in the office on 9/1/2021 and was started on viscosupplementation with GELSYN-3 for her underlying left knee significant osteoarthritis. She is here today for her second GELSYN-3 injection stating that her knee symptoms are doing a little bit better. She does believe that Voltaren gel has also helped her degenerative right tibiotalar osteoarthritis and has been working on her exercise program and utilizing her inserts. She does utilize her knee brace occasionally.   She does have an additional injury to her left wrist when she apparently was getting up out of bed early in the evening on 9/5/2021 and advertently stepped on her dog which got up causing her to fall onto an outstretched left wrist.  She does not really recall pop or crack but did have immediate pain and swelling to develop ecchymosis not extending to the fingers. She did contact us in the office yesterday hoping to get seen but we are on the other side of [facility] and she was kindly evaluated by Dr. Christine Bustillo who placed her in a wrist splint given concerns of a possible wrist sprain versus occult snuffbox/scaphoid fracture. Fortunately she was only placed in a cock-up splint whereas Dr. Genrao Marrufo wanted her in a thumb spica splint. She does believe that the pain symptoms involving the wrist are getting worse. There is no associated numbness and tingling. Active use gripping and grasping is painful. Her plain films did not show any evidence of obvious displaced fracture over the distal radius but does show underlying carpal degenerative changes. Medical History     Patient's medications, allergies, past medical, surgical, social and family histories were reviewed and updated as appropriate. Review of Systems  Pertinent items are noted in HPI  Review of systems reviewed from Patient History Form dated on 1/11/2021 and available in the patient's chart under the Media tab. Vital Signs  There were no vitals filed for this visit. General Exam:     Constitutional: Patient is adequately groomed with no evidence of malnutrition  DTRs: Deep tendon reflexes are intact  Mental Status: The patient is oriented to time, place and person. The patient's mood and affect are appropriate. Lymphatic: The lymphatic examination bilaterally reveals all areas to be without enlargement or induration. Vascular: Examination reveals no swelling or calf tenderness. Peripheral pulses are palpable and 2+. Neurological: The patient has good coordination. There is no weakness or sensory deficit.     Knee Examination  Inspection: No high-grade deformity although she does have a trace knee joint effusion. She does have patellofemoral crepitation. Palpation: She has improving recurrent tenderness over the medial and lateral patellofemoral facet and does have now more mild pain reproduced with patellar grind testing and diffuse primarily anterior third medial joint line tenderness on the left with less lateral pain. Rang of Motion: She does have more mild stiffness in the terminal 5 degrees of extension. Flexion to about 110 with patellofemoral crepitation. Hamstrings mildly tight. Strength: 4 out of 5 with flexion extension. Special Tests: Only mild pain with patellar grind testing and pain with medial Belén's. No high-grade click. Negative lateral Belén's. No instability. Screening hip testing reasonably benign. Skin: There are no rashes, ulcerations or lesions. Distal neurovascular exam is intact. Gait: Improved gait    Reflex symmetrically preserved    Additional Comments: Right ankle evaluation does reveal improved bilaterally with trace ankle joint effusion. She does have now just mild tenderness to the anterior lateral talar dome with mild tenderness over the ATFL. No high-grade syndesmotic or fibular metaphyseal tenderness. Mild discomfort with palpation of the posterior tibialis tendon and substantially improved tenderness over the peroneal's. .  No evidence of instability or tendon subluxation. . She demonstrates improvement in her ankle motion with some ongoing tightness to her Achilles and proving strength at 4+ out of 5 with eversion and dorsiflexion. Effectively negative anterior drawer. Negative talar tilt and Deng's testing. Contralateral ankle exam is benign. Examination of her right wrist does feel degenerative changes over the first [facility] joint MCP and IP joints of the left hand.   She does have trace amounts of swelling with some palmar ecchymosis extending down into the hand. She does have considerable 8-9 out of 10 pain over both the radial metaphysis but also over the anatomic snuffbox. She does have a 50% reduction in wrist motion. Pain/weakness with radial deviation and extension. Decreased  strength secondary to pain. Negative Tinel's cubital carpal tunnel. Additional Examinations:  Contralateral Exam: Examination of the right knee reveals intact skin. There is no focal tenderness. The patient demonstrates full painless range of motion with regards to flexion and extension. Strength is 5/5 thorough out all planes. Ligamentous stability is grossly intact. Right Lower Extremity: Examination of the right lower extremity does not show any tenderness, deformity or injury. Range of motion is unremarkable. There is no gross instability. There are no rashes, ulcerations or lesions. Strength and tone are normal.  Left Lower Extremity: Examination of the left lower extremity does not show any tenderness, deformity or injury. Range of motion is unremarkable. There is no gross instability. There are no rashes, ulcerations or lesions. Strength and tone are normal.      Diagnostic Test Findings: Left knee AP and PA weightbearing sunrise and lateral films were reviewed from 1/11/2021 and does show significant medial compartment narrowing. This is not quite bone-on-bone. She does have advanced bone-on-bone patellofemoral arthropathy on the left. Right ankle AP lateral mortise films were reviewed from 5/19/2021 in the office which does show fairly prominent tibiotalar arthritic changes to the anterior lateral portion of the tibial talar joint. No evidence of acute fracture. Left wrist AP lateral bleak and scaphoid films were reviewed from Dr. Darian Prescott on 9/7/2021 and I do agree I do not see any evidence of obvious displaced fracture. Underlying carpal degenerative changes noted but no evidence of obvious displaced fracture. Assessment : #1. Improving recurrent aggravation left knee significant medial compartment osteoarthritis with bone-on-bone patellofemoral arthropathy with left knee GELSYN-3 injection #2. #2.  4+ months status post substantially improved aggravation right ankle significant osteoarthritis with resolved ankle synovitis and improved posttraumatic peroneal tendinitis and pes planus  #3.  3 days status post fall with likely unrehabilitated left wrist sprain with underlying wrist degenerative changes left first [facility] arthritis (rule out occult distal radius/occult scaphoid fracture)    Impression:  Encounter Diagnoses   Name Primary?  Primary osteoarthritis of left knee Yes    Chondromalacia patellae of left knee     Acute pain of left knee     Sprain of left wrist, initial encounter     Left wrist pain        Office Procedures:  Orders Placed This Encounter   Procedures    MRI WRIST LEFT WO CONTRAST     Standing Status:   Future     Standing Expiration Date:   9/8/2022     Scheduling Instructions:      [facility]      [address], [facility]            [id number]:      TIME AND DATE TBD      PLEASE CALL PATIENT ONCE APPROVED TO SCHEDULE       PUSH TO [employer] PACS SYSTEM            Remember that it may take several business days to pre-cert your MRI through your insurance. Our office will contact you as soon as we have the approval. We will not give any test results over the phone. Please call [phone] once you have your test day and time to schedule a follow up with Dr. Dane Kirkpatrick. Order Specific Question:   Reason for exam:     Answer:   R/O DISTAL RADIUS/SCAPHOID FRACTURE VS. SPRAIN    20610 - VT DRAIN/INJECT LARGE JOINT/BURSA    Yadira Cao Titan Wrist and Thumb Brace     Patient was prescribed a Yadira Cao Titan Wrist and Thumb Brace.   The left wrist and thumb will require stabilization / immobilization from this semi-rigid / rigid orthosis to improve their function. The orthosis will assist in protecting the affected area, provide functional support and facilitate healing. The patient was educated and fit by a healthcare professional with expert knowledge and specialization in brace application while under the direct supervision of the treating physician. Verbal and written instructions for the use of and application of this item were provided. They were instructed to contact the office immediately should the brace result in increased pain, decreased sensation, increased swelling or worsening of the condition. Treatment Plan:  Treatment options were discussed with Edith Witt. We did once again review her plain films and exam findings. Does have quite prominent anterior medial compartment osteoarthritis. Clinic at this time she has gotten a little bit more achy with regard to her left knee. After discussion of pros and cons of viscosupplementation. She did receive her second injection of GELSYN-3 to her left knee. She has had an excellent response to this in the past and this was performed using a standard prefilled 2 cc syringe. She will continue with her bracing and exercise program for her knee and her ankle which is also doing much better. She is continued with her power step inserts. Icing and activity modification was discussed. She did have an additional injury on 9/5/2021 and does appear to have an unrehabilitated left wrist sprain with aggravation underlying osteoarthritis but does have considerable tenderness over the radial metaphysis and anatomic snuffbox and as such I would like for her to have an MRI of the wrist to rule out occult distal radius/scaphoid fracture. She was converted from her cock up to a thumb spica splint to work on icing elevation and may use her Voltaren gel on her wrist as well.   We will see her back next week to finish up her GELSYN-3 injection series to her left knee and hopefully we can review her wrist MRI at that time as well. Icing and activity modification elevation to her wrist was recommended. She will contact us in interim with questions or concerns. This dictation was performed with a verbal recognition program (DRAGON) and it was checked for errors. It is possible that there are still dictated errors within this office note. If so, please bring any errors to my attention for an addendum. All efforts were made to ensure that this office note is accurate.

## 2021-09-10 ENCOUNTER — TELEPHONE (OUTPATIENT)
Dept: ORTHOPEDIC SURGERY | Age: 76
End: 2021-09-10

## 2021-09-10 NOTE — TELEPHONE ENCOUNTER
Left voicemail for patient that their MRI has been authorized and that they can call and schedule scan at their convenience. Also told them that they can call and schedule a f/u with Dr. Barbra Maria once they have MRI scheduled, leaving at least 2-3 days for our office to receive their results.

## 2021-09-15 ENCOUNTER — OFFICE VISIT (OUTPATIENT)
Dept: ORTHOPEDIC SURGERY | Age: 76
End: 2021-09-15
Payer: MEDICARE

## 2021-09-15 DIAGNOSIS — M22.42 CHONDROMALACIA PATELLAE OF LEFT KNEE: ICD-10-CM

## 2021-09-15 DIAGNOSIS — S63.502A SPRAIN OF LEFT WRIST, INITIAL ENCOUNTER: ICD-10-CM

## 2021-09-15 DIAGNOSIS — M25.532 LEFT WRIST PAIN: ICD-10-CM

## 2021-09-15 DIAGNOSIS — M25.562 ACUTE PAIN OF LEFT KNEE: ICD-10-CM

## 2021-09-15 DIAGNOSIS — M18.12 PRIMARY OSTEOARTHRITIS OF FIRST CARPOMETACARPAL JOINT OF LEFT HAND: ICD-10-CM

## 2021-09-15 DIAGNOSIS — M17.12 PRIMARY OSTEOARTHRITIS OF LEFT KNEE: Primary | ICD-10-CM

## 2021-09-15 PROCEDURE — G8417 CALC BMI ABV UP PARAM F/U: HCPCS | Performed by: FAMILY MEDICINE

## 2021-09-15 PROCEDURE — 1090F PRES/ABSN URINE INCON ASSESS: CPT | Performed by: FAMILY MEDICINE

## 2021-09-15 PROCEDURE — MISCCMC2 OTS BREG CMC THUMB GUARD: Performed by: FAMILY MEDICINE

## 2021-09-15 PROCEDURE — 1036F TOBACCO NON-USER: CPT | Performed by: FAMILY MEDICINE

## 2021-09-15 PROCEDURE — G8399 PT W/DXA RESULTS DOCUMENT: HCPCS | Performed by: FAMILY MEDICINE

## 2021-09-15 PROCEDURE — G8428 CUR MEDS NOT DOCUMENT: HCPCS | Performed by: FAMILY MEDICINE

## 2021-09-15 PROCEDURE — 1123F ACP DISCUSS/DSCN MKR DOCD: CPT | Performed by: FAMILY MEDICINE

## 2021-09-15 PROCEDURE — 99213 OFFICE O/P EST LOW 20 MIN: CPT | Performed by: FAMILY MEDICINE

## 2021-09-15 PROCEDURE — 20610 DRAIN/INJ JOINT/BURSA W/O US: CPT | Performed by: FAMILY MEDICINE

## 2021-09-15 PROCEDURE — 4040F PNEUMOC VAC/ADMIN/RCVD: CPT | Performed by: FAMILY MEDICINE

## 2021-09-15 RX ORDER — METHYLPREDNISOLONE 4 MG/1
TABLET ORAL
Qty: 21 KIT | Refills: 0 | Status: SHIPPED | OUTPATIENT
Start: 2021-09-15 | End: 2021-10-13

## 2021-09-15 NOTE — PROGRESS NOTES
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 Review of left wrist imaging    History of Present Illness:  Julia Tan is a 66 y.o. 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 Aileen Rowe who is being seen today for evaluation of ongoing pain to her left knee. She does have a history of significant patellofemoral arthropathy and knee osteoarthritis to her contralateral right knee treated with a one-time steroid injection by Dr. Sukumar Wise in 2015. She states that a week ago on about 1/4/2020 when she was kneeling down vacuuming some carpeted steps at home when she felt a pop primarily to the anterior portion of her left knee prompting today's visit. She has had a fairly consistent achy 5-6 out of 10 pain with her knee with pain with positional changes distance walking crossing her legs walking on uneven surfaces and going up and down stairs. She has taken some Excedrin ice and Aspercreme but this has not improved her symptoms. Initially she was having some night pain but this has improved. No active locking or catching. Her symptoms have not substantially improved over the last week prompting today's visit and she is being seen today for orthopedic and sports consultation with updated imaging. She is not having any instability or buckling symptoms. Carloz Pacheco was last seen in the office on 1/11/2021 and was diagnosed with significant bone-on-bone changes to the medial compartment of her left knee with patellofemoral arthropathy and knee synovitis.   Initially she got a substantial improvement following her steroid injection but overall this is worn out and she would like to consider viscosupplementation. It is more of an achy pain at this point and not so much the sharp stabbing pain she was having originally. She has tolerated her meloxicam and has been working on her home-based exercises. She would like to avoid arthroplasty as long as she can. She still has some difficulties with positional changes going up and down stairs and with sitting with her legs crossed. He is fairly stiff in the morning but loosens up as she walks. Denies locking catching or true instability symptoms. She does utilize her brace periodically. Shantel Gregg was last seen in the office on 2/10/2021 when we completed GELSYN-3 injections to her left knee. She presents back today stating that her knee symptoms are doing outstanding. She is at least 90% improved and is having much less pain with distance walking and prolonged standing. She still does stairs one at a time but states that her current symptoms are very tolerable currently. She has been working her home-based exercise program and is continue with her meloxicam.  Her greater concern today is of increasing pain about the anterior lateral aspect of her right ankle. She apparently was fishing down at [facility] in [facility] this past weekend on 5/15/2021 standing on a rocking bass boat and since that time she is having worsening pain about the anterior lateral aspect of her ankle which is quite substantial at 6-7 out of 10 with weightbearing. She has had some mild swelling. She has no recollection of inverting her ankle getting onto the boat. She is having rest pain at 3 out of 10 and up to about 6-7 out of 10 pain with weightbearing and pivoting in particular. She is concerned that her ankle pain will cause worsening pain on her contralateral left knee. She reports no sensations of loose bodies locking or catching and does have stiffness.   She is being seen today for orthopedic and sports consultation for review of her left knee arthritis and evaluation of a new onset of symptoms to her right ankle. Shantel Gregg was last seen in the office on 5/19/2021 and was started on conservative treatment for her ankle. Her knee is actually doing reasonably well with a 95% improvement and she is working on her home-based exercises. We felt that she had aggravation of her right ankle tibiotalar osteoarthritis and synovitis and recommended physical therapy. She did not do this opting for home-based exercises online. She presents back today stating that her ankle is about 75% improved but now seems to be having more pain over the peroneal tendons laterally. She was not comfortable utilizing the lace up brace and still has some soreness with distance walking and pivoting. She has tolerated her meloxicam and denies locking or catching. Her knee is still doing reasonably well post viscosupplementation and she is continue with her exercise program..    I saw Shantel Gregg in the office on 6/9/2021 for follow-up on her knee osteoarthritis which is still doing fairly well following completion of her GELSYN-3 injection series on the left on 2/10/2021. Her more pressing complaint last visit was ongoing pain to her right ankle. She was found previously to have substantial tibiotalar arthritic changes with likely reactive peroneal tendinitis symptoms. She is work on home-based exercises and has been using her lace up brace. Her primary care physicians were concerned about her taking the meloxicam and stopped this which is resulted in some increasing pain which now seems to be more localized over the tibial talar joint. She does seem to be having less pain over the peroneal tendon is very compliant with her home-based exercise program.  She does not typically utilize orthotics. While her ankle symptoms have improved substantially they have effectively plateaued at 70 to 61% better. Denies sensations of loose bodies locking or catching.   Her knee symptoms are under reasonable control. Adria Bales was last seen in the office on 7/7/2021 and was continued on treatment for her degenerative right tibiotalar osteoarthritis recently completed some 3 injections in February 2021 for her knee. She presents back today stating that she is doing outstanding. Her knee symptoms are certainly tolerable and she got a 99.5% improvement in her ankle symptoms following her tibial talar injection on 7/7/2021 is no work on her home-based exercise program for her ankle and knee. She is utilizing her inserts and working on her stretching program and is quite pleased with her progress. Denies sensations of loose bodies locking or catching. Adria Bales was seen in the office on 7/28/2021 and was continued on conservative treatment for her degenerative right tibiotalar osteoarthritis. As noted previously she did complete her GELSYN-3 injection series to her left knee in February 2021 with fairly good responses to this. Overall she has gotten a little bit more achy recently with her knee but there is no interim history of fall or trauma. She is doing outstanding with regard to her ankle and has been utilizing her inserts and work on her home-based exercise program.  She denies mechanical or instability symptoms involving the knee as well. She is continued with her Voltaren gel use of her knee brace and her ankle brace. She has continued with her inserts. When I saw Adria Bales in the office on 9/1/2021 and was started on viscosupplementation with GELSYN-3 for her underlying left knee significant osteoarthritis. She is here today for her second GELSYN-3 injection stating that her knee symptoms are doing a little bit better. She does believe that Voltaren gel has also helped her degenerative right tibiotalar osteoarthritis and has been working on her exercise program and utilizing her inserts. She does utilize her knee brace occasionally.   She does have an additional injury to her left wrist when she apparently was getting up out of bed early in the evening on 9/5/2021 and advertently stepped on her dog which got up causing her to fall onto an outstretched left wrist.  She does not really recall pop or crack but did have immediate pain and swelling to develop ecchymosis not extending to the fingers. She did contact us in the office yesterday hoping to get seen but we are on the other side of [facility] and she was kindly evaluated by Dr. Thao Duque who placed her in a wrist splint given concerns of a possible wrist sprain versus occult snuffbox/scaphoid fracture. Fortunately she was only placed in a cock-up splint whereas Dr. Nereyda Sosa wanted her in a thumb spica splint. She does believe that the pain symptoms involving the wrist are getting worse. There is no associated numbness and tingling. Active use gripping and grasping is painful. Her plain films did not show any evidence of obvious displaced fracture over the distal radius but does show underlying carpal degenerative changes. Jing Hawley was last seen in the office on 9/8/2021 and was continued on her GELSYN-3 injections for her underlying left knee significant osteoarthritis. She is here today for final GELSYN-3 injection series stating that her knee symptoms are feeling better. She is still doing stairs one at a time but does feel as if her overall pain and strength is improving. She does utilize her knee brace episodically. She did have a discussion with Dr. Mac Schneider who had been monitoring her creatinine and recommended that she hold off on using Voltaren gel with any consistency. She is now 10 days out from her fall with left wrist suspected sprain/capsulitis. She did have considerable pain and is doing better with the thumb spica splint but we did send her for an MRI to rule out occult scaphoid/radial metaphyseal fracture. This was obtained at [facility] on 9/11/2021 and did not show any evidence of occult fracture to the radius or scaphoid. There is diffuse posttraumatic capsulosynovitis with intercarpal degenerative changes soft tissue contusion. There is some inner third TFCC degeneration with a possible pinhole perforation but no scapholunate ligament or lunotriquetral ligament tearing. She did have severe first Aia 16 arthropathy with chronic volar beak ligament tearing and tendon sheath inflammation. Clinically she states that her wrist is doing better with use of her thumb spica splint we would rate her improvement at about 60 to 70% since her injury 10 days ago. Active use gripping grasping still can produce pain however. Her ankle symptoms have improved substantially as well and are tolerable. Medical History     Patient's medications, allergies, past medical, surgical, social and family histories were reviewed and updated as appropriate. Review of Systems  Pertinent items are noted in HPI  Review of systems reviewed from Patient History Form dated on 1/11/2021 and available in the patient's chart under the Media tab. Vital Signs  There were no vitals filed for this visit. General Exam:     Constitutional: Patient is adequately groomed with no evidence of malnutrition  DTRs: Deep tendon reflexes are intact  Mental Status: The patient is oriented to time, place and person. The patient's mood and affect are appropriate. Lymphatic: The lymphatic examination bilaterally reveals all areas to be without enlargement or induration. Vascular: Examination reveals no swelling or calf tenderness. Peripheral pulses are palpable and 2+. Neurological: The patient has good coordination. There is no weakness or sensory deficit. Knee Examination  Inspection: No high-grade deformity although she does have a trace knee joint effusion. She does have patellofemoral crepitation.     Palpation: She has improving recurrent tenderness over the medial and lateral patellofemoral facet and does have now more mild pain reproduced with patellar grind testing and diffuse primarily anterior third medial joint line tenderness on the left with less lateral pain. Rang of Motion: She does have more mild stiffness in the terminal 5 degrees of extension. Flexion to about 110 with patellofemoral crepitation. Hamstrings mildly tight. Strength: 4 out of 5 with flexion extension. Special Tests: Only mild pain with patellar grind testing and pain with medial Belén's. No high-grade click. Negative lateral Belén's. No instability. Screening hip testing reasonably benign. Skin: There are no rashes, ulcerations or lesions. Distal neurovascular exam is intact. Gait: Improved gait    Reflex symmetrically preserved    Additional Comments: Right ankle evaluation does reveal improved bilaterally without evident ankle joint effusion. She does have now just mild tenderness to the anterior lateral talar dome with mild tenderness over the ATFL. No high-grade syndesmotic or fibular metaphyseal tenderness. Mild discomfort with palpation of the posterior tibialis tendon and substantially improved tenderness over the peroneal's. .  No evidence of instability or tendon subluxation. . She demonstrates improvement in her ankle motion with some ongoing tightness to her Achilles and proving strength at 4+ out of 5 with eversion and dorsiflexion. Effectively negative anterior drawer. Negative talar tilt and Deng's testing. Contralateral ankle exam is benign. Examination of her right wrist does feel degenerative changes over the first [facility] joint MCP and IP joints of the left hand. She does have trace but improving amounts of swelling with resolving palmar ecchymosis extending down into the hand. She does have much less tenderness at 3-4 out of 10 pain over both the radial metaphysis but also over the anatomic snuffbox. She does have a 30-40 % reduction in wrist motion. Pain/weakness with radial deviation and extension.   Decreased  strength secondary to pain. Mild pain with CMC grind testing to the first ray. Negative Tinel's cubital carpal tunnel. Additional Examinations:  Contralateral Exam: Examination of the right knee reveals intact skin. There is no focal tenderness. The patient demonstrates full painless range of motion with regards to flexion and extension. Strength is 5/5 thorough out all planes. Ligamentous stability is grossly intact. Right Lower Extremity: Examination of the right lower extremity does not show any tenderness, deformity or injury. Range of motion is unremarkable. There is no gross instability. There are no rashes, ulcerations or lesions. Strength and tone are normal.  Left Lower Extremity: Examination of the left lower extremity does not show any tenderness, deformity or injury. Range of motion is unremarkable. There is no gross instability. There are no rashes, ulcerations or lesions. Strength and tone are normal.      Diagnostic Test Findings: Left knee AP and PA weightbearing sunrise and lateral films were reviewed from 1/11/2021 and does show significant medial compartment narrowing. This is not quite bone-on-bone. She does have advanced bone-on-bone patellofemoral arthropathy on the left. Right ankle AP lateral mortise films were reviewed from 5/19/2021 in the office which does show fairly prominent tibiotalar arthritic changes to the anterior lateral portion of the tibial talar joint. No evidence of acute fracture. Left wrist AP lateral bleak and scaphoid films were reviewed from Dr. Suhas Cox on 9/7/2021 and I do agree I do not see any evidence of obvious displaced fracture. Underlying carpal degenerative changes noted but no evidence of obvious displaced fracture.     Left wrist MRI obtained at [address] [place] [address] on 9/11/2021 is listed above  Narrative   Site: [facility] [facility] #: [id number] #: [id number] Procedure: MR Left Wrist joint w/o Contrast ; Reason for Exam: Sprain of left wrist. Rule out distal radius, scaphoid fracture vs. sprain. This document is confidential medical information.  Unauthorized disclosure or use of this information is prohibited by law. If you are not the intended recipient of this document, please advise us by calling immediately [phone].       [employer] [facility] RACHELLE [address]           Patient Name: Ignacio Mac   Case ID: [id number]   Patient : 1945   Referring Physician: Eligio Allen MD   Exam Date: 2021   Exam Description: MR Left Wrist joint w/o Contrast            HISTORY:  Sprain of left wrist.  Evaluate for distal radius, scaphoid fracture versus sprain.       TECHNICAL FACTORS:  Long- and short-axis fat- and water-weighted images were performed.       COMPARISON:  None.       FINDINGS:  Diffuse swelling circumferentially surrounds the wrist and extends to the hand.       Cyst present within the lunate and less so scaphoid, capitate, hamate and triquetrum.     Remodeled 1st CMC joint.  Volar beak ligament is chronically torn and scarred or stretched and    insufficient.  Flexor and extensor tendon sheath inflammation.  No tear.       Edema changes throughout the carpals.  Subtle edema changes within the distal radius.     Hyperemic marrow change from underlying capsulosynovitis favored over acute contusion although    a mixed pattern may be present particularly considering history of trauma.       Scapholunate ligament and lunotriquetral ligament are intact.  TFCC is degenerated.  This    primarily involves the inner-third.  Pinhole perforation suspected.       CONCLUSION:   1.  Diffuse capsulosynovitis throughout the radiocarpal and midcarpal joints and less so distal    radioulnar joint.  Traction- or impaction-related cyst and hyperemic marrow change scattered    throughout the carpals and less so distal radius.  Marrow changes in part on a degenerative    basis and likely in part from hyperemic change due to preexisting synovitis.  Superimposed    acute contusion may be contributory.  No fracture, AVN or mass. 2. Degeneration inner-third TFC.  Pinhole perforation suspected. 3. No scapholunate ligament or lunotriquetral ligament tear. 4. Severe arthrosis and remodeling 1st CMC joint.  Volar beak ligament is chronically torn and    scarred or stretched and insufficient. 5. Flexor and extensor tendon sheath inflammation.  No kristi tear or rkisti tenosynovitis.                             Thank you for the opportunity to provide your interpretation.               Uzair Lobo MD       A: ELISHA/audra 09/13/2021 2:23 PM   T:  09/13/2021 12:58 PM             Assessment : #1. Improving recurrent aggravation left knee significant medial compartment osteoarthritis with bone-on-bone patellofemoral arthropathy with left knee GELSYN-3 injection #3. #2.  4.5+ months status post substantially improved aggravation right ankle significant osteoarthritis with resolved ankle synovitis and improved posttraumatic peroneal tendinitis and pes planus  #3.  10 days status post fall with MRI documented posttraumatic left wrist capsule synovitis with spraining and aggravation of underlying intercarpal/first CMC osteoarthritis with tenosynovitis and spraining (MRI negative for occult fracture )    Impression:  Encounter Diagnoses   Name Primary?  Primary osteoarthritis of left knee Yes    Chondromalacia patellae of left knee     Acute pain of left knee     Sprain of left wrist, initial encounter     Left wrist pain     Primary osteoarthritis of first carpometacarpal joint of left hand        Office Procedures:  Orders Placed This Encounter   Procedures    [facility] Thumb Guard $20     Scheduling Instructions:      Patient was supplied a [employer] [facility] [place] Thumb Guard. This retail item was supplied to provide functional support and assist in protecting the affected area.               Verbal and written instructions for the use of and application of this item were provided. The patient was educated and fit by a healthcare professional with expert knowledge and specialization in brace application. They were instructed to contact the office immediately should the equipment       result in increased pain, decreased sensation, increased swelling or worsening of the condition.  Ambulatory referral to Occupational Therapy     Referral Priority:   Routine     Referral Type:   Eval and Treat     Referral Reason:   Specialty Services Required     Number of Visits Requested:   1  20610 - OR DRAIN/INJECT LARGE JOINT/BURSA       Treatment Plan:  Treatment options were discussed with Librado Mcgregor. We did once again review her plain films and exam findings. Does have quite prominent anterior medial compartment osteoarthritis. Clinially at this time she has noticed a moderate improvement overall in her left knee pain symptoms. After discussion of pros and cons of viscosupplementation. She did receive her third injection of GELSYN-3 to her left knee. She has had an excellent response to this in the past and this was performed using a standard prefilled 2 cc syringe. She will continue with her bracing and exercise program for her knee and her ankle which is also doing much better. She is continued with her power step inserts. Icing and activity modification was discussed. She did have an additional injury on 9/5/2021 and does appear to have an MRI documented unrehabilitated left wrist sprain with posttraumatic capsule synovitis aggravation of underlying intercarpal/first CMC arthropathy with tenosynovitis but thankfully no evidence of fracture. She was converted to a cock-up splint which she cannot utilize although we did give her a thumb guard type wrap to utilize and hopefully transition into as we start her in hand therapy. Her left wrist symptoms have improved least 50 to 60% over the last 10 days.   She has had some elevations in her creatinine and Dr. Blackburn Her was not comfortable with her being on even Voltaren gel and therefore we will place him on a Medrol Dosepak and she may supplement with Tylenol. We may try iontophoresis in hand therapy. We will see her back for her wrist in about 4 weeks. She will continue with her exercise program for her knee and ankle as well. She is aware that she can have repeat viscosupplementation to her left knee at 6-month intervals or interim steroid injections. She will contact us in the interim with questions or concerns. This dictation was performed with a verbal recognition program (DRAGON) and it was checked for errors. It is possible that there are still dictated errors within this office note. If so, please bring any errors to my attention for an addendum. All efforts were made to ensure that this office note is accurate.

## 2021-09-20 ENCOUNTER — HOSPITAL ENCOUNTER (OUTPATIENT)
Dept: OCCUPATIONAL THERAPY | Age: 76
Setting detail: THERAPIES SERIES
Discharge: HOME | End: 2021-09-20
Payer: MEDICARE

## 2021-09-20 PROCEDURE — 97165 OT EVAL LOW COMPLEX 30 MIN: CPT | Performed by: OCCUPATIONAL THERAPIST

## 2021-09-20 PROCEDURE — 97018 PARAFFIN BATH THERAPY: CPT | Performed by: OCCUPATIONAL THERAPIST

## 2021-09-20 PROCEDURE — 97110 THERAPEUTIC EXERCISES: CPT | Performed by: OCCUPATIONAL THERAPIST

## 2021-09-20 NOTE — PLAN OF CARE
history, allergies, and medications reviewed - see intake form. Comorbidities Affecting Functional Performance:     [x]Anxiety (F41.9)/Depression (F32.9) []Hypertension  []Diabetes Type 1(E10.65) or 2 (E11.65)     []CVA   []Rheumatoid Arthritis (M05.9)                     []Aherisclerosis  []Fibromyalgia (M79.7)                                 []Angina Pectoris  []Neuropathy(G60.9)                                    [x]Disc Pathology  [x]Osteoarthritis(M19.91)                               []Osteoporosis  []None                                                           []Morbid Obesity  []Other:                                                         []COPD    OBJECTIVE:   Date:  Hand Dominance:     [x]  Right    [] Left 9/20/2021     Objective Measures:    PAIN 2/10   Quick DASH 73%                   Digits tip to DPFC in cm Index:  Long:  Ring:  Small:   Thumb ROM MP 51  IP 46    R MP 52  IP 75   Thumb opposition  R: WNL  L: WNL   Thumb Radial/Palmar abd ROM R: 70  L: 70   Wrist ROM Ext/Flex R: 62/70  L: 60/65   Rad/Uln dev ROM R:  L:   Forearm ROM  Sup/pron R:  L:   Elbow ROM Ext/flex R:  L:   Shoulder Flex  Shoulder Abd  Shoulder IR/ER    Edema in cm circumf. MCPJs R:  L:   Edema in cm circumf.   Wrist R:  L:    strength in lbs R: 34.8  L: 11   Pinch Strengthin lbs: lat  R: 7.9  L: 2.1   Pinch Strength in lbs:  3 point R:  L:     MMT:     Observations:  (including splints, bandages, incisions, scars):    Sensation:  [x] No reported deficits  [] Intact to light touch    [] Villard Scarlet test completed, findings as noted:  [] Other:      Occupational Profile:  Home Enviroment: lives with  [x] spouse,  [] family,  [] alone,  [] significant other,   [] other:    Occupation/School: retired     Recreational Activities/Meaningful Interests: swimming, water aerobics, housework      Prior Level of Function: [x] Independent with ADLs/IADLs     [] Assistance needed (describe):      Functional weeks      GOALS:  Patient stated goal: improve strength    [] Progressing: [] Met: [] Not Met: [] Adjusted    Therapist goals for Patient:   Short Term Goals: To be achieved in: 2 weeks  1. Independent in HEP and progression per patient tolerance, in order to prevent re-injury. [] Progressing: [] Met: [] Not Met: [] Adjusted   2. Patient will have a decrease in pain to facilitate improvement in movement, function, and ADLs as indicated by Functional Deficits. [] Progressing: [] Met: [] Not Met: [] Adjusted    Long Term Goals to be achieved in 8 weeks (through 10/20/21), including patient directed goals to address patient identified performance deficits:  1) Pt to be independent in graded HEP progression with a good level of effort and compliance. [] Progressing: [] Met: [] Not Met: [] Adjusted   2) Pt to report a score of </= 20 % on the Quick DASH disability questionnaire for increased performance with carrying, moving, and handling objects. [] Progressing: [] Met: [] Not Met: [] Adjusted   3) Pt will demonstrate increased ROM to L wrist to 55 deg flexion for improved independence with housework. [] Progressing: [] Met: [] Not Met: [] Adjusted   4) Pt will demonstrate increased strength to L  to 30# for improved independence with opening things. [] Progressing: [] Met: [] Not Met: [] Adjusted   5) Pt will have a decrease in pain to 0-1/10 to facilitate water aerobics.   [] Progressing: [] Met: [] Not Met: [] Adjusted        OCCUPATIONAL THERAPY EVALUATION COMPLEXITY JUSTIFICATION:    [x] An occupational profile and medical/therapy history, which includes:   [x] a brief history including medical and/or therapy records relating to the     presenting problem   [] an expanded review of medical and/or therapy records and additional review     of physical, cognitive or psychosocial history related to current functional    performance   [] an extensive additional review of review of medical and/or therapy records   and physical, cognitive, or psychosocial history related to current    functional performance    [x] An assessment that identifies performance deficits (relating to physical, cognitive, or psychosocial skills) that result in activity limitations and/or participation restrictions:   [x] 1-3 performance deficits   [] 3-5 performance deficits   [] 5 or more performance deficits    [x] Clinical decision making of:   [x] low complexity, including analysis of occupational profile, data analysis from problem focused assessment, and consideration of a limited number of treatment options. No comorbidities affect occupational performance. No task modifications or assistance needed to complete evaluation. [] moderate complexity, including analysis of occupational profile, data analysis from detailed assessment and consideration of several treatment options. Comorbidities that affect occupational performance may be present. Minimal to moderate task modifications or assistance needed to complete assessment. [] high complexity, including analysis of occupational profile, analysis of data from comprehensive assessment and consideration of multiple treatment options. Multiple comorbidities present that affect occupational performance. Significant task modifications or assistance needed to complete assessment.     Evaluation Code:  [x] Low Complexity EVAL 60449 (typically 30 minutes face to face)  [] Mod Complexity EVAL 79798 (typically 45 minutes face to face)  [] High Complexity EVAL 92098 (typically 60 minutes face to face)    Electronically signed by:  Ximena Bailey  , [id number], [id number]

## 2021-09-20 NOTE — FLOWSHEET NOTE
[address] [facility], [address] [place]  Phone: [phone]  Fax [phone]    Occupational Therapy Treatment Note/ Progress Report:     Is this a Progress Report:     []  Yes  [x]  No      If Yes:  Date Range for reporting period:  Beginning 21  Ending 2021    Date:  2021  Patient Name:  Alfreda Ryder    :  1945  MRN: [id number]  Medical/Treatment Diagnosis Information:  · Diagnosis: L wrist sprain, L wrist pain M25.532 L thumb CMC OA   ·       Insurance/Certification information:   [facility]  W Bethany Kleber  Physician Information:  Referring Practitioner: Dr. Yevgeniy Rodriguez  Has the plan of care been signed (Y/N):        []  Yes  [x]  No     Visit # Insurance Allowable Auth Required   1  []  Yes []  No    From 21  to 2021  Date of Injury: 21 fell   Date of Surgery: na     Date of Patient follow up with Physician:      RESTRICTIONS/PRECAUTIONS: thumb splint PRN     Latex Allergy:  [x]? No      []? Yes                    Pacemaker:  [x]? No       []? Yes      Preferred Language for Healthcare:   [x]? English       []? other:      Functional Scale: 73% disability (Quick DASH)                            Date assessed:  2021     C-SSRS Triggered by Intake questionnaire (Past 2 wk assessment):    No, Questionnaire did not trigger screening.         SUBJECTIVE:   Patient reported deficits/history of current problem: fell onto her wrist      Pain Scale: 0/10 2/10              [x]? Constant                []?Intermittent              []?other:  Pain Location:  Base of thumb and volar , radial wrist   Easing factors: ice, pain med  Provocative factors: over use       [x]? Patient reported history, allergies, and medications reviewed - see intake form.        Comorbidities Affecting Functional Performance:             [x]? Anxiety (F41.9)/Depression (F32.9)           []? Hypertension  []? Diabetes Type 1(E10.65) or 2 (E11.65)     []? CVA   []? Rheumatoid Arthritis (M05.9)                     []?Aherisclerosis  []? Fibromyalgia (M79.7)                                 []?Angina Pectoris  []? Neuropathy(G60.9)                                    [x]? Disc Pathology  [x]? Osteoarthritis(M19.91)                               []?Osteoporosis  []? None                                                           []?Morbid Obesity  []? Other:                                                         []?COPD     OBJECTIVE:   Date:  Hand Dominance:     [x]? Right    []? Left 9/20/2021      Objective Measures:     PAIN 2/10   Quick DASH 73%                           Digits tip to DPFC in cm Index:  Long:  Ring:  Small:   Thumb ROM MP 51  IP 46     R MP 52  IP 75   Thumb opposition  R: WNL  L: WNL   Thumb Radial/Palmar abd ROM R: 70  L: 70   Wrist ROM Ext/Flex R: 62/70  L: 60/65   Rad/Uln dev ROM R:  L:   Forearm ROM  Sup/pron R:  L:   Elbow ROM Ext/flex R:  L:   Shoulder Flex  Shoulder Abd  Shoulder IR/ER     Edema in cm circumf. MCPJs R:  L:   Edema in cm circumf. Wrist R:  L:    strength in lbs R: 34.8  L: 11   Pinch Strengthin lbs: lat  R: 7.9  L: 2.1   Pinch Strength in lbs:  3 point R:  L:      MMT:      Observations:  (including splints, bandages, incisions, scars):           MODALITIES: 9/20/21      Fluidotherapy (65589)      Estim (74618/12970)      Paraffin (68237) 10'     US (33812)      Iontophoresis (68006)      Hot Pack      Cold Pack            INTERVENTIONS:      Therapeutic Exercise (67919)       AROM wrist and thumb all directions. Squeeze soft play matthias at home see media for HEP                        Therapeutic Activity (88064)                  Manual Therapy (78941)      (IASTM, Dry Needling, manual mobilization)            Neuromuscular Reeducation (39014)                  ADL Training (67926)                  HEP Training/Review See sheet(s)                 Splinting      Lcode:  Has pre ramakrishna soft thumb splint      Orthotic allowing for proper ROM for normal function with self care, reaching, carrying, lifting, house/yardwork, driving/computer work    ADL Training:  [] (56196) Provided self-care/home management training related to activities of daily living and compensatory training, and/or use of adaptive equipment      Charges:  Timed Code Treatment Minutes: 15   Total Treatment Minutes: 45   Worker's Comp: Time In/Time Out     [x] EVAL (LOW) 60111 (typically 20 minutes face-to-face)    [] EVAL (MOD) 61292 (typically 30 minutes face-to-face)  [] EVAL (HIGH) 61330 (typically 45 minutes face-to-face)  [] OT Re-eval (05399)       [x] Gerard [phone]) x  1    [] USPLW(42154)  [] NMR (21286) x      [] Estim (attended) (79352)   [] Manual ([phone]) x      [] US (79268)  [] TA () x      [x] Paraffin (38899)  [] ADL  ([phone]) x     [] Splint/L code:    [] Estim (unattended) [phone])  [] Fluidotherapy (26787)  [] DN 1-2 (32803)   [] DN 3+ ([phone])  [] Orthotic Mgmt, Subsequent Enc (79394)  [] Orthotic Mgmt & Training (68310)  [] Other:  GOALS:  Patient stated goal: improve strength    []? Progressing: []? Met: []? Not Met: []? Adjusted     Therapist goals for Patient:   Short Term Goals: To be achieved in: 2 weeks  1. Independent in HEP and progression per patient tolerance, in order to prevent re-injury. []? Progressing: []? Met: []? Not Met: []? Adjusted   2. Patient will have a decrease in pain to facilitate improvement in movement, function, and ADLs as indicated by Functional Deficits. []? Progressing: []? Met: []? Not Met: []? Adjusted     Long Term Goals to be achieved in 8 weeks (through 10/20/21), including patient directed goals to address patient identified performance deficits:  1) Pt to be independent in graded HEP progression with a good level of effort and compliance. []? Progressing: []? Met: []? Not Met: []?  Adjusted   2) Pt to report a score of </= 20 % on the Quick DASH disability questionnaire for increased performance with carrying, moving, and handling objects. []? Progressing: []? Met: []? Not Met: []? Adjusted   3) Pt will demonstrate increased ROM to L wrist to 55 deg flexion for improved independence with housework. []? Progressing: []? Met: []? Not Met: []? Adjusted   4) Pt will demonstrate increased strength to L  to 30# for improved independence with opening things. []? Progressing: []? Met: []? Not Met: []? Adjusted   5) Pt will have a decrease in pain to 0-1/10 to facilitate water aerobics. []? Progressing: []? Met: []? Not Met: []? Adjusted      Overall Progression Towards Functional Goals/Treatment Progress Update:  [] Patient is progressing as expected towards functional goals listed. [] Progression is slowed due to complexities/impairments listed. [] Progression has been slowed due to co-morbidities. [x] Plan just implemented, too soon to assess goals progression <30 days  [] Goals require adjustment due to lack of progress  [] Patient is not progressing as expected and requires additional follow up with physician  [] All goals are met  [] Other:     Prognosis for POC: [x] Good [] Fair  [] Poor    Patient requires continued skilled intervention: [x] Yes  [] No    Treatment/Activity Tolerance:  [x] Patient able to complete treatment  [] Patient limited by fatigue  [] Patient limited by pain    [] Patient limited by other medical complications  [] Other:                  PLAN: See eval  [] Continue per plan of care [] Alter current plan (see comments above)  [x] Plan of care initiated [] Hold pending MD visit [] Discharge    Electronically signed by:  Wilfredo Rosenberg, OT, OTR\L, [id number]       Note: If patient does not return for scheduled/ recommended follow up visits, this note will serve as a discharge from care along with most recent update on progress.

## 2021-09-23 ENCOUNTER — HOSPITAL ENCOUNTER (OUTPATIENT)
Dept: OCCUPATIONAL THERAPY | Age: 76
Setting detail: THERAPIES SERIES
Discharge: HOME | End: 2021-09-23
Payer: MEDICARE

## 2021-09-23 PROCEDURE — 97140 MANUAL THERAPY 1/> REGIONS: CPT | Performed by: OCCUPATIONAL THERAPIST

## 2021-09-23 PROCEDURE — L3913 HFO W/O JOINTS CF: HCPCS | Performed by: OCCUPATIONAL THERAPIST

## 2021-09-23 PROCEDURE — 97110 THERAPEUTIC EXERCISES: CPT | Performed by: OCCUPATIONAL THERAPIST

## 2021-09-23 PROCEDURE — 97018 PARAFFIN BATH THERAPY: CPT | Performed by: OCCUPATIONAL THERAPIST

## 2021-09-23 NOTE — FLOWSHEET NOTE
[address] [facility], [address] [place] [address] [place]  Phone: [phone]  Fax [phone]    Occupational Therapy Treatment Note/ Progress Report:     Is this a Progress Report:     []  Yes  [x]  No      If Yes:  Date Range for reporting period:  Beginning 21  Ending 2021    Date:  2021  Patient Name:  Kenneth Mcpherson    :  1945  MRN: [id number]  Medical/Treatment Diagnosis Information:  · Diagnosis: L wrist sprain, L wrist pain M25.532 L thumb CMC OA   ·       Insurance/Certification information:   [facility]  Physician Information:  Referring Practitioner: Dr. Martina Swenson  Has the plan of care been signed (Y/N):        []  Yes  [x]  No     Visit # Insurance Allowable Auth Required   2  []  Yes []  No    From 21  to 2021  Date of Injury: 21 fell   Date of Surgery: na     Date of Patient follow up with Physician:      RESTRICTIONS/PRECAUTIONS: thumb splint PRN     Latex Allergy:  [x]? No      []? Yes                    Pacemaker:  [x]? No       []? Yes      Preferred Language for Healthcare:   [x]? English       []? other:      Functional Scale: 73% disability (Quick DASH)                            Date assessed:  2021     C-SSRS Triggered by Intake questionnaire (Past 2 wk assessment):    No, Questionnaire did not trigger screening.         SUBJECTIVE: feeling slightly stronger. Patient reported deficits/history of current problem: fell onto her wrist      Pain Scale: 0/10 2/10              [x]? Constant                []?Intermittent              []?other:  Pain Location:  Base of thumb and volar , radial wrist   Easing factors: ice, pain med  Provocative factors: over use       [x]? Patient reported history, allergies, and medications reviewed - see intake form.        Comorbidities Affecting Functional Performance:             [x]? Anxiety (F41.9)/Depression (F32.9) []?Hypertension  []? Diabetes Type 1(E10.65) or 2 (E11.65)     []? CVA   []? Rheumatoid Arthritis (M05.9)                     []?Aherisclerosis  []? Fibromyalgia (M79.7)                                 []?Angina Pectoris  []? Neuropathy(G60.9)                                    [x]? Disc Pathology  [x]? Osteoarthritis(M19.91)                               []?Osteoporosis  []? None                                                           []?Morbid Obesity  []? Other:                                                         []?COPD     OBJECTIVE:   Date:  Hand Dominance:     [x]? Right    []? Left 9/20/2021 9/23/21    Objective Measures:      PAIN 2/10    Quick DASH 73%                                Digits tip to DPFC in cm Index:  Long:  Ring:  Small:    Thumb ROM MP 51  IP 46     R MP 52  IP 75    Thumb opposition  R: WNL  L: WNL    Thumb Radial/Palmar abd ROM R: 70  L: 70    Wrist ROM Ext/Flex R: 62/70  L: 60/65   L: 65/75   Rad/Uln dev ROM R:  L:    Forearm ROM  Sup/pron R:  L:    Elbow ROM Ext/flex R:  L:    Shoulder Flex  Shoulder Abd  Shoulder IR/ER      Edema in cm circumf. MCPJs R:  L:    Edema in cm circumf. Wrist R:  L:     strength in lbs R: 34.8  L: 11   L: 13.6   Pinch Strengthin lbs: lat  R: 7.9  L: 2.1    Pinch Strength in lbs:  3 point R:  L:       MMT:       Observations:  (including splints, bandages, incisions, scars):            MODALITIES: 9/20/21 9/23/21     Fluidotherapy (56638)      Estim (78134/87786)      Paraffin (03916) 10' 10'    US (89161)      Iontophoresis (40675)      Hot Pack      Cold Pack            INTERVENTIONS:      Therapeutic Exercise (87514)       AROM wrist and thumb all directions. Squeeze soft play matthias at home see media for HEP  X 10 each        Wrist AROM x 10     Roll and  putty   5'    Prayer stretch   X 10           Therapeutic Activity (69976)                  Manual Therapy (71453)  DTM thumb wrist and hand.  Gentle jt mob/correction of MP joint subluxation 10' (IASTM, Dry Needling, manual mobilization)            Neuromuscular Reeducation (13360)                  ADL Training (52390)                  HEP Training/Review See sheet(s)                 Splinting      Lcode: Has pre ramakrishna soft thumb splint  Fabricated hand based CMC splint with attempt to correct MP joint subluxation for part night time use. Orthotic Mgmt, Subsequent Enc (11984)      Orthotic Mgmt & Training (84218)            Other:        Therapeutic Exercise & NMR:  [x] (85607) Provided verbal/tactile cueing for activities related to strengthening, flexibility, endurance, ROM  for improvements in scapular, scapulothoracic and UE control with self care, reaching, carrying, lifting, house/yardwork, driving/computer work.    [] (32987) Provided verbal/tactile cueing for activities related to improving balance, coordination, kinesthetic sense, posture, motor skill, proprioception  to assist with  scapular, scapulothoracic and UE control with self care, reaching, carrying, lifting, house/yardwork, driving/computer work.     Therapeutic Activities & NMR:    [] (99700 or 16821) Provided verbal/tactile cueing for activities related to improving balance, coordination, kinesthetic sense, posture, motor skill, proprioception and motor activation to allow for proper function of scapular, scapulothoracic and UE control with self care, carrying, lifting, driving/computer work    Home Exercise Program:    [x] (67153) Reviewed/Progressed HEP activities related to strengthening, flexibility, endurance, ROM of scapular, scapulothoracic and UE control with self care, reaching, carrying, lifting, house/yardwork, driving/computer work  [] (57077) Reviewed/Progressed HEP activities related to improving balance, coordination, kinesthetic sense, posture, motor skill, proprioception of scapular, scapulothoracic and UE control with self care, reaching, carrying, lifting, house/yardwork, driving/computer work      Manual Treatments:  PROM / STM / Oscillations-Mobs:  G-I, II, III, IV (PA's, Inf., Post.)  [x] (41942) Provided manual therapy to mobilize soft tissue/joints of cervical/CT, scapular GHJ and UE for the purpose of modulating pain, promoting relaxation,  increasing ROM, reducing/eliminating soft tissue swelling/inflammation/restriction, improving soft tissue extensibility and allowing for proper ROM for normal function with self care, reaching, carrying, lifting, house/yardwork, driving/computer work    ADL Training:  [] (65076) Provided self-care/home management training related to activities of daily living and compensatory training, and/or use of adaptive equipment      Charges:  Timed Code Treatment Minutes: 23   Total Treatment Minutes: 40   Worker's Comp: Time In/Time Out     [] EVAL (LOW) 36567 (typically 20 minutes face-to-face)    [] EVAL (MOD) 50150 (typically 30 minutes face-to-face)  [] EVAL (HIGH) 26460 (typically 45 minutes face-to-face)  [] OT Re-eval (96568)       [x] Gerard ([id number]) x  1    [] EAZMF(14364)  [] NMR (83596) x      [] Estim (attended) (78031)   [x] Manual (83878 [facility]) x 1     [] US (70464)  [] TA (24906) x      [x] Paraffin (35831)  [] ADL  (01917) x     [x] Splint/L code:  hand based [facility]   [] Estim (unattended) ([phone])  [] Fluidotherapy (77702)  [] DN 1-2 (62946)   [] DN 3+ (98945)  [] Orthotic Mgmt, Subsequent Enc (02970)  [] Orthotic Mgmt & Training (27435)  [] Other:  GOALS:  Patient stated goal: improve strength    []? Progressing: []? Met: []? Not Met: []? Adjusted     Therapist goals for Patient:   Short Term Goals: To be achieved in: 2 weeks  1. Independent in HEP and progression per patient tolerance, in order to prevent re-injury. []? Progressing: []? Met: []? Not Met: []? Adjusted   2. Patient will have a decrease in pain to facilitate improvement in movement, function, and ADLs as indicated by Functional Deficits. []? Progressing: []? Met: []? Not Met: []?  Adjusted     Long Term Goals to be achieved in 8 weeks (through 10/20/21), including patient directed goals to address patient identified performance deficits:  1) Pt to be independent in graded HEP progression with a good level of effort and compliance. []? Progressing: []? Met: []? Not Met: []? Adjusted   2) Pt to report a score of </= 20 % on the Quick DASH disability questionnaire for increased performance with carrying, moving, and handling objects. []? Progressing: []? Met: []? Not Met: []? Adjusted   3) Pt will demonstrate increased ROM to L wrist to 55 deg flexion for improved independence with housework. []? Progressing: []? Met: []? Not Met: []? Adjusted   4) Pt will demonstrate increased strength to L  to 30# for improved independence with opening things. []? Progressing: []? Met: []? Not Met: []? Adjusted   5) Pt will have a decrease in pain to 0-1/10 to facilitate water aerobics. []? Progressing: []? Met: []? Not Met: []? Adjusted      Overall Progression Towards Functional Goals/Treatment Progress Update:  [] Patient is progressing as expected towards functional goals listed. [] Progression is slowed due to complexities/impairments listed. [] Progression has been slowed due to co-morbidities.   [x] Plan just implemented, too soon to assess goals progression <30 days  [] Goals require adjustment due to lack of progress  [] Patient is not progressing as expected and requires additional follow up with physician  [] All goals are met  [] Other:     Prognosis for POC: [x] Good [] Fair  [] Poor    Patient requires continued skilled intervention: [x] Yes  [] No    Treatment/Activity Tolerance:  [x] Patient able to complete treatment  [] Patient limited by fatigue  [] Patient limited by pain    [] Patient limited by other medical complications  [] Other:                  PLAN: See eval  [] Continue per plan of care [] Alter current plan (see comments above)  [x] Plan of care initiated [] Hold pending MD visit [] Discharge    Electronically signed by:  Jumana Olivo, OT, OTR\L, [id number]       Note: If patient does not return for scheduled/ recommended follow up visits, this note will serve as a discharge from care along with most recent update on progress.

## 2021-09-30 ENCOUNTER — HOSPITAL ENCOUNTER (OUTPATIENT)
Dept: OCCUPATIONAL THERAPY | Age: 76
Setting detail: THERAPIES SERIES
Discharge: HOME | End: 2021-09-30
Payer: MEDICARE

## 2021-09-30 PROCEDURE — 97140 MANUAL THERAPY 1/> REGIONS: CPT | Performed by: OCCUPATIONAL THERAPIST

## 2021-09-30 PROCEDURE — 97110 THERAPEUTIC EXERCISES: CPT | Performed by: OCCUPATIONAL THERAPIST

## 2021-09-30 NOTE — FLOWSHEET NOTE
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 Cynthia Colvin  Has the plan of care been signed (Y/N):        []  Yes  [x]  No     Visit # Insurance Allowable Auth Required   3  []  Yes []  No    From 21  to 2021  Date of Injury: 21 fell   Date of Surgery: na     Date of Patient follow up with Physician:      RESTRICTIONS/PRECAUTIONS: thumb splint PRN     Latex Allergy:  [x]? No      []? Yes                    Pacemaker:  [x]? No       []? Yes      Preferred Language for Healthcare:   [x]? English       []? other:      Functional Scale: 73% disability (Quick DASH)                            Date assessed:  2021     C-SSRS Triggered by Intake questionnaire (Past 2 wk assessment):    No, Questionnaire did not trigger screening.         SUBJECTIVE: It has been more painful. Instructed pt. To wear the custom splint every other night instead of every night. Patient reported deficits/history of current problem: fell onto her wrist      Pain Scale: 0/10 2/10              [x]? Constant                []?Intermittent              []?other:  Pain Location:  Base of thumb and volar , radial wrist   Easing factors: ice, pain med  Provocative factors: over use       [x]?  Patient reported history, allergies, and medications reviewed - see intake form.        Comorbidities Affecting Functional Performance: [x]?Anxiety (F41.9)/Depression (F32.9)           []? Hypertension  []? Diabetes Type 1(E10.65) or 2 (E11.65)     []? CVA   []? Rheumatoid Arthritis (M05.9)                     []?Aherisclerosis  []? Fibromyalgia (M79.7)                                 []?Angina Pectoris  []? Neuropathy(G60.9)                                    [x]? Disc Pathology  [x]? Osteoarthritis(M19.91)                               []?Osteoporosis  []? None                                                           []?Morbid Obesity  []? Other:                                                         []?COPD     OBJECTIVE:   Date:  Hand Dominance:     [x]? Right    []? Left 9/20/2021 9/23/21    Objective Measures:      PAIN 2/10    Quick DASH 73%                                Digits tip to DPFC in cm Index:  Long:  Ring:  Small:    Thumb ROM MP 51  IP 46     R MP 52  IP 75    Thumb opposition  R: WNL  L: WNL    Thumb Radial/Palmar abd ROM R: 70  L: 70    Wrist ROM Ext/Flex R: 62/70  L: 60/65   L: 65/75   Rad/Uln dev ROM R:  L:    Forearm ROM  Sup/pron R:  L:    Elbow ROM Ext/flex R:  L:    Shoulder Flex  Shoulder Abd  Shoulder IR/ER      Edema in cm circumf. MCPJs R:  L:    Edema in cm circumf. Wrist R:  L:     strength in lbs R: 34.8  L: 11   L: 13.6   Pinch Strengthin lbs: lat  R: 7.9  L: 2.1    Pinch Strength in lbs:  3 point R:  L:       MMT:       Observations:  (including splints, bandages, incisions, scars):            MODALITIES: 9/20/21 9/23/21 9/30/21    Fluidotherapy (21735)      Estim (89763/81613)      Paraffin (56229) 10' 10'    US (30252)      Iontophoresis (89725)      Hot Pack   10'   Cold Pack            INTERVENTIONS:      Therapeutic Exercise (60578)       AROM wrist and thumb all directions.  Squeeze soft play matthias at home see media for HEP  X 10 each        Wrist AROM x 10  X 10 each        Wrist AROM x 10     Roll and  putty   5' 5'   Prayer stretch   X 10  Power web red x 10       Yellow digiflex x 20 Therapeutic Activity (92346)                  Manual Therapy (70020)  DTM thumb wrist and hand. Gentle jt mob/correction of MP joint subluxation 10' Same 10'   (IASTM, Dry Needling, manual mobilization)            Neuromuscular Reeducation (10813)                  ADL Training (50629)                  HEP Training/Review See sheet(s)                 Splinting      Lcode: Has pre ramakrishna soft thumb splint  Fabricated hand based CMC splint with attempt to correct MP joint subluxation for part night time use. Orthotic Mgmt, Subsequent Enc (65308)      Orthotic Mgmt & Training (21597)            Other:        Therapeutic Exercise & NMR:  [x] (98516) Provided verbal/tactile cueing for activities related to strengthening, flexibility, endurance, ROM  for improvements in scapular, scapulothoracic and UE control with self care, reaching, carrying, lifting, house/yardwork, driving/computer work.    [] (79545) Provided verbal/tactile cueing for activities related to improving balance, coordination, kinesthetic sense, posture, motor skill, proprioception  to assist with  scapular, scapulothoracic and UE control with self care, reaching, carrying, lifting, house/yardwork, driving/computer work.     Therapeutic Activities & NMR:    [] (25674 or 07806) Provided verbal/tactile cueing for activities related to improving balance, coordination, kinesthetic sense, posture, motor skill, proprioception and motor activation to allow for proper function of scapular, scapulothoracic and UE control with self care, carrying, lifting, driving/computer work    Home Exercise Program:    [x] (09776) Reviewed/Progressed HEP activities related to strengthening, flexibility, endurance, ROM of scapular, scapulothoracic and UE control with self care, reaching, carrying, lifting, house/yardwork, driving/computer work  [] (73742) Reviewed/Progressed HEP activities related to improving balance, coordination, kinesthetic sense, posture, motor skill, proprioception of scapular, scapulothoracic and UE control with self care, reaching, carrying, lifting, house/yardwork, driving/computer work      Manual Treatments:  PROM / STM / Oscillations-Mobs:  G-I, II, III, IV (PA's, Inf., Post.)  [x] (31296) Provided manual therapy to mobilize soft tissue/joints of cervical/CT, scapular GHJ and UE for the purpose of modulating pain, promoting relaxation,  increasing ROM, reducing/eliminating soft tissue swelling/inflammation/restriction, improving soft tissue extensibility and allowing for proper ROM for normal function with self care, reaching, carrying, lifting, house/yardwork, driving/computer work    ADL Training:  [] (09126) Provided self-care/home management training related to activities of daily living and compensatory training, and/or use of adaptive equipment      Charges:  Timed Code Treatment Minutes: 30   Total Treatment Minutes: 40   Worker's Comp: Time In/Time Out     [] EVAL (LOW) 92723 (typically 20 minutes face-to-face)    [] EVAL (MOD) 48128 (typically 30 minutes face-to-face)  [] EVAL (HIGH) 25064 (typically 45 minutes face-to-face)  [] OT Re-eval (08018)       [x] Gerard [phone]) x  1    [] BIPGR(03307)  [] NMR (25118) x      [] Estim (attended) (12076)   [x] Manual ([phone]) x 1     [] US (83538)  [] TA (57550) x      [] Paraffin (42501)  [] ADL  (48213) x     [] Splint/L code:  hand based [facility]   [] Estim (unattended) [phone])  [] Fluidotherapy (16571)  [] DN 1-2 (15217)   [] DN 3+ (10052)  [] Orthotic Mgmt, Subsequent Enc (63386)  [] Orthotic Mgmt & Training (99041)  [] Other:  GOALS:  Patient stated goal: improve strength    []? Progressing: []? Met: []? Not Met: []? Adjusted     Therapist goals for Patient:   Short Term Goals: To be achieved in: 2 weeks  1. Independent in HEP and progression per patient tolerance, in order to prevent re-injury. []? Progressing: []? Met: []? Not Met: []? Adjusted   2.  Patient will have a decrease in pain to facilitate improvement in movement, function, and ADLs as indicated by Functional Deficits. []? Progressing: []? Met: []? Not Met: []? Adjusted     Long Term Goals to be achieved in 8 weeks (through 10/20/21), including patient directed goals to address patient identified performance deficits:  1) Pt to be independent in graded HEP progression with a good level of effort and compliance. []? Progressing: []? Met: []? Not Met: []? Adjusted   2) Pt to report a score of </= 20 % on the Quick DASH disability questionnaire for increased performance with carrying, moving, and handling objects. []? Progressing: []? Met: []? Not Met: []? Adjusted   3) Pt will demonstrate increased ROM to L wrist to 55 deg flexion for improved independence with housework. []? Progressing: []? Met: []? Not Met: []? Adjusted   4) Pt will demonstrate increased strength to L  to 30# for improved independence with opening things. []? Progressing: []? Met: []? Not Met: []? Adjusted   5) Pt will have a decrease in pain to 0-1/10 to facilitate water aerobics. []? Progressing: []? Met: []? Not Met: []? Adjusted      Overall Progression Towards Functional Goals/Treatment Progress Update:  [] Patient is progressing as expected towards functional goals listed. [] Progression is slowed due to complexities/impairments listed. [] Progression has been slowed due to co-morbidities.   [x] Plan just implemented, too soon to assess goals progression <30 days  [] Goals require adjustment due to lack of progress  [] Patient is not progressing as expected and requires additional follow up with physician  [] All goals are met  [] Other:     Prognosis for POC: [x] Good [] Fair  [] Poor    Patient requires continued skilled intervention: [x] Yes  [] No    Treatment/Activity Tolerance:  [x] Patient able to complete treatment  [] Patient limited by fatigue  [] Patient limited by pain    [] Patient limited by other medical complications  [] Other: PLAN: See eval  [] Continue per plan of care [] Alter current plan (see comments above)  [x] Plan of care initiated [] Hold pending MD visit [] Discharge    Electronically signed by:  Shaila Rojas OT, OTR\L, [id number]       Note: If patient does not return for scheduled/ recommended follow up visits, this note will serve as a discharge from care along with most recent update on progress.

## 2021-10-13 ENCOUNTER — OFFICE VISIT (OUTPATIENT)
Dept: ORTHOPEDIC SURGERY | Age: 76
End: 2021-10-13
Payer: MEDICARE

## 2021-10-13 ENCOUNTER — HOSPITAL ENCOUNTER (OUTPATIENT)
Dept: OCCUPATIONAL THERAPY | Age: 76
Setting detail: THERAPIES SERIES
Discharge: HOME | End: 2021-10-13
Payer: MEDICARE

## 2021-10-13 VITALS — HEIGHT: 64 IN | BODY MASS INDEX: 25.27 KG/M2 | WEIGHT: 148 LBS

## 2021-10-13 DIAGNOSIS — S63.502D SPRAIN OF LEFT WRIST, SUBSEQUENT ENCOUNTER: ICD-10-CM

## 2021-10-13 DIAGNOSIS — M25.532 LEFT WRIST PAIN: ICD-10-CM

## 2021-10-13 DIAGNOSIS — M18.12 PRIMARY OSTEOARTHRITIS OF FIRST CARPOMETACARPAL JOINT OF LEFT HAND: ICD-10-CM

## 2021-10-13 DIAGNOSIS — M19.072 PRIMARY OSTEOARTHRITIS OF LEFT ANKLE: ICD-10-CM

## 2021-10-13 DIAGNOSIS — M22.42 CHONDROMALACIA PATELLAE OF LEFT KNEE: ICD-10-CM

## 2021-10-13 DIAGNOSIS — M17.12 PRIMARY OSTEOARTHRITIS OF LEFT KNEE: Primary | ICD-10-CM

## 2021-10-13 DIAGNOSIS — M25.562 ACUTE PAIN OF LEFT KNEE: ICD-10-CM

## 2021-10-13 PROBLEM — S63.502A SPRAIN OF LEFT WRIST: Status: ACTIVE | Noted: 2021-10-13

## 2021-10-13 PROCEDURE — 1123F ACP DISCUSS/DSCN MKR DOCD: CPT | Performed by: FAMILY MEDICINE

## 2021-10-13 PROCEDURE — G8417 CALC BMI ABV UP PARAM F/U: HCPCS | Performed by: FAMILY MEDICINE

## 2021-10-13 PROCEDURE — 1090F PRES/ABSN URINE INCON ASSESS: CPT | Performed by: FAMILY MEDICINE

## 2021-10-13 PROCEDURE — 97110 THERAPEUTIC EXERCISES: CPT | Performed by: OCCUPATIONAL THERAPIST

## 2021-10-13 PROCEDURE — 4040F PNEUMOC VAC/ADMIN/RCVD: CPT | Performed by: FAMILY MEDICINE

## 2021-10-13 PROCEDURE — G8484 FLU IMMUNIZE NO ADMIN: HCPCS | Performed by: FAMILY MEDICINE

## 2021-10-13 PROCEDURE — 99213 OFFICE O/P EST LOW 20 MIN: CPT | Performed by: FAMILY MEDICINE

## 2021-10-13 PROCEDURE — G8399 PT W/DXA RESULTS DOCUMENT: HCPCS | Performed by: FAMILY MEDICINE

## 2021-10-13 PROCEDURE — G8428 CUR MEDS NOT DOCUMENT: HCPCS | Performed by: FAMILY MEDICINE

## 2021-10-13 PROCEDURE — 97140 MANUAL THERAPY 1/> REGIONS: CPT | Performed by: OCCUPATIONAL THERAPIST

## 2021-10-13 PROCEDURE — 1036F TOBACCO NON-USER: CPT | Performed by: FAMILY MEDICINE

## 2021-10-13 NOTE — FLOWSHEET NOTE
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 Sandra Carmona  Has the plan of care been signed (Y/N):        []  Yes  [x]  No     Visit # Insurance Allowable Auth Required   4  []  Yes []  No    From 21  to 10/13/2021  Date of Injury: 21 fell   Date of Surgery: na     Date of Patient follow up with Physician:      RESTRICTIONS/PRECAUTIONS: thumb splint PRN     Latex Allergy:  [x]? No      []? Yes                    Pacemaker:  [x]? No       []? Yes      Preferred Language for Healthcare:   [x]? English       []? other:      Functional Scale: 73% disability (Quick DASH)                            Date assessed:  2021     C-SSRS Triggered by Intake questionnaire (Past 2 wk assessment):    No, Questionnaire did not trigger screening.         SUBJECTIVE: The flexibility is better. Patient reported deficits/history of current problem: fell onto her wrist      Pain Scale: 0/10 2/10              [x]? Constant                []?Intermittent              []?other:  Pain Location:  Base of thumb and volar , radial wrist   Easing factors: ice, pain med  Provocative factors: over use       [x]? Patient reported history, allergies, and medications reviewed - see intake form.        Comorbidities Affecting Functional Performance:             [x]? Anxiety (F41.9)/Depression (F32.9) []?Hypertension  []? Diabetes Type 1(E10.65) or 2 (E11.65)     []? CVA   []? Rheumatoid Arthritis (M05.9)                     []?Aherisclerosis  []? Fibromyalgia (M79.7)                                 []?Angina Pectoris  []? Neuropathy(G60.9)                                    [x]? Disc Pathology  [x]? Osteoarthritis(M19.91)                               []?Osteoporosis  []? None                                                           []?Morbid Obesity  []? Other:                                                         []?COPD     OBJECTIVE:   Date:  Hand Dominance:     [x]? Right    []? Left 9/20/2021    9/23/21  10/13/21    Objective Measures:       PAIN 2/10  2/10    Quick DASH 73%  32%                                   Digits tip to DPFC in cm Index:  Long:  Ring:  Small:     Thumb ROM MP 51  IP 46     R MP 52  IP 75     Thumb opposition  R: WNL  L: WNL     Thumb Radial/Palmar abd ROM R: 70  L: 70     Wrist ROM Ext/Flex R: 62/70  L: 60/65   L: 65/75 WNL   Rad/Uln dev ROM R:  L:     Forearm ROM  Sup/pron R:  L:     Elbow ROM Ext/flex R:  L:     Shoulder Flex  Shoulder Abd  Shoulder IR/ER       Edema in cm circumf. MCPJs R:  L:     Edema in cm circumf. Wrist R:  L:      strength in lbs R: 34.8  L: 11   L: 13.6   L: 16.9   Pinch Strengthin lbs: lat  R: 7.9  L: 2.1     Pinch Strength in lbs:  3 point R:  L:        MMT:        Observations:  (including splints, bandages, incisions, scars):             MODALITIES: 9/20/21  9/23/21  9/30/21  10/13/21    Fluidotherapy (56197)       Estim (97965/20155)       Paraffin (42052) 10' 10'     US (55757)       Iontophoresis (20904)       Hot Pack   10' 10'   Cold Pack              INTERVENTIONS:       Therapeutic Exercise (43333)        AROM wrist and thumb all directions.  Squeeze soft play matthias at home see media for HEP  X 10 each        Wrist AROM x 10  X 10 each        Wrist AROM x 10      Roll and  putty   5' 5' 5'   Prayer stretch   X 10  Power web red x 10  X 10 improving balance, coordination, kinesthetic sense, posture, motor skill, proprioception of scapular, scapulothoracic and UE control with self care, reaching, carrying, lifting, house/yardwork, driving/computer work      Manual Treatments:  PROM / STM / Oscillations-Mobs:  G-I, II, III, IV (PA's, Inf., [place].)  [x] (72401) Provided manual therapy to mobilize soft tissue/joints of cervical/CT, scapular GHJ and UE for the purpose of modulating pain, promoting relaxation,  increasing ROM, reducing/eliminating soft tissue swelling/inflammation/restriction, improving soft tissue extensibility and allowing for proper ROM for normal function with self care, reaching, carrying, lifting, house/yardwork, driving/computer work    ADL Training:  [] (24400) Provided self-care/home management training related to activities of daily living and compensatory training, and/or use of adaptive equipment      Charges:  Timed Code Treatment Minutes: 30   Total Treatment Minutes: 40   Worker's Comp: Time In/Time Out     [] EVAL (LOW) 90438 (typically 20 minutes face-to-face)    [] EVAL (MOD) 64404 (typically 30 minutes face-to-face)  [] EVAL (HIGH) 33151 (typically 45 minutes face-to-face)  [] OT Re-eval (04230)       [x] Gerard [phone]) x  1    [] LLIIB(46689)  [] NMR (72192) x      [] Estim (attended) (08916)   [x] Manual ([phone]) x 1     [] US (04425)  [] TA (39997) x      [] Paraffin (63068)  [] ADL  (90283) x     [] Splint/L code:  hand based Aia 16   [] Estim (unattended) ([phone])  [] Fluidotherapy (56686)  [] DN 1-2 (23623)   [] DN 3+ (92783)  [] Orthotic Mgmt, Subsequent Enc (30121)  [] Orthotic Mgmt & Training (13758)  [] Other:  GOALS:  Patient stated goal: improve strength    []? Progressing: [x]? Met: []? Not Met: []? Adjusted     Therapist goals for Patient:   Short Term Goals: To be achieved in: 2 weeks  1. Independent in HEP and progression per patient tolerance, in order to prevent re-injury. []? Progressing: [x]?  Met: []? Not Met: []? Adjusted   2. Patient will have a decrease in pain to facilitate improvement in movement, function, and ADLs as indicated by Functional Deficits. []? Progressing: [x]? Met: []? Not Met: []? Adjusted     Long Term Goals to be achieved in 8 weeks (through 10/20/21), including patient directed goals to address patient identified performance deficits:  1) Pt to be independent in graded HEP progression with a good level of effort and compliance. []? Progressing: [x]? Met: []? Not Met: []? Adjusted   2) Pt to report a score of </= 20 % on the Quick DASH disability questionnaire for increased performance with carrying, moving, and handling objects. []? Progressing: []? Met: []? Not Met: []? Adjusted   3) Pt will demonstrate increased ROM to L wrist to 55 deg flexion for improved independence with housework. []? Progressing: [x]? Met: []? Not Met: []? Adjusted   4) Pt will demonstrate increased strength to L  to 30# for improved independence with opening things. []? Progressing: []? Met: [x]? Not Met: []? Adjusted   5) Pt will have a decrease in pain to 0-1/10 to facilitate water aerobics. []? Progressing: []? Met: [x]? Not Met: []? Adjusted      Overall Progression Towards Functional Goals/Treatment Progress Update:  [] Patient is progressing as expected towards functional goals listed. [] Progression is slowed due to complexities/impairments listed. [] Progression has been slowed due to co-morbidities.   [x] Plan just implemented, too soon to assess goals progression <30 days  [] Goals require adjustment due to lack of progress  [] Patient is not progressing as expected and requires additional follow up with physician  [] All goals are met  [] Other:     Prognosis for POC: [x] Good [] Fair  [] Poor    Patient requires continued skilled intervention: [x] Yes  [] No    Treatment/Activity Tolerance:  [x] Patient able to complete treatment  [] Patient limited by fatigue  [] Patient limited by pain    [] Patient limited by other medical complications  [] Other:                  PLAN: See eval  [] Continue per plan of care [] Alter current plan (see comments above)  [x] Plan of care initiated [] Hold pending MD visit [x] Discharge per pt. Request     Electronically signed by:  Abby King, OT, OTR\L, [id number]       Note: If patient does not return for scheduled/ recommended follow up visits, this note will serve as a discharge from care along with most recent update on progress.

## 2021-10-13 NOTE — PROGRESS NOTES
Chief Complaint  Knee Pain (CK LEFT KNEE) and Wrist Pain (CK LEFT WRIST)      Follow-up  left knee pain with with underlying significant left knee osteoarthritis status post completion of GELSYN-3 15 2021 with history of right ankle tibiotalar osteoarthritis with synovitis    FU left wrist pain status post fall 9/5/2021 with MRI documented left wrist capsulitis with spraining and first Aia 16 osteoarthritis    History of Present Illness:  Shari Carbajal is a 64 y.o. 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 Brandee Rowe who is being seen today for evaluation of ongoing pain to her left knee. She does have a history of significant patellofemoral arthropathy and knee osteoarthritis to her contralateral right knee treated with a one-time steroid injection by Dr. Theron Hickman in 2015. She states that a week ago on about 1/4/2020 when she was kneeling down vacuuming some carpeted steps at home when she felt a pop primarily to the anterior portion of her left knee prompting today's visit. She has had a fairly consistent achy 5-6 out of 10 pain with her knee with pain with positional changes distance walking crossing her legs walking on uneven surfaces and going up and down stairs. She has taken some Excedrin ice and Aspercreme but this has not improved her symptoms. Initially she was having some night pain but this has improved. No active locking or catching. Her symptoms have not substantially improved over the last week prompting today's visit and she is being seen today for orthopedic and sports consultation with updated imaging. She is not having any instability or buckling symptoms. Bubba Gomez was last seen in the office on 1/11/2021 and was diagnosed with significant bone-on-bone changes to the medial compartment of her left knee with patellofemoral arthropathy and knee synovitis.   Initially she got a substantial improvement following her steroid injection and sports consultation for review of her left knee arthritis and evaluation of a new onset of symptoms to her right ankle. Salomon Haddad was last seen in the office on 5/19/2021 and was started on conservative treatment for her ankle. Her knee is actually doing reasonably well with a 95% improvement and she is working on her home-based exercises. We felt that she had aggravation of her right ankle tibiotalar osteoarthritis and synovitis and recommended physical therapy. She did not do this opting for home-based exercises online. She presents back today stating that her ankle is about 75% improved but now seems to be having more pain over the peroneal tendons laterally. She was not comfortable utilizing the lace up brace and still has some soreness with distance walking and pivoting. She has tolerated her meloxicam and denies locking or catching. Her knee is still doing reasonably well post viscosupplementation and she is continue with her exercise program..    I saw Salomon Haddad in the office on 6/9/2021 for follow-up on her knee osteoarthritis which is still doing fairly well following completion of her GELSYN-3 injection series on the left on 2/10/2021. Her more pressing complaint last visit was ongoing pain to her right ankle. She was found previously to have substantial tibiotalar arthritic changes with likely reactive peroneal tendinitis symptoms. She is work on home-based exercises and has been using her lace up brace. Her primary care physicians were concerned about her taking the meloxicam and stopped this which is resulted in some increasing pain which now seems to be more localized over the tibial talar joint. She does seem to be having less pain over the peroneal tendon is very compliant with her home-based exercise program.  She does not typically utilize orthotics. While her ankle symptoms have improved substantially they have effectively plateaued at 70 to 82% better.   Denies sensations of loose bodies locking or catching. Her knee symptoms are under reasonable control. Salomon Haddad was last seen in the office on 7/7/2021 and was continued on treatment for her degenerative right tibiotalar osteoarthritis recently completed some 3 injections in February 2021 for her knee. She presents back today stating that she is doing outstanding. Her knee symptoms are certainly tolerable and she got a 99.5% improvement in her ankle symptoms following her tibial talar injection on 7/7/2021 is no work on her home-based exercise program for her ankle and knee. She is utilizing her inserts and working on her stretching program and is quite pleased with her progress. Denies sensations of loose bodies locking or catching. Salomon Haddad was seen in the office on 7/28/2021 and was continued on conservative treatment for her degenerative right tibiotalar osteoarthritis. As noted previously she did complete her GELSYN-3 injection series to her left knee in February 2021 with fairly good responses to this. Overall she has gotten a little bit more achy recently with her knee but there is no interim history of fall or trauma. She is doing outstanding with regard to her ankle and has been utilizing her inserts and work on her home-based exercise program.  She denies mechanical or instability symptoms involving the knee as well. She is continued with her Voltaren gel use of her knee brace and her ankle brace. She has continued with her inserts. When I saw Salomon Haddad in the office on 9/1/2021 and was started on viscosupplementation with GELSYN-3 for her underlying left knee significant osteoarthritis. She is here today for her second GELSYN-3 injection stating that her knee symptoms are doing a little bit better. She does believe that Voltaren gel has also helped her degenerative right tibiotalar osteoarthritis and has been working on her exercise program and utilizing her inserts. She does utilize her knee brace occasionally. evidence of occult fracture to the radius or scaphoid. There is diffuse posttraumatic capsulosynovitis with intercarpal degenerative changes soft tissue contusion. There is some inner third TFCC degeneration with a possible pinhole perforation but no scapholunate ligament or lunotriquetral ligament tearing. She did have severe first Aia 16 arthropathy with chronic volar beak ligament tearing and tendon sheath inflammation. Clinically she states that her wrist is doing better with use of her thumb spica splint we would rate her improvement at about 60 to 70% since her injury 10 days ago. Active use gripping grasping still can produce pain however. Her ankle symptoms have improved substantially as well and are tolerable. Pierce Christy was last seen in the office on 9/15/2021 when we finished up viscosupplementation to her left knee with GELSYN-3. She is done very well with this and still has soreness and achiness at times if she walks too far but only in the range of 2-3 out of 10. No rest or night pain. She has been working her exercise program.  She is now about 5-1/2 weeks out from her fall with posttraumatic MRI documented left wrist capsular sprain with synovitis and TFCC fraying. She is done very well with hand therapy after 4 sessions and is 90+ percent improved. Her motion is doing outstanding and she is working on strengthening. Her ankle is reasonably stable as well and she is not requiring splinting. She is overall pleased with her progress. She did talk to Dr. Antwon Swanson about use of her Voltaren gel with her kidneys he felt that it was best for her to be off this and she does take Tylenol only occasionally. Medical History     Patient's medications, allergies, past medical, surgical, social and family histories were reviewed and updated as appropriate.     Review of Systems  Pertinent items are noted in HPI  Review of systems reviewed from Patient History Form dated on 1/11/2021 and available in the tenderness. Mild discomfort with palpation of the posterior tibialis tendon and substantially improved tenderness over the peroneal's. .  No evidence of instability or tendon subluxation. . She demonstrates improvement in her ankle motion with some ongoing tightness to her Achilles and proving strength at 4+ out of 5 with eversion and dorsiflexion. Effectively negative anterior drawer. Negative talar tilt and Deng's testing. Contralateral ankle exam is benign. Examination of her right wrist does feel degenerative changes over the first [facility] joint MCP and IP joints of the left hand. She has had resolution of her swelling with resolved palmar ecchymosis extending down into the hand. She only at this point has only mild residual tenderness at 2-3 over the first [facility] joint without further tenderness over the radial metaphysis or DRUJ. Marked improvement in wrist motion and strength is improving. Mild discomfort with CMC grind testing with negative cubital and carpal tunnel Tinel's. Additional Examinations:  Contralateral Exam: Examination of the right knee reveals intact skin. There is no focal tenderness. The patient demonstrates full painless range of motion with regards to flexion and extension. Strength is 5/5 thorough out all planes. Ligamentous stability is grossly intact. Right Lower Extremity: Examination of the right lower extremity does not show any tenderness, deformity or injury. Range of motion is unremarkable. There is no gross instability. There are no rashes, ulcerations or lesions. Strength and tone are normal.  Left Lower Extremity: Examination of the left lower extremity does not show any tenderness, deformity or injury. Range of motion is unremarkable. There is no gross instability. There are no rashes, ulcerations or lesions.   Strength and tone are normal.      Diagnostic Test Findings: Left knee AP and PA weightbearing sunrise and lateral films were reviewed from 2021 and does show significant medial compartment narrowing. This is not quite bone-on-bone. She does have advanced bone-on-bone patellofemoral arthropathy on the left. Right ankle AP lateral mortise films were reviewed from 2021 in the office which does show fairly prominent tibiotalar arthritic changes to the anterior lateral portion of the tibial talar joint. No evidence of acute fracture. Left wrist AP lateral bleak and scaphoid films were reviewed from Dr. Marleny Garcia on 2021 and I do agree I do not see any evidence of obvious displaced fracture. Underlying carpal degenerative changes noted but no evidence of obvious displaced fracture. Left wrist MRI obtained at [address] [place] [address] on 2021 is listed above  Narrative   Site: meevl [facility] #: [id number] #: [id number] Procedure: MR Left Wrist joint w/o Contrast ; Reason for Exam: Sprain of left wrist. Rule out distal radius, scaphoid fracture vs. sprain. This document is confidential medical information.  Unauthorized disclosure or use of this information is prohibited by law. If you are not the intended recipient of this document, please advise us by calling immediately [phone].       [facility] [place] [id number]           Patient Name: Cinthia Nance   Case ID: [id number]   Patient : 1945   Referring Physician: Ad Medina MD   Exam Date: 2021   Exam Description: MR Left Wrist joint w/o Contrast            HISTORY:  Sprain of left wrist.  Evaluate for distal radius, scaphoid fracture versus sprain.       TECHNICAL FACTORS:  Long- and short-axis fat- and water-weighted images were performed.       COMPARISON:  None.       FINDINGS:  Diffuse swelling circumferentially surrounds the wrist and extends to the hand.       Cyst present within the lunate and less so scaphoid, capitate, hamate and triquetrum.     Remodeled 1st CMC joint.  Volar beak ligament is chronically torn and scarred or stretched and    insufficient.  Flexor and extensor tendon sheath inflammation.  No tear.       Edema changes throughout the carpals.  Subtle edema changes within the distal radius.     Hyperemic marrow change from underlying capsulosynovitis favored over acute contusion although    a mixed pattern may be present particularly considering history of trauma.       Scapholunate ligament and lunotriquetral ligament are intact.  TFCC is degenerated.  This    primarily involves the inner-third.  Pinhole perforation suspected.       CONCLUSION:   1. Diffuse capsulosynovitis throughout the radiocarpal and midcarpal joints and less so distal    radioulnar joint.  Traction- or impaction-related cyst and hyperemic marrow change scattered    throughout the carpals and less so distal radius.  Marrow changes in part on a degenerative    basis and likely in part from hyperemic change due to preexisting synovitis.  Superimposed    acute contusion may be contributory.  No fracture, AVN or mass. 2. Degeneration inner-third TFC.  Pinhole perforation suspected. 3. No scapholunate ligament or lunotriquetral ligament tear. 4. Severe arthrosis and remodeling 1st CMC joint.  Volar beak ligament is chronically torn and    scarred or stretched and insufficient. 5. Flexor and extensor tendon sheath inflammation.  No kristi tear or kristi tenosynovitis.                             Thank you for the opportunity to provide your interpretation.               Bhavik Bautista MD       A: Efrain 09/13/2021 2:23 PM   T: MARC 09/13/2021 12:58 PM             Assessment : #1. Improving recurrent aggravation left knee significant medial compartment osteoarthritis with bone-on-bone patellofemoral arthropathy status post completion of left knee GELSYN-3 injection 9/15/2021 #3.     #2.  5.5+ months status post substantially improved aggravation right ankle significant osteoarthritis with resolved ankle synovitis and improved posttraumatic peroneal tendinitis and pes planus  #3. 5.5 weeks status post fall with markedly improved MRI documented posttraumatic left wrist capsule synovitis with spraining and aggravation of underlying intercarpal/first CMC osteoarthritis with tenosynovitis and spraining (MRI negative for occult fracture )    Impression:  Encounter Diagnoses   Name Primary?  Primary osteoarthritis of left knee Yes    Chondromalacia patellae of left knee     Acute pain of left knee     Sprain of left wrist, subsequent encounter     Left wrist pain     Primary osteoarthritis of first carpometacarpal joint of left hand     Primary osteoarthritis of left ankle        Office Procedures:  No orders of the defined types were placed in this encounter. Treatment Plan:  Treatment options were discussed with Linard Apgar. We did once again review her plain films and exam findings. Does have quite prominent anterior medial compartment osteoarthritis. Cliniallyc at this time she has noticed a moderate improvement overall in her left knee pain following completion of her GELSYN-3 injection series on 9/15/2021. Her wrist is also doing much better on the left and rates her improvement 90+ percent. She has done very well with hand therapy and is not requiring splinting. She is only having more of a weakness which is improving with her home-based exercises as the majority of her pain has resolved. She will continue with her home exercise program for her knee and her ankle continue with her bracing and arch supports. She did talk about Voltaren gel with Dr. Mendoza Love who felt more comfortable her off of Voltaren gel and she may supplement with Tylenol. I think she does a little bit more time for her GELSYN-3 injection series to kick in although her current pain symptoms are definitely tolerable with pain being only about a 2-3 out of 10.   She is aware she can have repeat viscosupplementation in mid 2022 or interim steroid injections if necessary. She will contact us in the interim with questions or concerns. This dictation was performed with a verbal recognition program (DRAGON) and it was checked for errors. It is possible that there are still dictated errors within this office note. If so, please bring any errors to my attention for an addendum. All efforts were made to ensure that this office note is accurate.

## 2021-11-05 ENCOUNTER — HOSPITAL ENCOUNTER (OUTPATIENT)
Age: 76
Setting detail: SPECIMEN
Discharge: HOME | End: 2021-11-05
Payer: MEDICARE

## 2021-11-05 DIAGNOSIS — N28.9 RENAL INSUFFICIENCY: ICD-10-CM

## 2021-11-05 LAB
A/G RATIO: 1.6 (ref 1.1–2.2)
ALBUMIN SERPL-MCNC: 4.2 G/DL (ref 3.4–5)
ALP BLD-CCNC: 64 U/L (ref 40–129)
ALT SERPL-CCNC: 10 U/L (ref 10–40)
ANION GAP SERPL CALCULATED.3IONS-SCNC: 7 MMOL/L (ref 3–16)
AST SERPL-CCNC: 18 U/L (ref 15–37)
BILIRUB SERPL-MCNC: 0.4 MG/DL (ref 0–1)
BUN BLDV-MCNC: 16 MG/DL (ref 7–20)
CALCIUM SERPL-MCNC: 9.3 MG/DL (ref 8.3–10.6)
CHLORIDE BLD-SCNC: 100 MMOL/L (ref 99–110)
CO2: 30 MMOL/L (ref 21–32)
CREAT SERPL-MCNC: 1.1 MG/DL (ref 0.6–1.2)
GFR AFRICAN AMERICAN: 58
GFR NON-AFRICAN AMERICAN: 48
GLUCOSE BLD-MCNC: 75 MG/DL (ref 70–99)
POTASSIUM SERPL-SCNC: 4.5 MMOL/L (ref 3.5–5.1)
SODIUM BLD-SCNC: 137 MMOL/L (ref 136–145)
TOTAL PROTEIN: 6.9 G/DL (ref 6.4–8.2)

## 2021-11-05 PROCEDURE — 80053 COMPREHEN METABOLIC PANEL: CPT

## 2021-11-05 PROCEDURE — 36415 COLL VENOUS BLD VENIPUNCTURE: CPT

## 2021-11-09 ENCOUNTER — TELEPHONE (OUTPATIENT)
Dept: INTERNAL MEDICINE CLINIC | Age: 76
End: 2021-11-09

## 2022-01-19 ENCOUNTER — HOSPITAL ENCOUNTER (OUTPATIENT)
Age: 77
Discharge: HOME | End: 2022-01-19
Payer: MEDICARE

## 2022-01-19 DIAGNOSIS — E78.5 HYPERLIPIDEMIA, UNSPECIFIED HYPERLIPIDEMIA TYPE: ICD-10-CM

## 2022-01-19 PROBLEM — N18.30 STAGE 3 CHRONIC KIDNEY DISEASE, UNSPECIFIED WHETHER STAGE 3A OR 3B CKD (HCC): Status: ACTIVE | Noted: 2022-01-19

## 2022-01-19 LAB
A/G RATIO: 1.6 (ref 1.1–2.2)
ALBUMIN SERPL-MCNC: 4.1 G/DL (ref 3.4–5)
ALP BLD-CCNC: 69 U/L (ref 40–129)
ALT SERPL-CCNC: 9 U/L (ref 10–40)
ANION GAP SERPL CALCULATED.3IONS-SCNC: 12 MMOL/L (ref 3–16)
AST SERPL-CCNC: 17 U/L (ref 15–37)
BILIRUB SERPL-MCNC: 0.5 MG/DL (ref 0–1)
BUN BLDV-MCNC: 15 MG/DL (ref 7–20)
CALCIUM SERPL-MCNC: 9.5 MG/DL (ref 8.3–10.6)
CHLORIDE BLD-SCNC: 104 MMOL/L (ref 99–110)
CHOLESTEROL, TOTAL: 192 MG/DL (ref 0–199)
CO2: 26 MMOL/L (ref 21–32)
CREAT SERPL-MCNC: 1.2 MG/DL (ref 0.6–1.2)
GFR AFRICAN AMERICAN: 53
GFR NON-AFRICAN AMERICAN: 43
GLUCOSE BLD-MCNC: 85 MG/DL (ref 70–99)
HDLC SERPL-MCNC: 62 MG/DL (ref 40–60)
LDL CHOLESTEROL CALCULATED: 102 MG/DL
POTASSIUM SERPL-SCNC: 4.4 MMOL/L (ref 3.5–5.1)
SODIUM BLD-SCNC: 142 MMOL/L (ref 136–145)
TOTAL PROTEIN: 6.7 G/DL (ref 6.4–8.2)
TRIGL SERPL-MCNC: 140 MG/DL (ref 0–150)
VLDLC SERPL CALC-MCNC: 28 MG/DL

## 2022-01-19 PROCEDURE — 36415 COLL VENOUS BLD VENIPUNCTURE: CPT

## 2022-01-19 PROCEDURE — 80053 COMPREHEN METABOLIC PANEL: CPT

## 2022-01-19 PROCEDURE — 80061 LIPID PANEL: CPT

## 2022-01-20 ENCOUNTER — HOSPITAL ENCOUNTER (OUTPATIENT)
Age: 77
Discharge: HOME | End: 2022-01-20
Payer: MEDICARE

## 2022-01-20 ENCOUNTER — OFFICE VISIT (OUTPATIENT)
Dept: INTERNAL MEDICINE CLINIC | Age: 77
End: 2022-01-20
Payer: MEDICARE

## 2022-01-20 ENCOUNTER — HOSPITAL ENCOUNTER (OUTPATIENT)
Dept: GENERAL RADIOLOGY | Age: 77
Discharge: HOME | End: 2022-01-20
Payer: MEDICARE

## 2022-01-20 VITALS
BODY MASS INDEX: 25.23 KG/M2 | WEIGHT: 147 LBS | DIASTOLIC BLOOD PRESSURE: 70 MMHG | OXYGEN SATURATION: 96 % | HEART RATE: 98 BPM | SYSTOLIC BLOOD PRESSURE: 114 MMHG

## 2022-01-20 DIAGNOSIS — R06.09 DOE (DYSPNEA ON EXERTION): Primary | ICD-10-CM

## 2022-01-20 DIAGNOSIS — K21.00 GASTROESOPHAGEAL REFLUX DISEASE WITH ESOPHAGITIS WITHOUT HEMORRHAGE: ICD-10-CM

## 2022-01-20 DIAGNOSIS — N18.30 STAGE 3 CHRONIC KIDNEY DISEASE, UNSPECIFIED WHETHER STAGE 3A OR 3B CKD (HCC): ICD-10-CM

## 2022-01-20 DIAGNOSIS — J47.9 BRONCHIECTASIS WITHOUT COMPLICATION (HCC): ICD-10-CM

## 2022-01-20 DIAGNOSIS — R06.09 DOE (DYSPNEA ON EXERTION): ICD-10-CM

## 2022-01-20 DIAGNOSIS — K58.9 IRRITABLE BOWEL SYNDROME WITHOUT DIARRHEA: ICD-10-CM

## 2022-01-20 DIAGNOSIS — E78.5 HYPERLIPIDEMIA, UNSPECIFIED HYPERLIPIDEMIA TYPE: ICD-10-CM

## 2022-01-20 DIAGNOSIS — F32.5 MAJOR DEPRESSIVE DISORDER IN FULL REMISSION, UNSPECIFIED WHETHER RECURRENT (HCC): ICD-10-CM

## 2022-01-20 DIAGNOSIS — M17.12 PRIMARY OSTEOARTHRITIS OF LEFT KNEE: ICD-10-CM

## 2022-01-20 LAB
HCT VFR BLD CALC: 39.9 % (ref 36–48)
HEMOGLOBIN: 13.3 G/DL (ref 12–16)
MCH RBC QN AUTO: 30.2 PG (ref 26–34)
MCHC RBC AUTO-ENTMCNC: 33.3 G/DL (ref 31–36)
MCV RBC AUTO: 90.6 FL (ref 80–100)
PDW BLD-RTO: 13.6 % (ref 12.4–15.4)
PLATELET # BLD: 238 K/UL (ref 135–450)
PMV BLD AUTO: 7.4 FL (ref 5–10.5)
RBC # BLD: 4.4 M/UL (ref 4–5.2)
WBC # BLD: 7.8 K/UL (ref 4–11)

## 2022-01-20 PROCEDURE — 36415 COLL VENOUS BLD VENIPUNCTURE: CPT

## 2022-01-20 PROCEDURE — 99215 OFFICE O/P EST HI 40 MIN: CPT | Performed by: INTERNAL MEDICINE

## 2022-01-20 PROCEDURE — 84436 ASSAY OF TOTAL THYROXINE: CPT

## 2022-01-20 PROCEDURE — G8399 PT W/DXA RESULTS DOCUMENT: HCPCS | Performed by: INTERNAL MEDICINE

## 2022-01-20 PROCEDURE — 4040F PNEUMOC VAC/ADMIN/RCVD: CPT | Performed by: INTERNAL MEDICINE

## 2022-01-20 PROCEDURE — 85027 COMPLETE CBC AUTOMATED: CPT

## 2022-01-20 PROCEDURE — 84443 ASSAY THYROID STIM HORMONE: CPT

## 2022-01-20 PROCEDURE — 1036F TOBACCO NON-USER: CPT | Performed by: INTERNAL MEDICINE

## 2022-01-20 PROCEDURE — 71046 X-RAY EXAM CHEST 2 VIEWS: CPT

## 2022-01-20 PROCEDURE — G8484 FLU IMMUNIZE NO ADMIN: HCPCS | Performed by: INTERNAL MEDICINE

## 2022-01-20 PROCEDURE — 1123F ACP DISCUSS/DSCN MKR DOCD: CPT | Performed by: INTERNAL MEDICINE

## 2022-01-20 PROCEDURE — G8427 DOCREV CUR MEDS BY ELIG CLIN: HCPCS | Performed by: INTERNAL MEDICINE

## 2022-01-20 PROCEDURE — 3288F FALL RISK ASSESSMENT DOCD: CPT | Performed by: INTERNAL MEDICINE

## 2022-01-20 PROCEDURE — 1090F PRES/ABSN URINE INCON ASSESS: CPT | Performed by: INTERNAL MEDICINE

## 2022-01-20 PROCEDURE — G8417 CALC BMI ABV UP PARAM F/U: HCPCS | Performed by: INTERNAL MEDICINE

## 2022-01-20 ASSESSMENT — PATIENT HEALTH QUESTIONNAIRE - PHQ9
SUM OF ALL RESPONSES TO PHQ QUESTIONS 1-9: 0
SUM OF ALL RESPONSES TO PHQ9 QUESTIONS 1 & 2: 0
SUM OF ALL RESPONSES TO PHQ QUESTIONS 1-9: 0
6. FEELING BAD ABOUT YOURSELF - OR THAT YOU ARE A FAILURE OR HAVE LET YOURSELF OR YOUR FAMILY DOWN: 0
8. MOVING OR SPEAKING SO SLOWLY THAT OTHER PEOPLE COULD HAVE NOTICED. OR THE OPPOSITE, BEING SO FIGETY OR RESTLESS THAT YOU HAVE BEEN MOVING AROUND A LOT MORE THAN USUAL: 0
4. FEELING TIRED OR HAVING LITTLE ENERGY: 0
10. IF YOU CHECKED OFF ANY PROBLEMS, HOW DIFFICULT HAVE THESE PROBLEMS MADE IT FOR YOU TO DO YOUR WORK, TAKE CARE OF THINGS AT HOME, OR GET ALONG WITH OTHER PEOPLE: 0
7. TROUBLE CONCENTRATING ON THINGS, SUCH AS READING THE NEWSPAPER OR WATCHING TELEVISION: 0
SUM OF ALL RESPONSES TO PHQ QUESTIONS 1-9: 0
3. TROUBLE FALLING OR STAYING ASLEEP: 0
5. POOR APPETITE OR OVEREATING: 0
9. THOUGHTS THAT YOU WOULD BE BETTER OFF DEAD, OR OF HURTING YOURSELF: 0
SUM OF ALL RESPONSES TO PHQ QUESTIONS 1-9: 0
1. LITTLE INTEREST OR PLEASURE IN DOING THINGS: 0
2. FEELING DOWN, DEPRESSED OR HOPELESS: 0

## 2022-01-20 NOTE — PROGRESS NOTES
Niseren Harkins 64 y.o. female presents today for an Established patient for   Chief Complaint   Patient presents with    Hyperlipidemia            ASSESSMENT/PLAN    1. BUSCH (dyspnea on exertion)           Evaluate further with lab testing. Obtain pharmacological stress test secondary to osteoarthritis left knee  - CBC; Future  - T4; Future  - TSH with Reflex; Future  - XR CHEST (2 VW); Future  - ECHO Pharmacological Stress Test; Future    2. Stage 3 chronic kidney disease, unspecified whether stage 3a or 3b CKD (HCC)               Creatinine level stable    3. Hyperlipidemia, unspecified hyperlipidemia type               Borderline elevation of lipids. Discussed diet. 4. Primary osteoarthritis of left knee                Follow-up with orthopedics    5. Major depressive disorder in full remission, unspecified whether recurrent ([address]               Stable at this time. 6. Bronchiectasis without complication ([address]                 Asymptomatic at this time. 7. Irritable bowel syndrome without diarrhea                    Under control. 8. Gastroesophageal reflux disease with esophagitis without hemorrhage                     Baseline. HPI:     Review last office visit with me: 7/20/2021 renal insufficiency treated with stopping Voltaren, hyperlipidemia, depression, bronchiectasis, GERD, insomnia, IBS, positive COVID infection. 1/19/2022 chemistry panel: Creatinine: 1.2. GFR 43. Lipid panel: Total cholesterol: 192. LDL cholesterol 1.02. This compares laboratory data 11/5/2021 creatinine level 1.1. GFR: 43. Office visit: 10/13/2021 orthopedics Dr. Thao Conrad left knee osteoarthritis. Status post injection x2. Health maintenance: Influenza vaccine, Tdap, shingles  Patient seen by chiropractor Dr. Jane Luna for neck and back discomfort. Patient complains of BUSCH with steps but denies chest pain. Reviewed previous work-up 3/23/2017 GXT was normal.  Risk factors. Social history tobacco none. Diabetes none. HTN none. Hyperlipidemia positive. Family history positive for coronary artery disease in mother CABG in her 76s and maternal grandmother. Results for orders placed or performed during the hospital encounter of 01/19/22   Comprehensive Metabolic Panel   Result Value Ref Range    Sodium 142 136 - 145 mmol/L    Potassium 4.4 3.5 - 5.1 mmol/L    Chloride 104 99 - 110 mmol/L    CO2 26 21 - 32 mmol/L    Anion Gap 12 3 - 16    Glucose 85 70 - 99 mg/dL    BUN 15 7 - 20 mg/dL    CREATININE 1.2 0.6 - 1.2 mg/dL    GFR Non- 43 (A) >60    GFR  53 (A) >60    Calcium 9.5 8.3 - 10.6 mg/dL    Total Protein 6.7 6.4 - 8.2 g/dL    Albumin 4.1 3.4 - 5.0 g/dL    Albumin/Globulin Ratio 1.6 1.1 - 2.2    Total Bilirubin 0.5 0.0 - 1.0 mg/dL    Alkaline Phosphatase 69 40 - 129 U/L    ALT 9 (L) 10 - 40 U/L    AST 17 15 - 37 U/L   Lipid Panel   Result Value Ref Range    Cholesterol, Total 192 0 - 199 mg/dL    Triglycerides 140 0 - 150 mg/dL    HDL 62 (H) 40 - 60 mg/dL    LDL Calculated 102 (H) <100 mg/dL    VLDL Cholesterol Calculated 28 Not Established mg/dL              ROS:  Review of Systems   Constitutional: negative   HENT: negative   EYES: negative   Respiratory: negative   Gastrointestinal: negative   Endocrine: negative   Musculoskeletal: negative   Skin: negative   Allergic/Immunological: negative   Hematological: negative   Psychiatric/Behavorial: negative   CV: BUSCH. No chest pain. Previous GXT 2017 negative. Positive family history for CAD  CNS: negative   :Negative   S/E:Negative  Renal: Creatinine fairly stable. Physical Exam:  Head/neck: Ears: Normal TM. No obstruction. Throat: Mask. Not examined. Thyroids not palpable. Neck: No lymphadenopathy. Eyes: EOMI, PERRLA with no nystagmus  Lungs: Clear to auscultation. CV: S1-S2 normal.   LOUIS murmur. Carotid: No bruit. Abdominal Examination: Bowel sounds present. Soft nontender.   No mass no guarding or rebound. Spine/extremities: No edema. No tenderness to palpation. Skin: No rash  CNS: Patient is alert, cooperative, moves all 4 limbs, ambulates without difficulty, light touch normal.  Good historian. Good orientation. Blood pressure 114/70, pulse 98, weight 147 lb (66.7 kg), SpO2 96 %, not currently breastfeeding.          Binh Knapp MD

## 2022-01-21 LAB
T4 TOTAL: 5.5 UG/DL (ref 4.5–10.9)
TSH REFLEX: 1.93 UIU/ML (ref 0.27–4.2)

## 2022-02-08 DIAGNOSIS — E78.5 HYPERLIPIDEMIA: ICD-10-CM

## 2022-02-08 RX ORDER — PRAVASTATIN SODIUM 20 MG
TABLET ORAL
Qty: 90 TABLET | Refills: 1 | Status: SHIPPED | OUTPATIENT
Start: 2022-02-08 | End: 2022-08-08

## 2022-02-08 NOTE — TELEPHONE ENCOUNTER
Refill request for Pravastatin medication.      Name of [address] [place] [address] visit - 1/20/22     Pending visit - 4/21/22    Last refill -8/12/21      Medication Contract signed -   Last Oarrs ran-         Additional Comments

## 2022-02-10 ENCOUNTER — HOSPITAL ENCOUNTER (OUTPATIENT)
Dept: NON INVASIVE DIAGNOSTICS | Age: 77
Discharge: HOME | End: 2022-02-10

## 2022-02-10 DIAGNOSIS — R06.09 DOE (DYSPNEA ON EXERTION): ICD-10-CM

## 2022-02-16 DIAGNOSIS — M25.562 ACUTE PAIN OF LEFT KNEE: ICD-10-CM

## 2022-02-16 DIAGNOSIS — M22.42 CHONDROMALACIA PATELLAE OF LEFT KNEE: ICD-10-CM

## 2022-02-16 DIAGNOSIS — M17.12 PRIMARY OSTEOARTHRITIS OF LEFT KNEE: Primary | ICD-10-CM

## 2022-02-22 ENCOUNTER — HOSPITAL ENCOUNTER (OUTPATIENT)
Dept: NON INVASIVE DIAGNOSTICS | Age: 77
Discharge: HOME | End: 2022-02-22
Payer: MEDICARE

## 2022-02-22 VITALS — WEIGHT: 147 LBS | BODY MASS INDEX: 25.23 KG/M2

## 2022-02-22 PROCEDURE — 6360000002 HC RX W HCPCS: Performed by: INTERNAL MEDICINE

## 2022-02-22 PROCEDURE — 93351 STRESS TTE COMPLETE: CPT

## 2022-02-22 RX ORDER — DOBUTAMINE HYDROCHLORIDE 100 MG/100ML
10 INJECTION INTRAVENOUS CONTINUOUS
Status: DISCONTINUED | OUTPATIENT
Start: 2022-02-22 | End: 2022-02-23 | Stop reason: HOSPADM

## 2022-02-22 RX ORDER — DOBUTAMINE HYDROCHLORIDE 200 MG/100ML
10 INJECTION INTRAVENOUS CONTINUOUS
Status: DISCONTINUED | OUTPATIENT
Start: 2022-02-22 | End: 2022-02-22 | Stop reason: SDUPTHER

## 2022-02-22 RX ADMIN — DOBUTAMINE HYDROCHLORIDE 10 MCG/KG/MIN: 100 INJECTION INTRAVENOUS at 13:09

## 2022-02-23 ENCOUNTER — TELEPHONE (OUTPATIENT)
Dept: CARDIOLOGY CLINIC | Age: 77
End: 2022-02-23

## 2022-02-23 NOTE — TELEPHONE ENCOUNTER
----- Message from Eyal Joe DO sent at 2/23/2022  9:07 AM EST -----  Patient's dobutamine stress echo is normal.  I do not believe that I have ever seen this patient and believe that the stress test was actually ordered by her PCP per review of her chart. I will forward to her PCP and if he would like her to be further evaluated by cardiology, I am happy to see her in clinic. Thanks.

## 2022-03-04 ENCOUNTER — TELEPHONE (OUTPATIENT)
Dept: ORTHOPEDIC SURGERY | Age: 77
End: 2022-03-04

## 2022-03-04 NOTE — TELEPHONE ENCOUNTER
Spoke to patient and let them know that GELSYN-3 injections have been approved for their LEFT knee. ELIGIBLE STARTING 3/16/22. Patient states she is doing ok for right now and will call me when she is ready to schedule.

## 2022-04-20 NOTE — PROGRESS NOTES
Fabby Munroe 64 y.o. female presents today for an Established patient for   Chief Complaint   Patient presents with    Follow-up     from januarys chest xray and eco    Immunizations     aware of shingrix and TDAP             ASSESSMENT/PLAN    1. BUSCH (dyspnea on exertion)       work-up: 2/22/2022 Echo pharmacological stress test: Normal dobutamine stress test.  Positive family history for coronary artery disease. Hyperlipidemia. Will evaluate further with cardiology consultation       - [address]    2. Stage 3 chronic kidney disease, unspecified whether stage 3a or 3b CKD (HCC)           creatinine 1.2. GFR 43. Continue to monitor    3. Gastroesophageal reflux disease with esophagitis without hemorrhage                stable. 4. Bronchiectasis without complication [facility])                 patient non-smoker: Bronchoscopy 2014 Dr. Yuridia Cruz.:  Right middle lobe atelectasis with bronchiectasis. Evaluate further with pulmonary function test.    5. Hyperlipidemia, unspecified hyperlipidemia type                     - Lipid Panel; Future  - Comprehensive Metabolic Panel; Future  - [address]    6. Depression, unspecified depression type                Stable. 7. Irritable bowel syndrome without diarrhea                stable. 8. Primary osteoarthritis of left knee                  baseline. HPI:             reviewed last office visit with me: 1/20/2022. At that time patient complained of dyspnea on exertion. Evaluation was ordered. ,  Stage III chronic kidney disease creatinine level stable, hyperlipidemia with borderline elevation of lipids, osteoarthritis left knee follow-up with orthopedics, depression stable. Atelectasis asymptomatic at this time. IBS under control. Reflux: Baseline  Medicines reviewed. Health maintenance: Shingles vaccine, Tdap. Reviewed laboratory data 1/19/2022. Chemistry panel: GFR 43. Creatinine 1.2, lipid panel:  Total cholesterol: 192.  LDL cholesterol 102. Labs from 1/20/2022. T4 total: 5.5. TSH 1.93. CBC normal.  From 1/20/2022 chest x-ray: Bibasilar atelectasis versus infiltrates. Normal cardiac size  From 2/22/2022. Echo pharmacological stress test: EKG shows normal sinus rhythm. Poor R wave progression. Dobutamine stress echo no cardiac arrhythmia. No symptoms with dobutamine. Normal left ventricular function with ejection fraction 55%. .  No area of stress-induced hypokinesis. Normal dobutamine stress echocardiogram  Patient relates still sense of dyspnea on exertion. Family history notable for mother with CABG. Maternal grandmother with heart attack at 76. [employer] Results for orders placed or performed during the hospital encounter of 01/20/22   CBC   Result Value Ref Range    WBC 7.8 4.0 - 11.0 K/uL    RBC 4.40 4.00 - 5.20 M/uL    Hemoglobin 13.3 12.0 - 16.0 g/dL    Hematocrit 39.9 36.0 - 48.0 %    MCV 90.6 80.0 - 100.0 fL    MCH 30.2 26.0 - 34.0 pg    MCHC 33.3 31.0 - 36.0 g/dL    RDW 13.6 12.4 - 15.4 %    Platelets 852 473 - 257 K/uL    MPV 7.4 5.0 - 10.5 fL   T4   Result Value Ref Range    T4, Total 5.5 4.5 - 10.9 ug/dL   TSH with Reflex   Result Value Ref Range    TSH 1.93 0.27 - 4.20 uIU/mL              ROS:  Review of Systems   Constitutional: negative   HENT: negative   EYES: negative   Respiratory: Never smoker  Gastrointestinal: negative   Endocrine: negative   Musculoskeletal: negative   Skin: negative   Allergic/Immunological: negative   Hematological: negative   Psychiatric/Behavorial: negative   CV: BUSCH. Positive family history for coronary artery disease. Hyperlipidemia  CNS: negative   :Negative   S/E:Negative  Renal: Negative     Physical Exam:  Head/neck: Ears: Normal TM. No obstruction. Throat: Mask. Not examined. Thyroids not palpable. Neck: No lymphadenopathy. Eyes: EOMI, PERRLA with no nystagmus  Lungs: Clear to auscultation. CV: S1-S2 normal.  LOUIS murmur. Carotid: No bruit.   Abdominal Examination: Bowel sounds present. Soft nontender. No mass no guarding or   rebound. Spine/extremities: No edema. No tenderness to palpation. Skin: No rash  CNS: Patient is alert, cooperative, moves all 4 limbs, ambulates without difficulty, light touch normal.   Good historian. [facility]. Blood pressure 122/80, pulse 85, temperature 96.8 °F (36 °C), temperature source Temporal, weight 147 lb 11.2 oz (67 kg), SpO2 97 %, not currently breastfeeding.          Michelle Casarez MD

## 2022-04-21 ENCOUNTER — OFFICE VISIT (OUTPATIENT)
Dept: INTERNAL MEDICINE CLINIC | Age: 77
End: 2022-04-21
Payer: MEDICARE

## 2022-04-21 VITALS
SYSTOLIC BLOOD PRESSURE: 122 MMHG | OXYGEN SATURATION: 97 % | TEMPERATURE: 96.8 F | WEIGHT: 147.7 LBS | BODY MASS INDEX: 25.35 KG/M2 | DIASTOLIC BLOOD PRESSURE: 80 MMHG | HEART RATE: 85 BPM

## 2022-04-21 DIAGNOSIS — F32.A DEPRESSION, UNSPECIFIED DEPRESSION TYPE: ICD-10-CM

## 2022-04-21 DIAGNOSIS — M17.12 PRIMARY OSTEOARTHRITIS OF LEFT KNEE: ICD-10-CM

## 2022-04-21 DIAGNOSIS — R06.09 DOE (DYSPNEA ON EXERTION): Primary | ICD-10-CM

## 2022-04-21 DIAGNOSIS — N18.30 STAGE 3 CHRONIC KIDNEY DISEASE, UNSPECIFIED WHETHER STAGE 3A OR 3B CKD (HCC): ICD-10-CM

## 2022-04-21 DIAGNOSIS — E78.5 HYPERLIPIDEMIA, UNSPECIFIED HYPERLIPIDEMIA TYPE: ICD-10-CM

## 2022-04-21 DIAGNOSIS — K58.9 IRRITABLE BOWEL SYNDROME WITHOUT DIARRHEA: ICD-10-CM

## 2022-04-21 DIAGNOSIS — J47.9 BRONCHIECTASIS WITHOUT COMPLICATION (HCC): ICD-10-CM

## 2022-04-21 DIAGNOSIS — K21.00 GASTROESOPHAGEAL REFLUX DISEASE WITH ESOPHAGITIS WITHOUT HEMORRHAGE: ICD-10-CM

## 2022-04-21 PROCEDURE — 1036F TOBACCO NON-USER: CPT | Performed by: INTERNAL MEDICINE

## 2022-04-21 PROCEDURE — 4040F PNEUMOC VAC/ADMIN/RCVD: CPT | Performed by: INTERNAL MEDICINE

## 2022-04-21 PROCEDURE — G8399 PT W/DXA RESULTS DOCUMENT: HCPCS | Performed by: INTERNAL MEDICINE

## 2022-04-21 PROCEDURE — G8427 DOCREV CUR MEDS BY ELIG CLIN: HCPCS | Performed by: INTERNAL MEDICINE

## 2022-04-21 PROCEDURE — G8417 CALC BMI ABV UP PARAM F/U: HCPCS | Performed by: INTERNAL MEDICINE

## 2022-04-21 PROCEDURE — 1090F PRES/ABSN URINE INCON ASSESS: CPT | Performed by: INTERNAL MEDICINE

## 2022-04-21 PROCEDURE — 99215 OFFICE O/P EST HI 40 MIN: CPT | Performed by: INTERNAL MEDICINE

## 2022-04-21 PROCEDURE — 1123F ACP DISCUSS/DSCN MKR DOCD: CPT | Performed by: INTERNAL MEDICINE

## 2022-04-21 NOTE — PATIENT INSTRUCTIONS
TDAP/Obtain the new shingles vaccine (Shingrix) at the pharmacy with Rx - (will need 2nd dose of shingrix 2-6 months later)

## 2022-04-22 ENCOUNTER — TELEPHONE (OUTPATIENT)
Dept: INTERNAL MEDICINE CLINIC | Age: 77
End: 2022-04-22

## 2022-04-22 PROBLEM — R06.09 DYSPNEA ON EXERTION: Status: ACTIVE | Noted: 2022-02-22

## 2022-04-22 NOTE — TELEPHONE ENCOUNTER
Please call patient. Concerning patient's difficulty with dyspnea on exertion/shortness of breath would like to evaluate further her lung function. Pulmonary function test with and without bronchodilators ordered, in epic. .  Give patient phone number for central scheduling. Tell her to call me next day after test to get results.   Dr. Sakshi De Leon

## 2022-04-25 NOTE — TELEPHONE ENCOUNTER
Left message for patient to call and schedule PFT / [phone]   Instructed to let us know when scheduled.    Will watch and follow up with patient

## 2022-04-29 NOTE — TELEPHONE ENCOUNTER
Call taken by [facility].         Patient called to say she scheduled her pulmonary test for May 26 at 9:30

## 2022-04-30 NOTE — TELEPHONE ENCOUNTER
Tell patient to call me 2 days after obtaining her pulmonary function test for us to discuss results  Dr. Guy Gallardo

## 2022-05-02 NOTE — TELEPHONE ENCOUNTER
[phone] (home)   Left message for pt to let us know  2 days prior and dr. Rony Swan will watch for results.

## 2022-05-09 RX ORDER — FLUOXETINE HYDROCHLORIDE 20 MG/1
CAPSULE ORAL
Qty: 90 CAPSULE | Refills: 1 | Status: SHIPPED | OUTPATIENT
Start: 2022-05-09 | End: 2022-06-15

## 2022-05-09 RX ORDER — BUPROPION HYDROCHLORIDE 150 MG/1
TABLET, EXTENDED RELEASE ORAL
Qty: 90 TABLET | Refills: 1 | Status: SHIPPED | OUTPATIENT
Start: 2022-05-09 | End: 2022-06-15

## 2022-05-26 ENCOUNTER — HOSPITAL ENCOUNTER (OUTPATIENT)
Dept: PULMONOLOGY | Age: 77
Discharge: HOME | End: 2022-05-26
Payer: MEDICARE

## 2022-05-26 VITALS — OXYGEN SATURATION: 96 %

## 2022-05-26 LAB
DLCO %PRED: 81 %
DLCO PRED: NORMAL
DLCO/VA %PRED: NORMAL
DLCO/VA PRED: NORMAL
DLCO/VA: NORMAL
DLCO: NORMAL
EXPIRATORY TIME-POST: NORMAL
EXPIRATORY TIME: NORMAL
FEF 25-75% %CHNG: NORMAL
FEF 25-75% %PRED-POST: NORMAL
FEF 25-75% %PRED-PRE: NORMAL
FEF 25-75% PRED: NORMAL
FEF 25-75%-POST: NORMAL
FEF 25-75%-PRE: NORMAL
FEV1 %PRED-POST: 72 %
FEV1 %PRED-PRE: 77 %
FEV1 PRED: NORMAL
FEV1-POST: NORMAL
FEV1-PRE: NORMAL
FEV1/FVC %PRED-POST: NORMAL
FEV1/FVC %PRED-PRE: NORMAL
FEV1/FVC PRED: NORMAL
FEV1/FVC-POST: 70 %
FEV1/FVC-PRE: 77 %
FVC %PRED-POST: 77 L
FVC %PRED-PRE: 75 %
FVC PRED: NORMAL
FVC-POST: NORMAL
FVC-PRE: NORMAL
GAW %PRED: NORMAL
GAW PRED: NORMAL
GAW: NORMAL
IC %PRED: NORMAL
IC PRED: NORMAL
IC: NORMAL
MEP: NORMAL
MIP: NORMAL
MVV %PRED-PRE: NORMAL
MVV PRED: NORMAL
MVV-PRE: NORMAL
PEF %PRED-POST: NORMAL
PEF %PRED-PRE: NORMAL
PEF PRED: NORMAL
PEF%CHNG: NORMAL
PEF-POST: NORMAL
PEF-PRE: NORMAL
RAW %PRED: NORMAL
RAW PRED: NORMAL
RAW: NORMAL
RV %PRED: NORMAL
RV PRED: NORMAL
RV: NORMAL
SVC %PRED: NORMAL
SVC PRED: NORMAL
SVC: NORMAL
TLC %PRED: 73 %
TLC PRED: NORMAL
TLC: NORMAL
VA %PRED: NORMAL
VA PRED: NORMAL
VA: NORMAL
VTG %PRED: NORMAL
VTG PRED: NORMAL
VTG: NORMAL

## 2022-05-26 PROCEDURE — 94726 PLETHYSMOGRAPHY LUNG VOLUMES: CPT

## 2022-05-26 PROCEDURE — 94060 EVALUATION OF WHEEZING: CPT

## 2022-05-26 PROCEDURE — 94760 N-INVAS EAR/PLS OXIMETRY 1: CPT

## 2022-05-26 PROCEDURE — 94729 DIFFUSING CAPACITY: CPT

## 2022-05-26 PROCEDURE — 6370000000 HC RX 637 (ALT 250 FOR IP): Performed by: INTERNAL MEDICINE

## 2022-05-26 RX ORDER — ALBUTEROL SULFATE 90 UG/1
4 AEROSOL, METERED RESPIRATORY (INHALATION) ONCE
Status: COMPLETED | OUTPATIENT
Start: 2022-05-26 | End: 2022-05-26

## 2022-05-26 RX ADMIN — Medication 4 PUFF: at 09:22

## 2022-05-26 ASSESSMENT — PULMONARY FUNCTION TESTS
FEV1/FVC_PRE: 77
FEV1_PERCENT_PREDICTED_PRE: 77
FEV1_PERCENT_PREDICTED_POST: 72
FVC_PERCENT_PREDICTED_PRE: 75
FVC_PERCENT_PREDICTED_POST: 77
FEV1/FVC_POST: 70

## 2022-05-27 ENCOUNTER — TELEPHONE (OUTPATIENT)
Dept: INTERNAL MEDICINE CLINIC | Age: 77
End: 2022-05-27

## 2022-05-27 NOTE — TELEPHONE ENCOUNTER
Patient had her PFT performed today and would like Dr. Meryl Patterson to review.        [phone] (home)

## 2022-05-31 ENCOUNTER — TELEPHONE (OUTPATIENT)
Dept: PULMONOLOGY | Age: 77
End: 2022-05-31

## 2022-05-31 NOTE — TELEPHONE ENCOUNTER
Chart reviewed. PFTs performed 5/26/2022 but I see no pulmonologist reading of the data. Please call [facility] ask which pulmonologist will read the study?   Dr. Terri Lowery

## 2022-05-31 NOTE — TELEPHONE ENCOUNTER
Dr Truman Espinoza Day, nurse assistant call to ask to  review pt PFT, per Dr Truman Espinoza and let him know of results.

## 2022-06-01 NOTE — PROCEDURES
[address]                               PULMONARY FUNCTION    PATIENT NAME: Devon Cordero                     :        1945  MED REC NO:   [id number]                          ROOM:  ACCOUNT NO:   [de-identified]                           ADMIT DATE: 2022  PROVIDER:     Abbi Javier MD    DATE OF PROCEDURE:  2022    PFT INTERPRETATION    The patient is 77-year-old female who underwent a PFT for dyspnea on  exertion. Spirometry shows FVC to be 75%, FEV1 to be 77%, FEV1 to FVC  ratio was 103%, JAZ16-90% was 83%. The patient did not have any  significant postbronchodilator improvement. Lung volume shows the total  capacity was mildly reduced at 73%. The patient does not have any air  trapping or hyperinflation. The patient's diffusion capacity when  adjusted for volume was normal.  The patient's flow-volume loop was  normal.  On the basis of this PFT, the patient has mild restrictive lung  disease. Please correlate clinically.         Samantha Kim MD    D: 2022 17:56:35       T: 2022 17:58:58     [email or web address]  Job#: [id number]     Doc#: [id number]

## 2022-06-02 DIAGNOSIS — R06.09 DOE (DYSPNEA ON EXERTION): Primary | ICD-10-CM

## 2022-06-02 NOTE — TELEPHONE ENCOUNTER
Patient following up to get results of PFT>     Interpretation documented by Dr. Lb Umaña on PFT Report dated 5-

## 2022-06-02 NOTE — TELEPHONE ENCOUNTER
Please call patient. From 5/26/2022 pulmonary function test per pulmonology: Lung volumes show total capacity mildly reduced. No significant response to the bronchodilators/asthma medicine. .  Diagnosis of mild restrictive lung disease of questionable significance. I suggest official consult to pulmonary for evaluation. Consult has been ordered in [facility].   Give patient phone number to make appointment  Dr. Jose R Rothman

## 2022-06-15 RX ORDER — FLUOXETINE HYDROCHLORIDE 20 MG/1
CAPSULE ORAL
Qty: 90 CAPSULE | Refills: 1 | Status: SHIPPED | OUTPATIENT
Start: 2022-06-15

## 2022-06-15 RX ORDER — BUPROPION HYDROCHLORIDE 150 MG/1
TABLET, EXTENDED RELEASE ORAL
Qty: 90 TABLET | Refills: 1 | Status: SHIPPED | OUTPATIENT
Start: 2022-06-15

## 2022-06-15 NOTE — TELEPHONE ENCOUNTER
Refill request for [facility] FLUOXETINE medication.      Name of Pharmacy- [facility]      Last visit - 4/21/22     Pending visit - 7/21/22    Last refill -2/8/22      Medication Contract signed -   Last Oarrs ran-         Additional Comments

## 2022-07-18 ENCOUNTER — OFFICE VISIT (OUTPATIENT)
Dept: PULMONOLOGY | Age: 77
End: 2022-07-18
Payer: MEDICARE

## 2022-07-18 VITALS
DIASTOLIC BLOOD PRESSURE: 72 MMHG | TEMPERATURE: 96.9 F | OXYGEN SATURATION: 95 % | RESPIRATION RATE: 16 BRPM | WEIGHT: 149 LBS | HEIGHT: 64 IN | SYSTOLIC BLOOD PRESSURE: 133 MMHG | HEART RATE: 100 BPM | BODY MASS INDEX: 25.44 KG/M2

## 2022-07-18 DIAGNOSIS — R06.09 DYSPNEA ON EXERTION: ICD-10-CM

## 2022-07-18 DIAGNOSIS — J98.4 RESTRICTIVE LUNG DISEASE: Primary | ICD-10-CM

## 2022-07-18 PROCEDURE — 1123F ACP DISCUSS/DSCN MKR DOCD: CPT | Performed by: INTERNAL MEDICINE

## 2022-07-18 PROCEDURE — 99204 OFFICE O/P NEW MOD 45 MIN: CPT | Performed by: INTERNAL MEDICINE

## 2022-07-18 RX ORDER — ALBUTEROL SULFATE 90 UG/1
2 AEROSOL, METERED RESPIRATORY (INHALATION) 4 TIMES DAILY PRN
Qty: 54 G | Refills: 1 | Status: SHIPPED | OUTPATIENT
Start: 2022-07-18

## 2022-07-18 ASSESSMENT — ENCOUNTER SYMPTOMS
SHORTNESS OF BREATH: 1
WHEEZING: 0
SORE THROAT: 0
ORTHOPNEA: 0
ABDOMINAL PAIN: 0
VOMITING: 0
RHINORRHEA: 0
HEMOPTYSIS: 0
SPUTUM PRODUCTION: 0
SWOLLEN GLANDS: 0

## 2022-07-18 NOTE — PATIENT INSTRUCTIONS
ASSESSMENT/PLAN:   Diagnosis Orders   1. Restrictive lung disease        2. Dyspnea on exertion               Echo done 2/22/22    Conclusions      Summary   Baseline resting EKG shows NSR. Poor R wave progression. Dobutamine stress echo is performed. Peak heart rate achieved at 126bpm( 88%   of predicted maximum HR. No cardiac arrhythmia. No symptoms with dobutamine. The patient had a Dobutamine Stress No EKG changes of stress induced left   ventricular ischemia. Stress echocardiogram is performed with dobutamine infusion. Baseline echocardiogram shows normal left ventricular function with an   estimated ejection fraction of 55% . Following the dobutamine infusion there   was augmentation of all segments with no areas of stress induced   hypokinesis. Normal dobutamine stress echocardiogram.       PFT done 5/26/222      DATE OF PROCEDURE:  05/26/2022     PFT INTERPRETATION     The patient is 68-year-old female who underwent a PFT for dyspnea on  exertion. Spirometry shows FVC to be 75%, FEV1 to be 77%, FEV1 to FVC  ratio was 103%, CTG06-97% was 83%. The patient did not have any  significant postbronchodilator improvement. Lung volume shows the total  capacity was mildly reduced at 73%. The patient does not have any air  trapping or hyperinflation. The patient's diffusion capacity when  adjusted for volume was normal.  The patient's flow-volume loop was  normal.  On the basis of this PFT, the patient has mild restrictive lung  disease. Please correlate clinically. Cxr doen 1/20/22     Impression   Bibasilar atelectasis versus infiltrates     Options at this time    Give Albuterol 2 puffs as needed to use when short of breath  Cardiopulmonary excerise testing  Could do oxygen- noct pulse ox, call me for results, could need oxygen  Sleep study      Will do options   And  3.        RTC in 5-6 weeks    Remember to bring a list of pulmonary medications and any CPAP or BiPAP machines to your next appointment with the office. Please keep all of your future appointments scheduled by [address], [facility] office. Out of respect for other patients and providers, you may be asked to reschedule your appointment if you arrive later than your scheduled appointment time. Appointments cancelled less than 24hrs in advance will be considered a no show. Patients with three missed appointments within 1 year or four missed appointments within 2 years can be dismissed from the practice. Please be aware that our physicians are required to work in the Intensive Care Unit at [facility].  Your appointment may need to be rescheduled if they are designated to work during your appointment time. You may receive a survey regarding the care you received during your visit. Your input is valuable to us. We encourage you to complete and return your survey. We hope you will choose us in the future for your healthcare needs. Pt instructed of all future appointment dates & times, including radiology, labs, procedures & referrals. If procedures were scheduled preparation instructions provided. Instructions on future appointments with [facility] were given.

## 2022-07-18 NOTE — LETTER
[facility] Critical [facility]  [address] [address]  Phone: [phone]  Fax: [phone]    Jonathan Vazquez MD        July 18, 2022     Patient: Shari Carbajal   YOB: 1945   Date of Visit: 7/18/2022 7/18/22        Shari Carbajal      I have seen this patient in the office today and wanted to communicate my findings and recommendations. Patient Instructions         ASSESSMENT/PLAN:   Diagnosis Orders   1. Restrictive lung disease        2. Dyspnea on exertion               Echo done 2/22/22    Conclusions      Summary   Baseline resting EKG shows NSR. Poor R wave progression. Dobutamine stress echo is performed. Peak heart rate achieved at 126bpm( 88%   of predicted maximum HR. No cardiac arrhythmia. No symptoms with dobutamine. The patient had a Dobutamine Stress No EKG changes of stress induced left   ventricular ischemia. Stress echocardiogram is performed with dobutamine infusion. Baseline echocardiogram shows normal left ventricular function with an   estimated ejection fraction of 55% . Following the dobutamine infusion there   was augmentation of all segments with no areas of stress induced   hypokinesis. Normal dobutamine stress echocardiogram.       PFT done 5/26/222      DATE OF PROCEDURE:  05/26/2022     PFT INTERPRETATION     The patient is 64-year-old female who underwent a PFT for dyspnea on  exertion. Spirometry shows FVC to be 75%, FEV1 to be 77%, FEV1 to FVC  ratio was 103%, WSD34-25% was 83%. The patient did not have any  significant postbronchodilator improvement. Lung volume shows the total  capacity was mildly reduced at 73%. The patient does not have any air  trapping or hyperinflation. The patient's diffusion capacity when  adjusted for volume was normal.  The patient's flow-volume loop was  normal.  On the basis of this PFT, the patient has mild restrictive lung  disease.   Please correlate clinically. Cxr doen 1/20/22     Impression   Bibasilar atelectasis versus infiltrates     Options at this time    1. Give Albuterol 2 puffs as needed to use when short of breath  2. Cardiopulmonary excerise testing  3. Could do oxygen- noct pulse ox, call me for results, could need oxygen  4. Sleep study      Will do options   1. And  3.        RTC in 5-6 weeks                   Thank you for allowing me to assist in the care of the MD Genesis Fuller MD

## 2022-07-18 NOTE — PROGRESS NOTES
Cristy Van (: 1945 ) is a 64 y.o. female here for an evaluation of   Chief Complaint   Patient presents with    Shortness of Breath     New patient         ASSESSMENT/PLAN:   Diagnosis Orders   1. Restrictive lung disease        2. Dyspnea on exertion               Echo done 22    Conclusions      Summary   Baseline resting EKG shows NSR. Poor R wave progression. Dobutamine stress echo is performed. Peak heart rate achieved at 126bpm( 88%   of predicted maximum HR. No cardiac arrhythmia. No symptoms with dobutamine. The patient had a Dobutamine Stress No EKG changes of stress induced left   ventricular ischemia. Stress echocardiogram is performed with dobutamine infusion. Baseline echocardiogram shows normal left ventricular function with an   estimated ejection fraction of 55% . Following the dobutamine infusion there   was augmentation of all segments with no areas of stress induced   hypokinesis. Normal dobutamine stress echocardiogram.       PFT done       DATE OF PROCEDURE:  2022     PFT INTERPRETATION     The patient is 58-year-old female who underwent a PFT for dyspnea on  exertion. Spirometry shows FVC to be 75%, FEV1 to be 77%, FEV1 to FVC  ratio was 103%, NLI11-50% was 83%. The patient did not have any  significant postbronchodilator improvement. Lung volume shows the total  capacity was mildly reduced at 73%. The patient does not have any air  trapping or hyperinflation. The patient's diffusion capacity when  adjusted for volume was normal.  The patient's flow-volume loop was  normal.  On the basis of this PFT, the patient has mild restrictive lung  disease. Please correlate clinically.           Cxr doen 22     Impression   Bibasilar atelectasis versus infiltrates     Options at this time    Give Albuterol 2 puffs as needed to use when short of breath  Cardiopulmonary excerise testing  Could do oxygen- noct pulse ox, call me for results, could need oxygen  Sleep study      Will do options   And  3. RTC in 5-6 weeks        No follow-ups on file. I have personally reviewed and summarized the old records and/or obtained further history from someone other than the patient. I have had a discussion with another health care provider    I have independently reviewed the images and reviewed with patient    I have reviewed the lab tests, radiology reports and medications      Reviewed present meds and side effects. Continue present meds. Stay compliant. Call if worsens. Reviewed proper inhaler usage             [address] [facility]  [address] [facility]  [address] [address]  Dept: [phone]  Loc: [phone]     Diagnosis:  [] VIRGIL (ICD-10: G47.33)  o CSA (ICD-10: G47.31)  [] Complex Sleep Apnea (ICD-10: G47.37)  []  (ICD-10: G47.37)  [] Hypoxemia (ICD-10: R09.02)  [] COPD (J44.90)  [] Chronic Respiratory Failure with hypoxemia (J96.11)  [] Chronicr Respiratory Failure with hypercapnia (J96.12)  [x] Restrictive Lung Disease (J98.4)      [] New Rx (Device Preference: _________________________)     [] Change Order       [] Replace ___________  [] Clinical assessments and may include IN-Check device, spirometry and ETCO2 PRN    [] Discontinue Order -  [] CPAP    [] BPAP    [] PAP    [] Oxygen   /   AMA?  [] Yes   [] No              Therapy    AHI: ________ KUSHAL: ________  LOW SpO2: ________%      DME: patient aids    DEVICE / SETTINGS RAMP / Blossom Cruise   []  CPAP () Pressure    Ramp: _________ min's  [] Ramp to patient preference General PAP Supply Kit  Medicaid does not cover heated tubing  ([employer])  PAP Tubing:  [] Heated ()- 1/3 mos                         [] Regular () - 1/3 mos  [] Disposable Water Chamber () - 1/6 mos  [] Disposable Filter () - 2/mo  [] Non-Disposable Filter () - 1/6 mos   []  BiLevel PAP () IPAP        []  BiLevel PAP with   ()       Backup Rate ()      EPAP Rate  [] Adjust FLEX to patient comfort       SUPPLEMENTAL OXYGEN  [] OXYGEN:      Liter/min: _________  [] Continuous        [] Nocturnal  [] Bleed into PAP Device      []  EchoStar  Min Press                   Max Press   Mask Interface Kit    [] Mask interface () - 1/3 mos  [] Nasal Cushion () - 2/mo  [] Nasal Pillows ()  -2/mo  [] Headgear () - 1/6 mos   []  AutoBiLevel () Pressure  ()      Support           []  ResMed® IVAPS EPAP  [x] Overnight Oximetry on Room Air  [] Overnight Oximetry on PAP Therapy  [] Overnight Oximetry on ___ LPM of oxygen  []  Overnight Oximetry on  PAP therapy with _____ lpm of O2    Target Pt Rate  Min PS      Target Va  Patient Ht  Ti Max                Ti Min        Rise time  Max PS  Trigger  Cycle  Epap  Epap max  Epap min  The patient has a history of:  [] Excessive Daytime Sleepiness  [] Insomnia  [] Impaired Cognition  [] Ischemic Heart Disease  [] Hypertension  [] Mood Disorders          [] History of Stroke  Additional Orders:______________________________________________________________________________________________________________    [] Titrate to comfort  [] Add cloud access via Fire[employer] or [employer] Full Face Mask Kit    [] Full face mask () - 1/3 mos  [] Full Face Cushion () - 1/mo  [] Headgear () - 1/6 mos                                           [] Respironics® ASV Advanced ()     EPAP Min  PS Min      EPAP Max  PS Max   Additional Supplies    [] Chin Strap ()  [] Heated Humidifier Kit ()  Mask Specifications:   [] Patient Preference      -or-            Brand:______________ Size:_______________   Type: __________________________________   [] Mask Refit:___________________________  [] Mask Fitting:  [] Full     [] Nasal                []  Pillows                                Max Press  Ramp Time      Rate  Bi-flex: []1  []2  []3     [] Respironics® AVAPS: ()     IPAP Min  IPAP Max  Pressure Max  Epap max  Epap min  Rise time                      Avaps rate  EPAP   Additional Services    [] Annual PRN service and check of equipment  [] Routine service and check of equipment  [] Download and report compliance data   Tidal volume      Tigger                                     Rate                                         Inspiratory time                                                         The following equipment is Medically Necessary for the above stated patient. It is reasonable and necessary in reference to acceptable standards of medical practice for this condition, and is not prescribed as a convenience. Frequency of Use:    Daily                 Length of Need: 13 Months              o The patient requires BiLevel PAP and the following apply: []  The patient requires a Respiratory Assist Device (RAD) and the following apply:   o CPAP was tried and failed to meet therapeutic goals. [] CPAP was tried, but failed to meet therapeutic goals   o The prescribed mask interface has been properly fit, is the most comfortable to the patient and will be used with the BPAP device. [] The prescribed mask interface has been properly fit, is the most comfortable to the patient and will be used with the RAD.   o Current CPAP setting prevents patient from tolerating the therapy and lower CPAP settings fail to adequately control the symptoms of VIRGIL, improve sleep quality, or reduce AHI to acceptable levels. [] Current CPAP setting prevent patient from tolerating the therapy and lower PAP settings fail to  adequately control VIRGIL symptoms, improve sleep quality, or reduce AHI to acceptable levels.          [] There is significant improvement of the respiratory events on the RAD MD ANJEL Chaves- [phone]     [facility]- [phone]                    07/18/22       ____________________________                        _______________________           Physician Signature                                                         Date                                                     SUBJECTIVE/OBJECTIVE:    Consult from Dr. Shan Yang  For shortness of breath  Initially seen on 7/18/2022      Since in 2015  Had stress test was fine  And then echo in 2022 and this fine        Notices more with going up and down the stairs   Mostly with exertion    No chest pain  No sob    Does have GERD    Fhx  cad      No issues with sleepiness  Taking Trazadone    Does snore at night      Worse with:  Exertion  Humidity      Better with  Resting      Has not tired inhalers    Shortness of Breath  This is a chronic problem. The current episode started more than 1 year ago. The problem has been waxing and waning. Associated symptoms include a rash. Pertinent negatives include no abdominal pain, chest pain, claudication, coryza, ear pain, fever, headaches, hemoptysis, leg pain, leg swelling, neck pain, orthopnea, PND, rhinorrhea, sore throat, sputum production, swollen glands, syncope, vomiting or wheezing. Review of Systems   Constitutional:  Negative for fever. HENT:  Negative for ear pain, rhinorrhea and sore throat. Respiratory:  Positive for shortness of breath. Negative for hemoptysis, sputum production and wheezing. Cardiovascular:  Negative for chest pain, orthopnea, claudication, leg swelling, syncope and PND. Gastrointestinal:  Negative for abdominal pain and vomiting. Musculoskeletal:  Negative for neck pain. Skin:  Positive for rash. Neurological:  Negative for headaches.        Vitals:    07/18/22 1339   BP: 133/72   Site: Left Upper Arm   Position: Sitting   Cuff Size: Medium Adult   Pulse: 100 Resp: 16   Temp: 96.9 °F (36.1 °C)   TempSrc: Temporal   SpO2: 95%   Weight: 149 lb (67.6 kg)   Height: 5' 4\" (1.626 m)        Physical Exam  Vitals and nursing note reviewed. Constitutional:       General: She is not in acute distress. Appearance: Normal appearance. She is not ill-appearing. HENT:      Head: Normocephalic and atraumatic. Right Ear: External ear normal.      Left Ear: External ear normal.      Nose: Nose normal.      Mouth/Throat:      Mouth: Mucous membranes are moist.      Pharynx: Oropharynx is clear. Comments: 3Mallampati 3  Eyes:      General: No scleral icterus. Extraocular Movements: Extraocular movements intact. Conjunctiva/sclera: Conjunctivae normal.      Pupils: Pupils are equal, round, and reactive to light. Cardiovascular:      Rate and Rhythm: Normal rate and regular rhythm. Pulses: Normal pulses. Heart sounds: Normal heart sounds. No murmur heard. No friction rub. Pulmonary:      Effort: No respiratory distress. Breath sounds: No stridor. No wheezing, rhonchi or rales. Chest:      Chest wall: No tenderness. Abdominal:      General: Abdomen is flat. Bowel sounds are normal. There is no distension. Tenderness: There is no abdominal tenderness. There is no guarding. Musculoskeletal:         General: No swelling or tenderness. Normal range of motion. Cervical back: Normal range of motion and neck supple. No rigidity. Skin:     General: Skin is warm and dry. Coloration: Skin is not jaundiced. Neurological:      General: No focal deficit present. Mental Status: She is alert and oriented to person, place, and time. Mental status is at baseline. Cranial Nerves: No cranial nerve deficit. Sensory: No sensory deficit. Motor: No weakness. Gait: Gait normal.   Psychiatric:         Mood and Affect: Mood normal.         Thought Content:  Thought content normal.         Judgment: Judgment normal. Destini Giraldo MD

## 2022-07-20 ENCOUNTER — HOSPITAL ENCOUNTER (OUTPATIENT)
Age: 77
Discharge: HOME | End: 2022-07-20
Payer: MEDICARE

## 2022-07-20 DIAGNOSIS — E78.5 HYPERLIPIDEMIA, UNSPECIFIED HYPERLIPIDEMIA TYPE: ICD-10-CM

## 2022-07-20 LAB
A/G RATIO: 1.8 (ref 1.1–2.2)
ALBUMIN SERPL-MCNC: 4.2 G/DL (ref 3.4–5)
ALP BLD-CCNC: 65 U/L (ref 40–129)
ALT SERPL-CCNC: 10 U/L (ref 10–40)
ANION GAP SERPL CALCULATED.3IONS-SCNC: 10 MMOL/L (ref 3–16)
AST SERPL-CCNC: 17 U/L (ref 15–37)
BILIRUB SERPL-MCNC: 0.4 MG/DL (ref 0–1)
BUN BLDV-MCNC: 23 MG/DL (ref 7–20)
CALCIUM SERPL-MCNC: 9.4 MG/DL (ref 8.3–10.6)
CHLORIDE BLD-SCNC: 103 MMOL/L (ref 99–110)
CHOLESTEROL, TOTAL: 170 MG/DL (ref 0–199)
CO2: 28 MMOL/L (ref 21–32)
CREAT SERPL-MCNC: 1.1 MG/DL (ref 0.6–1.2)
GFR AFRICAN AMERICAN: 58
GFR NON-AFRICAN AMERICAN: 48
GLUCOSE BLD-MCNC: 99 MG/DL (ref 70–99)
HDLC SERPL-MCNC: 55 MG/DL (ref 40–60)
LDL CHOLESTEROL CALCULATED: 90 MG/DL
POTASSIUM SERPL-SCNC: 4.6 MMOL/L (ref 3.5–5.1)
SODIUM BLD-SCNC: 141 MMOL/L (ref 136–145)
TOTAL PROTEIN: 6.6 G/DL (ref 6.4–8.2)
TRIGL SERPL-MCNC: 123 MG/DL (ref 0–150)
VLDLC SERPL CALC-MCNC: 25 MG/DL

## 2022-07-20 PROCEDURE — 80053 COMPREHEN METABOLIC PANEL: CPT

## 2022-07-20 PROCEDURE — 80061 LIPID PANEL: CPT

## 2022-07-20 PROCEDURE — 36415 COLL VENOUS BLD VENIPUNCTURE: CPT

## 2022-07-21 ENCOUNTER — OFFICE VISIT (OUTPATIENT)
Dept: INTERNAL MEDICINE CLINIC | Age: 77
End: 2022-07-21
Payer: MEDICARE

## 2022-07-21 VITALS
WEIGHT: 149.8 LBS | SYSTOLIC BLOOD PRESSURE: 120 MMHG | HEART RATE: 86 BPM | BODY MASS INDEX: 25.71 KG/M2 | TEMPERATURE: 96.9 F | DIASTOLIC BLOOD PRESSURE: 66 MMHG | OXYGEN SATURATION: 98 %

## 2022-07-21 DIAGNOSIS — K58.9 IRRITABLE BOWEL SYNDROME WITHOUT DIARRHEA: ICD-10-CM

## 2022-07-21 DIAGNOSIS — K21.00 GASTROESOPHAGEAL REFLUX DISEASE WITH ESOPHAGITIS WITHOUT HEMORRHAGE: ICD-10-CM

## 2022-07-21 DIAGNOSIS — F32.A DEPRESSION, UNSPECIFIED DEPRESSION TYPE: ICD-10-CM

## 2022-07-21 DIAGNOSIS — E78.5 HYPERLIPIDEMIA, UNSPECIFIED HYPERLIPIDEMIA TYPE: ICD-10-CM

## 2022-07-21 DIAGNOSIS — R06.09 DYSPNEA ON EXERTION: Primary | ICD-10-CM

## 2022-07-21 DIAGNOSIS — N18.30 STAGE 3 CHRONIC KIDNEY DISEASE, UNSPECIFIED WHETHER STAGE 3A OR 3B CKD (HCC): ICD-10-CM

## 2022-07-21 PROBLEM — J98.4 RESTRICTIVE LUNG DISEASE: Status: ACTIVE | Noted: 2022-05-26

## 2022-07-21 PROCEDURE — 3288F FALL RISK ASSESSMENT DOCD: CPT | Performed by: INTERNAL MEDICINE

## 2022-07-21 PROCEDURE — 1123F ACP DISCUSS/DSCN MKR DOCD: CPT | Performed by: INTERNAL MEDICINE

## 2022-07-21 PROCEDURE — 99215 OFFICE O/P EST HI 40 MIN: CPT | Performed by: INTERNAL MEDICINE

## 2022-07-21 SDOH — ECONOMIC STABILITY: FOOD INSECURITY: WITHIN THE PAST 12 MONTHS, THE FOOD YOU BOUGHT JUST DIDN'T LAST AND YOU DIDN'T HAVE MONEY TO GET MORE.: NEVER TRUE

## 2022-07-21 SDOH — ECONOMIC STABILITY: FOOD INSECURITY: WITHIN THE PAST 12 MONTHS, YOU WORRIED THAT YOUR FOOD WOULD RUN OUT BEFORE YOU GOT MONEY TO BUY MORE.: NEVER TRUE

## 2022-07-21 ASSESSMENT — PATIENT HEALTH QUESTIONNAIRE - PHQ9
SUM OF ALL RESPONSES TO PHQ QUESTIONS 1-9: 0
9. THOUGHTS THAT YOU WOULD BE BETTER OFF DEAD, OR OF HURTING YOURSELF: 0
SUM OF ALL RESPONSES TO PHQ9 QUESTIONS 1 & 2: 0
2. FEELING DOWN, DEPRESSED OR HOPELESS: 0
7. TROUBLE CONCENTRATING ON THINGS, SUCH AS READING THE NEWSPAPER OR WATCHING TELEVISION: 0
6. FEELING BAD ABOUT YOURSELF - OR THAT YOU ARE A FAILURE OR HAVE LET YOURSELF OR YOUR FAMILY DOWN: 0
SUM OF ALL RESPONSES TO PHQ QUESTIONS 1-9: 0
5. POOR APPETITE OR OVEREATING: 0
SUM OF ALL RESPONSES TO PHQ QUESTIONS 1-9: 0
4. FEELING TIRED OR HAVING LITTLE ENERGY: 0
1. LITTLE INTEREST OR PLEASURE IN DOING THINGS: 0
10. IF YOU CHECKED OFF ANY PROBLEMS, HOW DIFFICULT HAVE THESE PROBLEMS MADE IT FOR YOU TO DO YOUR WORK, TAKE CARE OF THINGS AT HOME, OR GET ALONG WITH OTHER PEOPLE: 0
SUM OF ALL RESPONSES TO PHQ QUESTIONS 1-9: 0
3. TROUBLE FALLING OR STAYING ASLEEP: 0
8. MOVING OR SPEAKING SO SLOWLY THAT OTHER PEOPLE COULD HAVE NOTICED. OR THE OPPOSITE, BEING SO FIGETY OR RESTLESS THAT YOU HAVE BEEN MOVING AROUND A LOT MORE THAN USUAL: 0

## 2022-07-21 ASSESSMENT — SOCIAL DETERMINANTS OF HEALTH (SDOH): HOW HARD IS IT FOR YOU TO PAY FOR THE VERY BASICS LIKE FOOD, HOUSING, MEDICAL CARE, AND HEATING?: NOT HARD AT ALL

## 2022-07-21 NOTE — RESULT ENCOUNTER NOTE
I reviewed laboratory data from 7/20/2022 we will discuss further with patient at upcoming office visit  Dr. Jadon Goldstein

## 2022-07-21 NOTE — PROGRESS NOTES
Lauryn Maria 64 y.o. female presents today for an Established patient for   Chief Complaint   Patient presents with    Follow-up     Follow up to lipid panel. Patient had labs done yesterday    Immunizations     Patient is due for Shingles vaccine (1 of 2), DTaP/Tdap/Td vaccine (1 - Tdap)            ASSESSMENT/PLAN    1. Dyspnea on exertion              cardiac work-up: Normal dobutamine stress test.  Referred to pulmonary. PFTs mild restrictive lung disease. Bronchoscopy right middle lobe atelectasis with bronchiectasis. Non-smoker. Patient declined further cardiology evaluation. Seen Dr. Karthik Clifford pulmonary to undergo nocturnal oxygen desaturation testing    2. Stage 3 chronic kidney disease, unspecified whether stage 3a or 3b CKD (HCC)         BUN 23 creatinine 1.1 GFR 48 stable               - Comprehensive Metabolic Panel; Future    3. Hyperlipidemia, unspecified hyperlipidemia type           lipids are at target  - Lipid Panel; Future    4. Depression, unspecified depression type                stable on present medicine    5. Irritable bowel syndrome without diarrhea                stable. 6. Gastroesophageal reflux disease with esophagitis without hemorrhage              baseline. Return in about 6 months (around 1/21/2023), or See FLOR   For AWV   ####, for LIPIDS   BUSCH. HPI:       Reviewed last office visit with me: 4/21/2022: BUSCH work-up: 2/22/2022 echo pharmacological stress test was normal dobutamine stress test.  Positive family history for coronary artery disease, hyperlipidemia, will evaluate further with cardiology consultation. Chronic kidney disease creatinine 1.2 continue to monitor. GERD was stable. Bronchiectasis patient non-smoker. Bronchoscopy 2014 Dr. Steve Booth right middle lobe atelectasis with bronchiectasis evaluate further with pulmonary function test.  Hyperlipidemia. Depression stable. IBS stable. Primary osteoarthritis left knee baseline.   Medicines and allergies reviewed. Reviewed laboratory data: 7/20/2022. Chemistry panel BUN 23. Creatinine 1.1. GFR 48. Lipid panel: Total cholesterol 170. LDL cholesterol 90. From 1/19/2022 creatinine 1.2. GFR 43. Health maintenance reviewed. Shingles. Tdap. AWV  Reviewed: Pulmonary function test 5/26/2022: On the basis of this PFT patient has mild restrictive lung disease. No significant postbronchodilator improvement. Reviewed 7/18/2022 pulmonary. Dr. Irina Butt. DX restrictive lung disease. Given albuterol inhaler to use as needed. Today:      She relates she did not see cardiology as was suggested for her sense of BUSCH. Previous cardiac testing was unremarkable. She did see pulmonary Dr. Irina Butt will be checking for nocturnal hypoxia./Desaturation. .  Her sense of dyspnea is at baseline    Results for orders placed or performed during the hospital encounter of 07/20/22   Comprehensive Metabolic Panel   Result Value Ref Range    Sodium 141 136 - 145 mmol/L    Potassium 4.6 3.5 - 5.1 mmol/L    Chloride 103 99 - 110 mmol/L    CO2 28 21 - 32 mmol/L    Anion Gap 10 3 - 16    Glucose 99 70 - 99 mg/dL    BUN 23 (H) 7 - 20 mg/dL    Creatinine 1.1 0.6 - 1.2 mg/dL    GFR Non- 48 (A) >60    GFR  58 (A) >60    Calcium 9.4 8.3 - 10.6 mg/dL    Total Protein 6.6 6.4 - 8.2 g/dL    Albumin 4.2 3.4 - 5.0 g/dL    Albumin/Globulin Ratio 1.8 1.1 - 2.2    Total Bilirubin 0.4 0.0 - 1.0 mg/dL    Alkaline Phosphatase 65 40 - 129 U/L    ALT 10 10 - 40 U/L    AST 17 15 - 37 U/L   Lipid Panel   Result Value Ref Range    Cholesterol, Total 170 0 - 199 mg/dL    Triglycerides 123 0 - 150 mg/dL    HDL 55 40 - 60 mg/dL    LDL Calculated 90 <100 mg/dL    VLDL Cholesterol Calculated 25 Not Established mg/dL              ROS:  Review of Systems   Constitutional: negative   HENT: negative   EYES: negative   Respiratory: PFTs: Mild restrictive disease.   Gastrointestinal: negative   Endocrine: negative   Musculoskeletal: negative   Skin: negative   Allergic/Immunological: negative   Hematological: negative   Psychiatric/Behavorial: negative   CV: BUSCH  CNS: negative   :Negative   S/E:Negative  Renal: Negative     Physical Exam:  Head/neck: Ears: Normal TM. No obstruction. Throat: Mask. Not examined. Thyroids not palpable. Neck: No lymphadenopathy. Eyes: EOMI, PERRLA with no nystagmus  Lungs: Clear to auscultation. CV: S1-S2 normal.   LOUIS murmur. Carotid: No bruit. Abdominal Examination: Bowel sounds present. Soft nontender. No mass no guarding or   rebound. Spine/extremities: No edema. No tenderness to palpation. Skin: No rash  CNS: Patient is alert, cooperative, moves all 4 limbs, ambulates without difficulty, light touch normal.  Good historian. Good orientation. Blood pressure 120/66, pulse 86, temperature 96.9 °F (36.1 °C), temperature source Temporal, weight 149 lb 12.8 oz (67.9 kg), SpO2 98 %, not currently breastfeeding.        Ryan Grant MD

## 2022-08-08 DIAGNOSIS — E78.5 HYPERLIPIDEMIA: ICD-10-CM

## 2022-08-08 RX ORDER — PRAVASTATIN SODIUM 20 MG
TABLET ORAL
Qty: 90 TABLET | Refills: 1 | Status: SHIPPED | OUTPATIENT
Start: 2022-08-08

## 2022-08-08 NOTE — TELEPHONE ENCOUNTER
Refill request for PRAVASTATIN 20MG TABLETS medication.      Name of [address]      Last visit - 7-     Pending visit - 1-    Last refill - 5-      Medication Contract signed -   Last Sedrick Alberts ran-         Additional Comments

## 2022-08-09 ENCOUNTER — TELEPHONE (OUTPATIENT)
Dept: PULMONOLOGY | Age: 77
End: 2022-08-09

## 2022-08-09 NOTE — TELEPHONE ENCOUNTER
Patient has not heard from the oxygen company for her testing. Please call patient and advise what to do next.

## 2022-08-09 NOTE — TELEPHONE ENCOUNTER
I actually faxed these orders to [facility] [employer] on 7/18/22 and have confirmation that it was received. I attempted to contact [employer] but wait was so long I had to hang up. I will try to call again tomorrow to check on status.

## 2022-08-09 NOTE — TELEPHONE ENCOUNTER
[facility] Carol Colmenares will check the status of the order.

## 2022-08-11 ENCOUNTER — HOSPITAL ENCOUNTER (OUTPATIENT)
Dept: WOMENS IMAGING | Age: 77
Discharge: HOME | End: 2022-08-11
Payer: MEDICARE

## 2022-08-11 VITALS — HEIGHT: 64 IN | BODY MASS INDEX: 25.1 KG/M2 | WEIGHT: 147 LBS

## 2022-08-11 DIAGNOSIS — Z12.31 VISIT FOR SCREENING MAMMOGRAM: ICD-10-CM

## 2022-08-11 PROCEDURE — 77063 BREAST TOMOSYNTHESIS BI: CPT

## 2022-08-11 NOTE — TELEPHONE ENCOUNTER
Spoke with [employer]. They did not receive the face sheet for this patient so they have not contacted the patient. Face Sheet printed and faxed.

## 2022-08-14 NOTE — RESULT ENCOUNTER NOTE
Please call patient. Mammogram from 8/12/2022   no mammographic evidence of malignancy. Negative.   Recommend repeat in 12 months  Dr. Saravia Persons

## 2022-08-15 ENCOUNTER — TELEPHONE (OUTPATIENT)
Dept: INTERNAL MEDICINE CLINIC | Age: 77
End: 2022-08-15

## 2022-08-24 DIAGNOSIS — G47.00 INSOMNIA, UNSPECIFIED TYPE: ICD-10-CM

## 2022-08-24 RX ORDER — TRAZODONE HYDROCHLORIDE 50 MG/1
TABLET ORAL
Qty: 135 TABLET | Refills: 1 | Status: SHIPPED | OUTPATIENT
Start: 2022-08-24

## 2022-08-24 NOTE — TELEPHONE ENCOUNTER
Refill request for TRAZODONE medication.      Name of [facility]- [facility]      Last visit - 7/21/22     Pending visit - 8/29/22    Last refill -9/7/21      Medication Contract signed -   Last [place] ran-         Additional Comments

## 2022-09-12 ENCOUNTER — OFFICE VISIT (OUTPATIENT)
Dept: PULMONOLOGY | Age: 77
End: 2022-09-12
Payer: MEDICARE

## 2022-09-12 VITALS
SYSTOLIC BLOOD PRESSURE: 108 MMHG | TEMPERATURE: 97.8 F | RESPIRATION RATE: 18 BRPM | HEART RATE: 100 BPM | OXYGEN SATURATION: 96 % | HEIGHT: 64 IN | BODY MASS INDEX: 25.1 KG/M2 | WEIGHT: 147 LBS | DIASTOLIC BLOOD PRESSURE: 66 MMHG

## 2022-09-12 DIAGNOSIS — G47.30 SLEEP APNEA, UNSPECIFIED TYPE: ICD-10-CM

## 2022-09-12 DIAGNOSIS — J98.4 RESTRICTIVE LUNG DISEASE: Primary | ICD-10-CM

## 2022-09-12 DIAGNOSIS — J47.9 BRONCHIECTASIS WITHOUT COMPLICATION (HCC): ICD-10-CM

## 2022-09-12 PROCEDURE — 1123F ACP DISCUSS/DSCN MKR DOCD: CPT | Performed by: NURSE PRACTITIONER

## 2022-09-12 PROCEDURE — 99214 OFFICE O/P EST MOD 30 MIN: CPT | Performed by: NURSE PRACTITIONER

## 2022-09-12 ASSESSMENT — SLEEP AND FATIGUE QUESTIONNAIRES
HOW LIKELY ARE YOU TO NOD OFF OR FALL ASLEEP WHILE LYING DOWN TO REST IN THE AFTERNOON WHEN CIRCUMSTANCES PERMIT: 3
HOW LIKELY ARE YOU TO NOD OFF OR FALL ASLEEP WHILE WATCHING TV: 3
ESS TOTAL SCORE: 12
HOW LIKELY ARE YOU TO NOD OFF OR FALL ASLEEP WHILE SITTING QUIETLY AFTER LUNCH WITHOUT ALCOHOL: 3
HOW LIKELY ARE YOU TO NOD OFF OR FALL ASLEEP WHILE SITTING AND TALKING TO SOMEONE: 0
HOW LIKELY ARE YOU TO NOD OFF OR FALL ASLEEP WHEN YOU ARE A PASSENGER IN A CAR FOR AN HOUR WITHOUT A BREAK: 0
HOW LIKELY ARE YOU TO NOD OFF OR FALL ASLEEP WHILE SITTING AND READING: 2
NECK CIRCUMFERENCE (INCHES): 14.5
HOW LIKELY ARE YOU TO NOD OFF OR FALL ASLEEP IN A CAR, WHILE STOPPED FOR A FEW MINUTES IN TRAFFIC: 0
HOW LIKELY ARE YOU TO NOD OFF OR FALL ASLEEP WHILE SITTING INACTIVE IN A PUBLIC PLACE: 1

## 2022-09-12 ASSESSMENT — ENCOUNTER SYMPTOMS
DIARRHEA: 0
EYE PAIN: 0
ABDOMINAL PAIN: 0
NAUSEA: 0
SHORTNESS OF BREATH: 1
CHEST TIGHTNESS: 0
VOMITING: 0
RHINORRHEA: 0
COUGH: 0
SORE THROAT: 0
APNEA: 1
WHEEZING: 0

## 2022-09-12 NOTE — PATIENT INSTRUCTIONS
HST ordered for evaluation of possible VIRGIL with abnormal overnight oximetry. Dr. Delmi Quinonez will call you with the results after the sleep study is completed. Advised to avoid driving if too sleepy to function safely and given a discussion of the risks of untreated apneas such as accidents, cognitive impairment, mood impairment, worsening high blood pressure, various cardiac disease and stroke. Call for increased Shortness of breath, sputum production, wheezing, or cough. May continue to use albuterol as needed for shortness of breath    Return to clinic in 4 months with Dr. Marisabel Dent will call to schedule your sleep study. If you have any questions the sleep center at can be reached at [phone].

## 2022-09-12 NOTE — PROGRESS NOTES
Macey Wynn is a 64 y.o. who comes in today for Follow-up (6 wk ) and Shortness of Breath   She completed her overnight oximetry on RA which shows need for night time oxygen therapy. Oxygen dropped to 79% at the lowest.   Discussed possible sleep apnea with pattern of desaturations and increased heart rate. States she is tired during the day and naps frequently. States she snores, wakes herself snorting at times.  complains of snoring. Up 1-2 times a night to urinate. She has hx of tonsilectomy. No histroy sinus or nasal surgeries. Denies RLS symptoms. Denies sleep walking, talking. She has not used her  albuterol for her SOB. Today, states her breathing is at her baseline. Patient's [place] Sleepiness score: 12.  Patient  is  CONSISTENT with severe daytime sleepiness. Past Medical History:   Diagnosis Date    Anxiety     Arthritis     Cancer ([facility] [address]     skin    Cervical radiculopathy 05/2012    LT. c-5 c-6 MRI    Depression     Dupuytren's contracture     Dyspnea on exertion 02/22/2022    Echo pharmacological stress test: Normal dobutamine stress echocardiogram.    Dyspnea on exertion 02/22/2022    Dobutamine pharmacological stress test normal.    Fractures     Hyperlipidemia     Hyperlipidemia     IBS (irritable bowel syndrome)     Insomnia     Migraine     past hx    Reflux     Restrictive lung disease 05/26/2022    Pulmonary function test: DX restrictive lung disease. Seen by Dr. Max Mckeon 11/2013    Left Face.     SOB (shortness of breath) 03/23/2017    GXT  Normal        Past Surgical History:   Procedure Laterality Date    BRONCHOSCOPY  9/11/14    Right Middle Lobe Volume Loss:Negative Pathology    CATARACT REMOVAL      bilateral    CHOLECYSTECTOMY  07/29/14    DaVinci assisted laparoscopic cholecystetomy    COLONOSCOPY  03/2003    negative    DILATION AND CURETTAGE OF UTERUS      x 2    EYE SURGERY Bilateral     catarct    HAND SURGERY Right 10/19/2017    dupuytren's disease excision including middle and ring finger    KNEE ARTHROSCOPY      MOHS SURGERY      squamous cell    SKIN BIOPSY  11/19/2012    squamous cell nose    TONSILLECTOMY AND ADENOIDECTOMY      UPPER GASTROINTESTINAL ENDOSCOPY N/A 10/27/2020    EGD DIAGNOSTIC ONLY performed by Karen Roca MD at [facility]        Family History   Problem Relation Age of Onset    High Blood Pressure Mother     Heart Disease Mother [de-identified]        CABG    Diabetes Mother     Heart Disease Maternal Grandmother     Breast Cancer Paternal Aunt     Breast Cancer Paternal Aunt     Breast Cancer Paternal Aunt     Breast Cancer Paternal Cousin     Breast Cancer Paternal Cousin         Allergies   Allergen Reactions    Amoxicillin Rash    Entex Pse [Pseudoephedrine-Guaifenesin]      gittery         Current Outpatient Medications   Medication Sig Dispense Refill    traZODone (DESYREL) 50 MG tablet TAKE 1 AND 1/2 TABLETS BY MOUTH EVERY NIGHT AT BEDTIME FOR SLEEP 135 tablet 1    pravastatin (PRAVACHOL) 20 MG tablet TAKE 1 TABLET BY MOUTH EVERY EVENING FOR HIGH CHOLESTEROL 90 tablet 1    FLUoxetine (PROZAC) 20 MG capsule TAKE ONE CAPSULE BY MOUTH EVERY DAY. Depression medicine. 90 capsule 1    Cetirizine HCl (ZYRTEC PO) Take 1 tablet by mouth daily. albuterol sulfate HFA (VENTOLIN HFA) 108 (90 Base) MCG/ACT inhaler Inhale 2 puffs into the lungs 4 times daily as needed for Wheezing 54 g 1    buPROPion (WELLBUTRIN SR) 150 MG extended release tablet TAKE 1 TABLET BY MOUTH EVERY DAY FOR DEPRESSION 90 tablet 1    diclofenac sodium (VOLTAREN) 1 % GEL Apply 4 g topically 4 times daily as needed for Pain (Patient not taking: Reported on 7/18/2022) 100 g 3    hydrocortisone 2.5 % cream Apply sparingly to affected area(s) up to bid prn for several days until scaling and redness have subsided.  (Patient not taking: Reported on 9/12/2022) 30 g 0    omeprazole (PRILOSEC) 20 MG delayed release capsule Take 1 capsule by mouth 2 times daily (before meals) 180 capsule 1     No current facility-administered medications for this visit. Review of Systems   Constitutional:  Negative for chills, fatigue and fever. HENT:  Negative for congestion, postnasal drip, rhinorrhea and sore throat. Eyes:  Negative for pain and visual disturbance. Respiratory:  Positive for apnea and shortness of breath. Negative for cough, chest tightness and wheezing. Cardiovascular:  Negative for chest pain, palpitations and leg swelling. Gastrointestinal:  Negative for abdominal pain, diarrhea, nausea and vomiting. GERD, using precautions    Genitourinary:  Negative for difficulty urinating. Musculoskeletal: Negative. Skin: Negative. Neurological:  Negative for dizziness, numbness and headaches. Psychiatric/Behavioral:  The patient is not nervous/anxious. /66   Pulse 100   Temp 97.8 °F (36.6 °C) (Temporal)   Resp 18   Ht 5' 4\" (1.626 m)   Wt 147 lb (66.7 kg)   SpO2 96% Comment: ra  BMI 25.23 kg/m²     Neck 14.5 in    Imaging /Test:   DATE OF PROCEDURE:  05/26/2022     PFT INTERPRETATION     The patient is 78-year-old female who underwent a PFT for dyspnea on  exertion. Spirometry shows FVC to be 75%, FEV1 to be 77%, FEV1 to FVC  ratio was 103%, KWY87-63% was 83%. The patient did not have any  significant postbronchodilator improvement. Lung volume shows the total  capacity was mildly reduced at 73%. The patient does not have any air  trapping or hyperinflation. The patient's diffusion capacity when  adjusted for volume was normal.  The patient's flow-volume loop was  normal.  On the basis of this PFT, the patient has mild restrictive lung  disease. Please correlate clinically.       EXAMINATION:   TWO XRAY VIEWS OF THE CHEST       1/20/2022 2:22 pm       COMPARISON:   2016       HISTORY:   ORDERING SYSTEM PROVIDED HISTORY: BUSCH (dyspnea on exertion)   TECHNOLOGIST PROVIDED HISTORY:   Reason for exam:->BUSCH   Reason for Exam: dyspnea on exertion       FINDINGS:   Bibasilar atelectasis/infiltrates. Normal cardiac size. No pneumothorax. Moderate osteopenic changes and degenerative changes noted in the bony   structures. Impression   Bibasilar atelectasis versus infiltrates         ECHO stress test 2/22/22 from cardiology:    Conclusions      Summary   Baseline resting EKG shows NSR. Poor R wave progression. Dobutamine stress echo is performed. Peak heart rate achieved at 126bpm( 88%   of predicted maximum HR. No cardiac arrhythmia. No symptoms with dobutamine. The patient had a Dobutamine Stress No EKG changes of stress induced left   ventricular ischemia. Stress echocardiogram is performed with dobutamine infusion. Baseline echocardiogram shows normal left ventricular function with an   estimated ejection fraction of 55% . Following the dobutamine infusion there   was augmentation of all segments with no areas of stress induced   hypokinesis. Normal dobutamine stress echocardiogram.    Physical Exam  Vitals reviewed. Constitutional:       General: She is not in acute distress. Appearance: Normal appearance. She is not ill-appearing, toxic-appearing or diaphoretic. HENT:      Head: Normocephalic and atraumatic. Nose: Nose normal. No congestion or rhinorrhea. Mouth/Throat:      Mouth: Mucous membranes are moist.      Pharynx: Oropharynx is clear. No oropharyngeal exudate or posterior oropharyngeal erythema. Comments: Mallampati 3 with crowding  Eyes:      Pupils: Pupils are equal, round, and reactive to light. Cardiovascular:      Rate and Rhythm: Normal rate. Pulmonary:      Effort: Pulmonary effort is normal. No respiratory distress. Breath sounds: Normal breath sounds. No stridor. No wheezing, rhonchi or rales. Chest:      Chest wall: No tenderness. Abdominal:      Palpations: Abdomen is soft. Tenderness: There is no abdominal tenderness. There is no guarding or rebound. Musculoskeletal:         General: Normal range of motion. Cervical back: Neck supple. Right lower leg: No edema. Left lower leg: No edema. Lymphadenopathy:      Cervical: No cervical adenopathy. Skin:     General: Skin is warm and dry. Capillary Refill: Capillary refill takes less than 2 seconds. Neurological:      Mental Status: She is alert and oriented to person, place, and time. Psychiatric:         Mood and Affect: Mood normal.         Behavior: Behavior normal.         Thought Content: Thought content normal.         Judgment: Judgment normal.         Assessment/Plan:     1. Restrictive lung disease  Prior pulmonary OV note, PFT report and recent chest imaging report reviewed. I have personally reviewed and summarized the old records and/or obtained further history from someone other than the patient. Reviewed present meds and side effects. Reviewed proper inhaler usage. May continue to use albuterol as needed for shortness of breath. Discussed when to call with worsening symptoms such as increased shortness of breath, productive cough, wheezing or symptoms not responding to treatment plan. 2. Bronchiectasis without complication ([facility] [address]    3. Sleep apnea, unspecified type  -     Home Sleep Study; Future     HST ordered to determine severity of possible sleep apnea with abnormal overnight oximetry. Discussed if HST is normal, will need O2 at night. Advised to avoid driving if too sleepy to function safely and given a discussion of the risks of untreated apneas such as accidents, cognitive impairment, mood impairment, worsening high blood pressure, various cardiac disease and stroke.         Francis Winchester, APRN - CNP

## 2022-09-12 NOTE — PROGRESS NOTES
MA Communication:   The following orders are received by verbal communication from Oneil Mancilla MD    Orders include:    HST: TBS  Follow up: 4 month with Dr. Pham Gip

## 2022-11-01 ENCOUNTER — HOSPITAL ENCOUNTER (OUTPATIENT)
Dept: SLEEP CENTER | Age: 77
Discharge: HOME | End: 2022-11-01
Payer: MEDICARE

## 2022-11-01 DIAGNOSIS — G47.30 SLEEP APNEA, UNSPECIFIED TYPE: ICD-10-CM

## 2022-11-01 PROCEDURE — 95806 SLEEP STUDY UNATT&RESP EFFT: CPT

## 2022-11-02 ENCOUNTER — TELEPHONE (OUTPATIENT)
Dept: PULMONOLOGY | Age: 77
End: 2022-11-02

## 2022-11-02 PROBLEM — G47.30 SLEEP APNEA: Status: ACTIVE | Noted: 2022-11-02

## 2022-11-02 PROCEDURE — 95806 SLEEP STUDY UNATT&RESP EFFT: CPT | Performed by: INTERNAL MEDICINE

## 2022-11-02 NOTE — TELEPHONE ENCOUNTER
I have called and left a message for the patient that the sleep study did show mild form of sleep apnea  We will go ahead and get the AutoPap set up by [facility]           [address] [place] [address]  Dept: [phone]  Loc: [phone]     Diagnosis:  [x] VIRGIL (ICD-10: G47.33)  o CSA (ICD-10: G47.31)  [] Complex Sleep Apnea (ICD-10: G47.37)  []  (ICD-10: G47.37)  [] Hypoxemia (ICD-10: R09.02)  [] COPD (J44.90)  [] Chronic Respiratory Failure with hypoxemia (J96.11)  [] Chronicr Respiratory Failure with hypercapnia (J96.12)  [] Restrictive Lung Disease (J98.4)      [x] New Rx (Device Preference: _____Auto ResMed ____________________)     [] Change Order       [] Replace ___________  [] Clinical assessments and may include IN-Check device, spirometry and ETCO2 PRN    [] Discontinue Order -  [] CPAP    [] BPAP    [] PAP    [] Oxygen   /   AMA?  [] Yes   [] No              Therapy    AHI: ________ KUSHAL: ________  LOW SpO2: ________%      DME: [facility]    DEVICE / SETTINGS RAMP / COMFORT INTERFACE   []  CPAP () Pressure    Ramp: _________ min's  [] Ramp to patient preference General PAP Supply Kit  Medicaid does not cover heated tubing  ([employer])  PAP Tubing:  [x] Heated ()- 1/3 mos                         [x] Regular () - 1/3 mos  [x] Disposable Water Chamber () - 1/6 mos  [x] Disposable Filter () - 2/mo  [x] Non-Disposable Filter () - 1/6 mos   []  BiLevel PAP ()           IPAP        []  BiLevel PAP with   ()       Backup Rate ()      EPAP Rate  [] Adjust FLEX to patient comfort       SUPPLEMENTAL OXYGEN  [] OXYGEN:      Liter/min: _________  [] Continuous        [] Nocturnal  [] Bleed into PAP Device      [x]  Wolf Pyros Pictures 5                  Max Press 20  Mask Interface Kit    [x] Mask interface () - 1/3 mos  [x] Nasal Cushion () - 2/mo  [x] Nasal Pillows () -2/mo  [x] Headgear () - 1/6 mos   []  AutoBiLevel () Pressure  ()      Support           []  ResMed® IVAPS EPAP  [] Overnight Oximetry on Room Air  [] Overnight Oximetry on PAP Therapy  [] Overnight Oximetry on ___ LPM of oxygen  []  Overnight Oximetry on  PAP therapy with _____ lpm of O2    Target Pt Rate  Min PS      Target Va  Patient Ht  Ti Max                Ti Min        Rise time  Max PS  Trigger  Cycle  Epap  Epap max  Epap min  The patient has a history of:  [] Excessive Daytime Sleepiness  [] Insomnia  [] Impaired Cognition  [] Ischemic Heart Disease  [] Hypertension  [] Mood Disorders          [] History of Stroke  Additional Orders:______________________________________________________________________________________________________________    [] Titrate to comfort  [x] Add cloud access via [employer] Full Face Mask Kit    [] Full face mask () - 1/3 mos  [] Full Face Cushion () - 1/mo  [] Headgear () - 1/6 mos                                           [] Respironics® ASV Advanced ()     EPAP Min  PS Min      EPAP Max  PS Max   Additional Supplies    [] Chin Strap ()  [] Heated Humidifier Kit ()  Mask Specifications:   [] Patient Preference      -or-            Brand:______________ Size:_______________   Type: __________________________________   [] Mask Refit:___________________________  [x] Mask Fitting:  [] Full     [x] Nasal                [x]  Pillows                                Max Press  Ramp Time      Rate  Bi-flex: []1  []2  []3     [] Respironics® AVAPS: ()     IPAP Min  IPAP Max  Pressure Max  Epap max  Epap min  Rise time                      Avaps rate  EPAP   Additional Services    [] Annual PRN service and check of equipment  [] Routine service and check of equipment  [] Download and report compliance data   Tidal volume      Tigger                                     Rate Inspiratory time                                                         The following equipment is Medically Necessary for the above stated patient. It is reasonable and necessary in reference to acceptable standards of medical practice for this condition, and is not prescribed as a convenience. Frequency of Use:    Daily                 Length of Need: 13 Months              o The patient requires BiLevel PAP and the following apply: []  The patient requires a Respiratory Assist Device (RAD) and the following apply:   o CPAP was tried and failed to meet therapeutic goals. [] CPAP was tried, but failed to meet therapeutic goals   o The prescribed mask interface has been properly fit, is the most comfortable to the patient and will be used with the BPAP device. [] The prescribed mask interface has been properly fit, is the most comfortable to the patient and will be used with the RAD.   o Current CPAP setting prevents patient from tolerating the therapy and lower CPAP settings fail to adequately control the symptoms of VIRGIL, improve sleep quality, or reduce AHI to acceptable levels. [] Current CPAP setting prevent patient from tolerating the therapy and lower PAP settings fail to  adequately control VIRGIL symptoms, improve sleep quality, or reduce AHI to acceptable levels.          [] There is significant improvement of the respiratory events on the RAD                                                                                                                                                                                                                                  Harry Short MD NPI- [id number]     [place]- [phone]                    11/02/22       ____________________________                        _______________________           Physician Signature                                                         Date

## 2022-11-05 RX ORDER — BUPROPION HYDROCHLORIDE 150 MG/1
TABLET, EXTENDED RELEASE ORAL
Qty: 90 TABLET | Refills: 1 | Status: SHIPPED | OUTPATIENT
Start: 2022-11-05

## 2022-11-05 RX ORDER — FLUOXETINE HYDROCHLORIDE 20 MG/1
CAPSULE ORAL
Qty: 90 CAPSULE | Refills: 1 | Status: SHIPPED | OUTPATIENT
Start: 2022-11-05

## 2023-01-20 ENCOUNTER — OFFICE VISIT (OUTPATIENT)
Dept: PULMONOLOGY | Age: 78
End: 2023-01-20
Payer: MEDICARE

## 2023-01-20 VITALS
BODY MASS INDEX: 25.27 KG/M2 | OXYGEN SATURATION: 96 % | DIASTOLIC BLOOD PRESSURE: 75 MMHG | WEIGHT: 148 LBS | RESPIRATION RATE: 16 BRPM | HEIGHT: 64 IN | HEART RATE: 96 BPM | TEMPERATURE: 97.9 F | SYSTOLIC BLOOD PRESSURE: 120 MMHG

## 2023-01-20 DIAGNOSIS — J98.4 RESTRICTIVE LUNG DISEASE: Primary | ICD-10-CM

## 2023-01-20 DIAGNOSIS — J47.9 BRONCHIECTASIS WITHOUT COMPLICATION (HCC): ICD-10-CM

## 2023-01-20 DIAGNOSIS — G47.33 OBSTRUCTIVE SLEEP APNEA SYNDROME: ICD-10-CM

## 2023-01-20 PROCEDURE — 1123F ACP DISCUSS/DSCN MKR DOCD: CPT | Performed by: NURSE PRACTITIONER

## 2023-01-20 PROCEDURE — 99214 OFFICE O/P EST MOD 30 MIN: CPT | Performed by: NURSE PRACTITIONER

## 2023-01-20 ASSESSMENT — SLEEP AND FATIGUE QUESTIONNAIRES
HOW LIKELY ARE YOU TO NOD OFF OR FALL ASLEEP IN A CAR, WHILE STOPPED FOR A FEW MINUTES IN TRAFFIC: 0
HOW LIKELY ARE YOU TO NOD OFF OR FALL ASLEEP WHILE SITTING AND TALKING TO SOMEONE: 0
HOW LIKELY ARE YOU TO NOD OFF OR FALL ASLEEP WHILE SITTING INACTIVE IN A PUBLIC PLACE: 0
HOW LIKELY ARE YOU TO NOD OFF OR FALL ASLEEP WHILE LYING DOWN TO REST IN THE AFTERNOON WHEN CIRCUMSTANCES PERMIT: 3
HOW LIKELY ARE YOU TO NOD OFF OR FALL ASLEEP WHILE SITTING AND READING: 1
NECK CIRCUMFERENCE (INCHES): 12.2
HOW LIKELY ARE YOU TO NOD OFF OR FALL ASLEEP WHILE SITTING QUIETLY AFTER LUNCH WITHOUT ALCOHOL: 3
HOW LIKELY ARE YOU TO NOD OFF OR FALL ASLEEP WHEN YOU ARE A PASSENGER IN A CAR FOR AN HOUR WITHOUT A BREAK: 0
ESS TOTAL SCORE: 9
HOW LIKELY ARE YOU TO NOD OFF OR FALL ASLEEP WHILE WATCHING TV: 2

## 2023-01-20 ASSESSMENT — ENCOUNTER SYMPTOMS
EYE PAIN: 0
WHEEZING: 0
SHORTNESS OF BREATH: 0
SORE THROAT: 0
COUGH: 0
ABDOMINAL PAIN: 0
CHEST TIGHTNESS: 0
RHINORRHEA: 0

## 2023-01-20 NOTE — PROGRESS NOTES
Chief Complaint   Patient presents with    Follow-up     VIRGIL      Don Face comes in today for follow-up of PAP therapy and her RLD, bronchiectasis. She continues to use albuterol  as needed which is very sporadic. States her breathing is doing well. Diagnosed with mild obstructive sleep apnea on the study done 11/2022. (AHI 9.9, desat to 83%, weight 147 lbs)     Machine received December 2022. Patient had symptoms of EDS, snoring and poor sleep quality at time of diagnosis, resolved with pap therapy. Denies HA, ear popping or belching with PAP use. States she had Moh's surgery on her lip on 1/16/23 and has not been able to wear the last few days. Reports sleepiness is better. Is not having extended sleeping. Is using equipment for 8-9 hours a night. Up at night to use the bathroom about: 0-1  Is not snoring with machine. Does not have dry mouth   Is not complaining of mask issues  Is tolerating the pressure. Sleep Medicine 1/20/2023 9/12/2022   Sitting and reading 1 2   Watching TV 2 3   Sitting, inactive in a public place (e.g. a theatre or a meeting) 0 1   As a passenger in a car for an hour without a break 0 0   Lying down to rest in the afternoon when circumstances permit 3 3   Sitting and talking to someone 0 0   Sitting quietly after a lunch without alcohol 3 3   In a car, while stopped for a few minutes in traffic 0 0   [place] Sleepiness Score 9 12   Neck circumference (Inches) 12.2 14.5     0 = no chance of dozing  1 = slight chance of dozing  2 = moderate chance of dozing  3 = high chance of dozing    Interpretation:   0-7: It is unlikely that you are abnormally sleepy. 8-9:     You have an average amount of daytime sleepiness. 10-15: You may be excessively sleepy depending on the situation. You may want to consider seeking medical attention. 16-24:   You are excessively sleepy and should consider seeking medical attention      PAP Compliance report reviewed dates 12/20/22- 1/18/23:    PAP data -set on Min 5 , max 20 cm H20, 95% at 11.2  Days with usage 26/30 days   Days without device usage 4  Percent days with Device Usage 87 %   Percent of days with Usage greater than  4 hours 87 %   Average usage days used 8 hours 31 minutes     AHI 2.0      Current Outpatient Medications   Medication Sig Dispense Refill    buPROPion (WELLBUTRIN SR) 150 MG extended release tablet TAKE 1 TABLET BY MOUTH EVERY DAY FOR DEPRESSION 90 tablet 1    FLUoxetine (PROZAC) 20 MG capsule TAKE ONE CAPSULE BY MOUTH EVERY DAY Depression medicine. 90 capsule 1    traZODone (DESYREL) 50 MG tablet TAKE 1 AND 1/2 TABLETS BY MOUTH EVERY NIGHT AT BEDTIME FOR SLEEP 135 tablet 1    pravastatin (PRAVACHOL) 20 MG tablet TAKE 1 TABLET BY MOUTH EVERY EVENING FOR HIGH CHOLESTEROL 90 tablet 1    hydrocortisone 2.5 % cream Apply sparingly to affected area(s) up to bid prn for several days until scaling and redness have subsided. (Patient taking differently: Indications: As Needed Apply sparingly to affected area(s) up to bid prn for several days until scaling and redness have subsided.) 30 g 0    omeprazole (PRILOSEC) 20 MG delayed release capsule Take 1 capsule by mouth 2 times daily (before meals) 180 capsule 1    Cetirizine HCl (ZYRTEC PO) Take 1 tablet by mouth daily. albuterol sulfate HFA (VENTOLIN HFA) 108 (90 Base) MCG/ACT inhaler Inhale 2 puffs into the lungs 4 times daily as needed for Wheezing (Patient not taking: Reported on 1/20/2023) 54 g 1    diclofenac sodium (VOLTAREN) 1 % GEL Apply 4 g topically 4 times daily as needed for Pain (Patient not taking: No sig reported) 100 g 3     No current facility-administered medications for this visit.         Allergies   Allergen Reactions    Amoxicillin Rash    Entex Pse [Pseudoephedrine-Guaifenesin]      gittery          Past Medical History:   Diagnosis Date    Anxiety     Arthritis     Cancer ([facility] [address]     skin    Cervical radiculopathy 05/2012    LT. c-5 c-6 MRI    Depression     Dupuytren's contracture     Dyspnea on exertion 02/22/2022    Echo pharmacological stress test: Normal dobutamine stress echocardiogram.    Dyspnea on exertion 02/22/2022    Dobutamine pharmacological stress test normal.    Fractures     Hyperlipidemia     Hyperlipidemia     IBS (irritable bowel syndrome)     Insomnia     Migraine     past hx    Reflux     Restrictive lung disease 05/26/2022    Pulmonary function test: DX restrictive lung disease. Seen by Dr. Joseph Redd 11/2013    Left Face. SOB (shortness of breath) 03/23/2017    GXT  Normal        Past Surgical History:   Procedure Laterality Date    BRONCHOSCOPY  9/11/14    Right Middle Lobe Volume Loss:Negative Pathology    CATARACT REMOVAL      bilateral    CHOLECYSTECTOMY  07/29/14    DaVinci assisted laparoscopic cholecystetomy    COLONOSCOPY  03/2003    negative    DILATION AND CURETTAGE OF UTERUS      x 2    EYE SURGERY Bilateral     catarct    HAND SURGERY Right 10/19/2017    dupuytren's disease excision including middle and ring finger    KNEE ARTHROSCOPY      MOHS SURGERY      squamous cell    SKIN BIOPSY  11/19/2012    squamous cell nose    TONSILLECTOMY AND ADENOIDECTOMY      UPPER GASTROINTESTINAL ENDOSCOPY N/A 10/27/2020    EGD DIAGNOSTIC ONLY performed by Lisandro Martel MD at [place] [address]   Constitutional:  Negative for chills, fatigue and fever. HENT:  Negative for congestion, postnasal drip, rhinorrhea and sore throat. Eyes:  Negative for pain and visual disturbance. Respiratory:  Negative for cough, chest tightness, shortness of breath and wheezing. Cardiovascular:  Negative for chest pain, palpitations and leg swelling. Gastrointestinal:  Negative for abdominal pain. Genitourinary:  Negative for difficulty urinating. Musculoskeletal: Negative. Skin: Negative. Neurological:  Negative for dizziness, numbness and headaches.    Psychiatric/Behavioral:  The patient is not nervous/anxious. /75 (Site: Right Upper Arm, Position: Sitting, Cuff Size: Medium Adult)   Pulse 96   Temp 97.9 °F (36.6 °C) (Temporal)   Resp 16   Ht 5' 4\" (1.626 m)   Wt 148 lb (67.1 kg)   SpO2 96%   BMI 25.40 kg/m²     Additional Measurements    01/20/23 0900   Neck circumference (Inches): 12.2     Imaging/Test:   DATE OF PROCEDURE:  05/26/2022     PFT INTERPRETATION     The patient is 73-year-old female who underwent a PFT for dyspnea on  exertion. Spirometry shows FVC to be 75%, FEV1 to be 77%, FEV1 to FVC  ratio was 103%, EDB40-16% was 83%. The patient did not have any  significant postbronchodilator improvement. Lung volume shows the total  capacity was mildly reduced at 73%. The patient does not have any air  trapping or hyperinflation. The patient's diffusion capacity when  adjusted for volume was normal.  The patient's flow-volume loop was  normal.  On the basis of this PFT, the patient has mild restrictive lung  disease. Please correlate clinically. EXAMINATION:   TWO XRAY VIEWS OF THE CHEST       1/20/2022 2:22 pm       COMPARISON:   2016       HISTORY:   ORDERING SYSTEM PROVIDED HISTORY: BUSCH (dyspnea on exertion)   TECHNOLOGIST PROVIDED HISTORY:   Reason for exam:->BUSCH   Reason for Exam: dyspnea on exertion       FINDINGS:   Bibasilar atelectasis/infiltrates. Normal cardiac size. No pneumothorax. Moderate osteopenic changes and degenerative changes noted in the bony   structures. Impression   Bibasilar atelectasis versus infiltrates         Physical Exam  Vitals reviewed. Constitutional:       General: She is not in acute distress. Appearance: Normal appearance. She is not ill-appearing, toxic-appearing or diaphoretic. HENT:      Head: Normocephalic and atraumatic. Nose: Nose normal. No congestion or rhinorrhea. Mouth/Throat:      Mouth: Mucous membranes are moist.      Pharynx: Oropharynx is clear.  No oropharyngeal exudate or posterior oropharyngeal erythema. Eyes:      Pupils: Pupils are equal, round, and reactive to light. Cardiovascular:      Rate and Rhythm: Normal rate. Pulmonary:      Effort: Pulmonary effort is normal. No respiratory distress. Breath sounds: Normal breath sounds. No stridor. No wheezing, rhonchi or rales. Chest:      Chest wall: No tenderness. Abdominal:      Palpations: Abdomen is soft. Tenderness: There is no abdominal tenderness. There is no guarding or rebound. Musculoskeletal:         General: Normal range of motion. Cervical back: Neck supple. Right lower leg: No edema. Left lower leg: No edema. Lymphadenopathy:      Cervical: No cervical adenopathy. Skin:     General: Skin is warm and dry. Capillary Refill: Capillary refill takes less than 2 seconds. Neurological:      Mental Status: She is alert and oriented to person, place, and time. Psychiatric:         Mood and Affect: Mood normal.         Behavior: Behavior normal.         Thought Content: Thought content normal.         Judgment: Judgment normal.        Assessment/Plan:     1. Restrictive lung disease  2. Bronchiectasis without complication ([facility] [address]  3. Obstructive sleep apnea syndrome     Prior pulmonary OV note, PFT report and recent chest imaging report reviewed. Stable today. I have personally reviewed and summarized the old records and/or obtained further history from someone other than the patient. Reviewed present meds and side effects. Reviewed proper inhaler usage. She can continue albuterol as needed. Discussed when to call with worsening symptoms such as increased shortness of breath, productive cough, wheezing or symptoms not responding to treatment plan. Gato Bravo has good benefit and adherence on PAP therapy. Compliance report information was analyzed to assess complexity and medical decision making in regards to further testing and management.   Will prescribe home medical equipment company to check pressures, download usage and will replace mask, tubing, disposables and filters as needed. Discussed sleeping with HOB elevated until able to resume PAP therapy. Instructed to wash or wipe face of excess oil before using CPAP to prevent the mask / nasal pillow from sliding and ensure proper fit. Empty the water daily and allow the chamber and tubings to air dry. Instructed how to properly clean the device to prevent bacteria and mold growth in the water chamber. Advised to avoid driving if too sleepy to function safely and given a discussion of the risks of untreated apneas such as accidents, cognitive impairment, mood impairment, worsening high blood pressure, various cardiac disease and stroke. Follow up in 6 months  or sooner for any issues.       Ashli Davis, DANA - CNP

## 2023-01-20 NOTE — PATIENT INSTRUCTIONS
Call with worsening symptoms such as increased shortness of breath, productive cough, wheezing or symptoms not responding to treatment plan. Continue to use albuterol as needed for shortness of breath. Sleep with head of bed elevated until you can go back on PAP therapy. Josephine Banegas has good benefit and adherence on PAP therapy. Compliance report information was analyzed to assess complexity and medical decision making in regards to further testing and management. Will prescribe home medical equipment company to check pressures, download usage and will replace mask, tubing, disposables and filters as needed. Instructed to wash or wipe face of excess oil before using CPAP to prevent the mask / nasal pillow from sliding and ensure proper fit. Empty the water daily and allow the chamber and tubings to air dry. Instructed how to properly clean the device to prevent bacteria and mold growth in the water chamber. Advised to avoid driving if too sleepy to function safely and given a discussion of the risks of untreated apneas such as accidents, cognitive impairment, mood impairment, worsening high blood pressure, various cardiac disease and stroke. Follow up in 6 months  or sooner for any issues. Remember to bring a list of pulmonary medications and any CPAP or BiPAP machines to your next appointment with the office. Please keep all of your future appointments scheduled by [address] office. Out of respect for other patients and providers, you may be asked to reschedule your appointment if you arrive later than your scheduled appointment time. Appointments cancelled less than 24hrs in advance will be considered a no show. Patients with three missed appointments within 1 year or four missed appointments within 2 years can be dismissed from the practice.      Please be aware that our physicians are required to work in the Intensive Care Unit at [facility].  Your appointment may need to be rescheduled if they are designated to work during your appointment time.      You may receive a survey regarding the care you received during your visit.  Your input is valuable to us.  We encourage you to complete and return your survey.  We hope you will choose us in the future for your healthcare needs.     Pt instructed of all future appointment dates & times, including radiology, labs, procedures & referrals. If procedures were scheduled preparation instructions provided. Instructions on future appointments with [facility] were given.       MA Communication:  The following orders are received by verbal communication from Dee Spencer CNP

## 2023-01-24 ENCOUNTER — HOSPITAL ENCOUNTER (OUTPATIENT)
Age: 78
Discharge: HOME | End: 2023-01-24
Payer: MEDICARE

## 2023-01-24 LAB
A/G RATIO: 2 (ref 1.1–2.2)
ALBUMIN SERPL-MCNC: 4.1 G/DL (ref 3.4–5)
ALP BLD-CCNC: 64 U/L (ref 40–129)
ALT SERPL-CCNC: 12 U/L (ref 10–40)
ANION GAP SERPL CALCULATED.3IONS-SCNC: 15 MMOL/L (ref 3–16)
AST SERPL-CCNC: 18 U/L (ref 15–37)
BILIRUB SERPL-MCNC: 0.3 MG/DL (ref 0–1)
BUN BLDV-MCNC: 13 MG/DL (ref 7–20)
CALCIUM SERPL-MCNC: 9.5 MG/DL (ref 8.3–10.6)
CHLORIDE BLD-SCNC: 104 MMOL/L (ref 99–110)
CHOLESTEROL, TOTAL: 167 MG/DL (ref 0–199)
CO2: 24 MMOL/L (ref 21–32)
CREAT SERPL-MCNC: 1.2 MG/DL (ref 0.6–1.2)
GFR SERPL CREATININE-BSD FRML MDRD: 46 ML/MIN/{1.73_M2}
GLUCOSE BLD-MCNC: 99 MG/DL (ref 70–99)
HDLC SERPL-MCNC: 54 MG/DL (ref 40–60)
LDL CHOLESTEROL CALCULATED: 89 MG/DL
POTASSIUM SERPL-SCNC: 4.3 MMOL/L (ref 3.5–5.1)
SODIUM BLD-SCNC: 143 MMOL/L (ref 136–145)
TOTAL PROTEIN: 6.2 G/DL (ref 6.4–8.2)
TRIGL SERPL-MCNC: 120 MG/DL (ref 0–150)
VLDLC SERPL CALC-MCNC: 24 MG/DL

## 2023-01-24 PROCEDURE — 80061 LIPID PANEL: CPT

## 2023-01-24 PROCEDURE — 36415 COLL VENOUS BLD VENIPUNCTURE: CPT

## 2023-01-24 PROCEDURE — 80053 COMPREHEN METABOLIC PANEL: CPT

## 2023-01-26 ENCOUNTER — OFFICE VISIT (OUTPATIENT)
Dept: INTERNAL MEDICINE CLINIC | Age: 78
End: 2023-01-26

## 2023-01-26 VITALS
SYSTOLIC BLOOD PRESSURE: 120 MMHG | TEMPERATURE: 97 F | DIASTOLIC BLOOD PRESSURE: 80 MMHG | WEIGHT: 148 LBS | BODY MASS INDEX: 25.4 KG/M2

## 2023-01-26 DIAGNOSIS — R06.09 DYSPNEA ON EXERTION: Primary | ICD-10-CM

## 2023-01-26 DIAGNOSIS — N18.30 STAGE 3 CHRONIC KIDNEY DISEASE, UNSPECIFIED WHETHER STAGE 3A OR 3B CKD (HCC): ICD-10-CM

## 2023-01-26 DIAGNOSIS — E78.5 HYPERLIPIDEMIA, UNSPECIFIED HYPERLIPIDEMIA TYPE: ICD-10-CM

## 2023-01-26 DIAGNOSIS — F32.5 MAJOR DEPRESSIVE DISORDER IN FULL REMISSION, UNSPECIFIED WHETHER RECURRENT (HCC): ICD-10-CM

## 2023-01-26 DIAGNOSIS — K58.9 IRRITABLE BOWEL SYNDROME WITHOUT DIARRHEA: ICD-10-CM

## 2023-01-26 RX ORDER — FLUOXETINE HYDROCHLORIDE 20 MG/1
CAPSULE ORAL
Qty: 90 CAPSULE | Refills: 1 | Status: SHIPPED | OUTPATIENT
Start: 2023-01-26

## 2023-01-26 ASSESSMENT — PATIENT HEALTH QUESTIONNAIRE - PHQ9
8. MOVING OR SPEAKING SO SLOWLY THAT OTHER PEOPLE COULD HAVE NOTICED. OR THE OPPOSITE, BEING SO FIGETY OR RESTLESS THAT YOU HAVE BEEN MOVING AROUND A LOT MORE THAN USUAL: 0
1. LITTLE INTEREST OR PLEASURE IN DOING THINGS: 0
6. FEELING BAD ABOUT YOURSELF - OR THAT YOU ARE A FAILURE OR HAVE LET YOURSELF OR YOUR FAMILY DOWN: 0
9. THOUGHTS THAT YOU WOULD BE BETTER OFF DEAD, OR OF HURTING YOURSELF: 0
7. TROUBLE CONCENTRATING ON THINGS, SUCH AS READING THE NEWSPAPER OR WATCHING TELEVISION: 0
10. IF YOU CHECKED OFF ANY PROBLEMS, HOW DIFFICULT HAVE THESE PROBLEMS MADE IT FOR YOU TO DO YOUR WORK, TAKE CARE OF THINGS AT HOME, OR GET ALONG WITH OTHER PEOPLE: 0
SUM OF ALL RESPONSES TO PHQ QUESTIONS 1-9: 0
SUM OF ALL RESPONSES TO PHQ QUESTIONS 1-9: 0
2. FEELING DOWN, DEPRESSED OR HOPELESS: 0
SUM OF ALL RESPONSES TO PHQ9 QUESTIONS 1 & 2: 0
5. POOR APPETITE OR OVEREATING: 0
SUM OF ALL RESPONSES TO PHQ QUESTIONS 1-9: 0
4. FEELING TIRED OR HAVING LITTLE ENERGY: 0
3. TROUBLE FALLING OR STAYING ASLEEP: 0
SUM OF ALL RESPONSES TO PHQ QUESTIONS 1-9: 0

## 2023-01-26 NOTE — PROGRESS NOTES
Guillerminadavion Drew 76 y.o. female presents today for an Established patient for   Chief Complaint   Patient presents with    6 Month Follow-Up            ASSESSMENT/PLAN    1. Dyspnea on exertion          Restrictive lung disease. Follow-up with pulmonary    2. Major depressive disorder in full remission, unspecified whether recurrent ([address]                  Stable on present medicine.  - FLUoxetine (PROZAC) 20 MG capsule; TAKE ONE CAPSULE BY MOUTH EVERY DAY Depression medicine. Dispense: 90 capsule; Refill: 1    3. Stage 3 chronic kidney disease, unspecified whether stage 3a or 3b CKD (HCC)               Creatinine 1.2 stable. 4. Hyperlipidemia, unspecified hyperlipidemia type              On target. - Lipid Panel; Future  - Comprehensive Metabolic Panel; Future    5. Irritable bowel syndrome without diarrhea            Baseline. Return in about 6 months (around 7/26/2023), or See Dr IRWIN, for LIPIDS    IBS. HPI:               Reviewed last office visit with me: 7/21/2022.:  Patient with dyspnea on exertion cardiac work-up normal dobutamine stress test, referred to pulmonary. PFTs mild restrictive lung disease, bronchoscopy right middle lobe atelectasis and bronchiectasis. Non-smoker. Dr. Edda Enriquez upcoming nocturnal oxygen desaturation testing. Stage III CKD creatinine 1.1. Hyperlipidemia lipids at target. Depression stable. IBS stable. GERD baseline  Medicines and Allergies Reviewed:  Reviewed Laboratory Data: 1/24/2023 chemistry panel creatinine 1.2. GFR 46 slightly lower. Lipid panel: Total cholesterol: 167. LDL cholesterol 89. Reviewed Health Maintenance: AWV. Shingles. Tdap. Influenza vaccine. COVID-vaccine. 1/20/2023 office visit. pulmonary DANA Moreno Noss: Diagnosed with mild obstructive sleep apnea study 11/2022 AHI 9.9 desaturation 83% treatment: CPAP.   Patient with restrictive lung disease and bronchiectasis continues with albuterol  Dx restrictive lung disease, bronchiectasis without complication, obstructive sleep apnea syndrome. Today she notes dry mouth. Patient has been on trazodone for sleep. Complains of neck pain seen by chiropractor and I encouraged follow-up. Results for orders placed or performed during the hospital encounter of 01/24/23   Lipid Panel   Result Value Ref Range    Cholesterol, Total 167 0 - 199 mg/dL    Triglycerides 120 0 - 150 mg/dL    HDL 54 40 - 60 mg/dL    LDL Calculated 89 <100 mg/dL    VLDL Cholesterol Calculated 24 Not Established mg/dL   Comprehensive Metabolic Panel   Result Value Ref Range    Sodium 143 136 - 145 mmol/L    Potassium 4.3 3.5 - 5.1 mmol/L    Chloride 104 99 - 110 mmol/L    CO2 24 21 - 32 mmol/L    Anion Gap 15 3 - 16    Glucose 99 70 - 99 mg/dL    BUN 13 7 - 20 mg/dL    Creatinine 1.2 0.6 - 1.2 mg/dL    Est, Glom Filt Rate 46 (A) >60    Calcium 9.5 8.3 - 10.6 mg/dL    Total Protein 6.2 (L) 6.4 - 8.2 g/dL    Albumin 4.1 3.4 - 5.0 g/dL    Albumin/Globulin Ratio 2.0 1.1 - 2.2    Total Bilirubin 0.3 0.0 - 1.0 mg/dL    Alkaline Phosphatase 64 40 - 129 U/L    ALT 12 10 - 40 U/L    AST 18 15 - 37 U/L              ROS:  Review of Systems   Constitutional: negative   HENT: Complains of dry mouth. ?  Trazodone  EYES: negative   Respiratory: negative   Gastrointestinal: IBS stable. Endocrine: negative   Musculoskeletal: negative   Skin: negative   Allergic/Immunological: negative   Hematological: negative   Psychiatric/Behavorial: Depression stable. CV: Hyperlipidemia. CNS: negative   :Negative   S/E:Negative  Renal: Negative     Physical Exam:  Head/neck: Ears: Normal TM. No obstruction. Throat: Mask. Not examined. Thyroid is not palpable. Neck: No lymphadenopathy. Eyes: EOMI, PERRLA with no nystagmus  Lungs: Clear to auscultation. CV: S1-S2 normal.   LOUIS murmur. Carotid: No bruit. Abdominal Examination: Bowel sounds present. Soft nontender. No mass no guarding or   rebound.   Spine/extremities: Mild tenderness to palpation base of the neck.  Overall good range of motion. No edema. .     Skin: No rash  CNS: Patient is alert, cooperative, moves all 4 limbs, ambulates without difficulty, light touch normal.   Good historian. Good orientation. Blood pressure 120/80, temperature 97 °F (36.1 °C), weight 148 lb (67.1 kg), not currently breastfeeding.        Joshua Kruger MD

## 2023-02-03 DIAGNOSIS — E78.5 HYPERLIPIDEMIA: ICD-10-CM

## 2023-02-03 RX ORDER — PRAVASTATIN SODIUM 20 MG
TABLET ORAL
Qty: 90 TABLET | Refills: 1 | Status: SHIPPED | OUTPATIENT
Start: 2023-02-03

## 2023-05-16 ENCOUNTER — TELEPHONE (OUTPATIENT)
Dept: INTERNAL MEDICINE CLINIC | Age: 78
End: 2023-05-16

## 2023-05-17 ENCOUNTER — TELEPHONE (OUTPATIENT)
Dept: PULMONOLOGY | Age: 78
End: 2023-05-17

## 2023-05-17 DIAGNOSIS — G47.33 OBSTRUCTIVE SLEEP APNEA SYNDROME: Primary | ICD-10-CM

## 2023-05-17 NOTE — TELEPHONE ENCOUNTER
Informed patient and faxed new order to DME   Pt understands and will keep follow up visit with Dr. Ady Garcia

## 2023-05-17 NOTE — TELEPHONE ENCOUNTER
Your sleep apnea was mild however your oxygen levels did drop to the low 80's. We could try a different mask- I have placed an order for a mask fitting for your DME. Let's try a different mask and fu with Dr. Mackenzie Noyola in August and can discuss further options if still not tolerating.

## 2023-05-17 NOTE — TELEPHONE ENCOUNTER
Patient is thinking about of stopping using her cpap machine, she states its not doing her any good and she has problems wearing the mask, states she is claustrophobic. Please advise.

## 2023-07-13 RX ORDER — BUPROPION HYDROCHLORIDE 150 MG/1
TABLET, EXTENDED RELEASE ORAL
Qty: 90 TABLET | Refills: 1 | OUTPATIENT
Start: 2023-07-13

## 2023-07-14 DIAGNOSIS — G47.00 INSOMNIA, UNSPECIFIED TYPE: ICD-10-CM

## 2023-07-14 DIAGNOSIS — F32.5 MAJOR DEPRESSIVE DISORDER IN FULL REMISSION, UNSPECIFIED WHETHER RECURRENT (HCC): ICD-10-CM

## 2023-07-14 RX ORDER — FLUOXETINE HYDROCHLORIDE 20 MG/1
CAPSULE ORAL
Qty: 90 CAPSULE | Refills: 1 | Status: SHIPPED | OUTPATIENT
Start: 2023-07-14

## 2023-07-14 RX ORDER — TRAZODONE HYDROCHLORIDE 50 MG/1
TABLET ORAL
Qty: 135 TABLET | Refills: 1 | Status: SHIPPED | OUTPATIENT
Start: 2023-07-14

## 2023-07-14 RX ORDER — BUPROPION HYDROCHLORIDE 150 MG/1
TABLET, EXTENDED RELEASE ORAL
Qty: 90 TABLET | Refills: 1 | Status: SHIPPED | OUTPATIENT
Start: 2023-07-14

## 2023-07-14 NOTE — TELEPHONE ENCOUNTER
Refill request for medication.    BUPROPION  FLUOXETINE  TRAZODONE    Name of Nelia      Last visit - 1-26-23     Pending visit - 7-27-23    Last refill -11-5-22; 1-26-23; 8-24-22      Medication Contract signed -   Last Fadumo Tamayo ran-         Additional Comments

## 2023-07-26 ENCOUNTER — HOSPITAL ENCOUNTER (OUTPATIENT)
Age: 78
Discharge: HOME | End: 2023-07-26
Payer: MEDICARE

## 2023-07-26 DIAGNOSIS — E78.5 HYPERLIPIDEMIA, UNSPECIFIED HYPERLIPIDEMIA TYPE: ICD-10-CM

## 2023-07-26 LAB
ALBUMIN SERPL-MCNC: 4.1 G/DL (ref 3.4–5)
ALBUMIN/GLOB SERPL: 1.6 {RATIO} (ref 1.1–2.2)
ALP SERPL-CCNC: 55 U/L (ref 40–129)
ALT SERPL-CCNC: 11 U/L (ref 10–40)
ANION GAP SERPL CALCULATED.3IONS-SCNC: 7 MMOL/L (ref 3–16)
AST SERPL-CCNC: 20 U/L (ref 15–37)
BILIRUB SERPL-MCNC: 0.3 MG/DL (ref 0–1)
BUN SERPL-MCNC: 15 MG/DL (ref 7–20)
CALCIUM SERPL-MCNC: 9.2 MG/DL (ref 8.3–10.6)
CHLORIDE SERPL-SCNC: 104 MMOL/L (ref 99–110)
CHOLEST SERPL-MCNC: 170 MG/DL (ref 0–199)
CO2 SERPL-SCNC: 28 MMOL/L (ref 21–32)
CREAT SERPL-MCNC: 1.2 MG/DL (ref 0.6–1.2)
GFR SERPLBLD CREATININE-BSD FMLA CKD-EPI: 46 ML/MIN/{1.73_M2}
GLUCOSE SERPL-MCNC: 81 MG/DL (ref 70–99)
HDLC SERPL-MCNC: 57 MG/DL (ref 40–60)
LDLC SERPL CALC-MCNC: 91 MG/DL
POTASSIUM SERPL-SCNC: 4.2 MMOL/L (ref 3.5–5.1)
PROT SERPL-MCNC: 6.6 G/DL (ref 6.4–8.2)
SODIUM SERPL-SCNC: 139 MMOL/L (ref 136–145)
TRIGL SERPL-MCNC: 111 MG/DL (ref 0–150)
VLDLC SERPL CALC-MCNC: 22 MG/DL

## 2023-07-26 PROCEDURE — 80053 COMPREHEN METABOLIC PANEL: CPT

## 2023-07-26 PROCEDURE — 36415 COLL VENOUS BLD VENIPUNCTURE: CPT

## 2023-07-26 PROCEDURE — 80061 LIPID PANEL: CPT

## 2023-07-27 ENCOUNTER — OFFICE VISIT (OUTPATIENT)
Dept: INTERNAL MEDICINE CLINIC | Age: 78
End: 2023-07-27
Payer: MEDICARE

## 2023-07-27 VITALS
HEART RATE: 94 BPM | SYSTOLIC BLOOD PRESSURE: 122 MMHG | DIASTOLIC BLOOD PRESSURE: 78 MMHG | WEIGHT: 156 LBS | OXYGEN SATURATION: 96 % | HEIGHT: 64 IN | TEMPERATURE: 97.4 F | BODY MASS INDEX: 26.63 KG/M2

## 2023-07-27 DIAGNOSIS — G47.33 OSA (OBSTRUCTIVE SLEEP APNEA): ICD-10-CM

## 2023-07-27 DIAGNOSIS — J47.9 BRONCHIECTASIS WITHOUT COMPLICATION (HCC): ICD-10-CM

## 2023-07-27 DIAGNOSIS — N18.31 STAGE 3A CHRONIC KIDNEY DISEASE (HCC): ICD-10-CM

## 2023-07-27 DIAGNOSIS — K58.9 IRRITABLE BOWEL SYNDROME WITHOUT DIARRHEA: ICD-10-CM

## 2023-07-27 DIAGNOSIS — E78.2 MIXED HYPERLIPIDEMIA: ICD-10-CM

## 2023-07-27 DIAGNOSIS — F32.5 MAJOR DEPRESSIVE DISORDER IN FULL REMISSION, UNSPECIFIED WHETHER RECURRENT (HCC): Primary | ICD-10-CM

## 2023-07-27 DIAGNOSIS — K21.00 GASTROESOPHAGEAL REFLUX DISEASE WITH ESOPHAGITIS WITHOUT HEMORRHAGE: ICD-10-CM

## 2023-07-27 DIAGNOSIS — J98.4 RESTRICTIVE LUNG DISEASE: ICD-10-CM

## 2023-07-27 PROCEDURE — 1123F ACP DISCUSS/DSCN MKR DOCD: CPT | Performed by: STUDENT IN AN ORGANIZED HEALTH CARE EDUCATION/TRAINING PROGRAM

## 2023-07-27 PROCEDURE — 99214 OFFICE O/P EST MOD 30 MIN: CPT | Performed by: STUDENT IN AN ORGANIZED HEALTH CARE EDUCATION/TRAINING PROGRAM

## 2023-07-27 SDOH — ECONOMIC STABILITY: HOUSING INSECURITY
IN THE LAST 12 MONTHS, WAS THERE A TIME WHEN YOU DID NOT HAVE A STEADY PLACE TO SLEEP OR SLEPT IN A SHELTER (INCLUDING NOW)?: NO

## 2023-07-27 SDOH — ECONOMIC STABILITY: FOOD INSECURITY: WITHIN THE PAST 12 MONTHS, THE FOOD YOU BOUGHT JUST DIDN'T LAST AND YOU DIDN'T HAVE MONEY TO GET MORE.: NEVER TRUE

## 2023-07-27 SDOH — ECONOMIC STABILITY: FOOD INSECURITY: WITHIN THE PAST 12 MONTHS, YOU WORRIED THAT YOUR FOOD WOULD RUN OUT BEFORE YOU GOT MONEY TO BUY MORE.: NEVER TRUE

## 2023-07-27 SDOH — ECONOMIC STABILITY: INCOME INSECURITY: HOW HARD IS IT FOR YOU TO PAY FOR THE VERY BASICS LIKE FOOD, HOUSING, MEDICAL CARE, AND HEATING?: NOT HARD AT ALL

## 2023-07-27 NOTE — PROGRESS NOTES
MD    This dictation was generated by voice recognition computer software. Although all attempts are made to edit the dictation for accuracy, there may be errors in the transcription that are not intended.

## 2023-08-03 ENCOUNTER — TELEMEDICINE (OUTPATIENT)
Dept: INTERNAL MEDICINE CLINIC | Age: 78
End: 2023-08-03
Payer: MEDICARE

## 2023-08-03 DIAGNOSIS — Z00.00 MEDICARE ANNUAL WELLNESS VISIT, SUBSEQUENT: Primary | ICD-10-CM

## 2023-08-03 PROCEDURE — 1123F ACP DISCUSS/DSCN MKR DOCD: CPT | Performed by: STUDENT IN AN ORGANIZED HEALTH CARE EDUCATION/TRAINING PROGRAM

## 2023-08-03 PROCEDURE — G0439 PPPS, SUBSEQ VISIT: HCPCS | Performed by: STUDENT IN AN ORGANIZED HEALTH CARE EDUCATION/TRAINING PROGRAM

## 2023-08-03 ASSESSMENT — PATIENT HEALTH QUESTIONNAIRE - PHQ9
SUM OF ALL RESPONSES TO PHQ QUESTIONS 1-9: 0
SUM OF ALL RESPONSES TO PHQ QUESTIONS 1-9: 0
7. TROUBLE CONCENTRATING ON THINGS, SUCH AS READING THE NEWSPAPER OR WATCHING TELEVISION: 0
SUM OF ALL RESPONSES TO PHQ9 QUESTIONS 1 & 2: 0
6. FEELING BAD ABOUT YOURSELF - OR THAT YOU ARE A FAILURE OR HAVE LET YOURSELF OR YOUR FAMILY DOWN: 0
SUM OF ALL RESPONSES TO PHQ QUESTIONS 1-9: 0
5. POOR APPETITE OR OVEREATING: 0
4. FEELING TIRED OR HAVING LITTLE ENERGY: 0
8. MOVING OR SPEAKING SO SLOWLY THAT OTHER PEOPLE COULD HAVE NOTICED. OR THE OPPOSITE, BEING SO FIGETY OR RESTLESS THAT YOU HAVE BEEN MOVING AROUND A LOT MORE THAN USUAL: 0
SUM OF ALL RESPONSES TO PHQ QUESTIONS 1-9: 0
3. TROUBLE FALLING OR STAYING ASLEEP: 0
2. FEELING DOWN, DEPRESSED OR HOPELESS: 0
9. THOUGHTS THAT YOU WOULD BE BETTER OFF DEAD, OR OF HURTING YOURSELF: 0
1. LITTLE INTEREST OR PLEASURE IN DOING THINGS: 0

## 2023-08-03 ASSESSMENT — LIFESTYLE VARIABLES
HOW OFTEN DO YOU HAVE A DRINK CONTAINING ALCOHOL: NEVER
HOW MANY STANDARD DRINKS CONTAINING ALCOHOL DO YOU HAVE ON A TYPICAL DAY: PATIENT DOES NOT DRINK

## 2023-08-07 ENCOUNTER — TELEPHONE (OUTPATIENT)
Dept: ORTHOPEDIC SURGERY | Age: 78
End: 2023-08-07

## 2023-08-07 ENCOUNTER — OFFICE VISIT (OUTPATIENT)
Dept: ORTHOPEDIC SURGERY | Age: 78
End: 2023-08-07

## 2023-08-07 VITALS — HEIGHT: 64 IN | WEIGHT: 156 LBS | BODY MASS INDEX: 26.63 KG/M2

## 2023-08-07 DIAGNOSIS — M22.42 CHONDROMALACIA PATELLAE OF LEFT KNEE: ICD-10-CM

## 2023-08-07 DIAGNOSIS — M21.42 PES PLANUS OF BOTH FEET: ICD-10-CM

## 2023-08-07 DIAGNOSIS — M19.071 LOCALIZED OSTEOARTHRITIS OF RIGHT ANKLE: ICD-10-CM

## 2023-08-07 DIAGNOSIS — M17.12 PRIMARY OSTEOARTHRITIS OF LEFT KNEE: Primary | ICD-10-CM

## 2023-08-07 DIAGNOSIS — M25.571 ACUTE RIGHT ANKLE PAIN: ICD-10-CM

## 2023-08-07 DIAGNOSIS — M21.41 PES PLANUS OF BOTH FEET: ICD-10-CM

## 2023-08-07 DIAGNOSIS — M25.562 ACUTE PAIN OF LEFT KNEE: ICD-10-CM

## 2023-08-07 PROBLEM — M21.40 FLAT FOOT: Status: ACTIVE | Noted: 2023-08-07

## 2023-08-07 RX ORDER — BUPIVACAINE HYDROCHLORIDE 2.5 MG/ML
1 INJECTION, SOLUTION INFILTRATION; PERINEURAL ONCE
Status: COMPLETED | OUTPATIENT
Start: 2023-08-07 | End: 2023-08-07

## 2023-08-07 RX ORDER — LIDOCAINE HYDROCHLORIDE 10 MG/ML
1 INJECTION, SOLUTION INFILTRATION; PERINEURAL ONCE
Status: COMPLETED | OUTPATIENT
Start: 2023-08-07 | End: 2023-08-07

## 2023-08-07 RX ORDER — BETAMETHASONE SODIUM PHOSPHATE AND BETAMETHASONE ACETATE 3; 3 MG/ML; MG/ML
6 INJECTION, SUSPENSION INTRA-ARTICULAR; INTRALESIONAL; INTRAMUSCULAR; SOFT TISSUE ONCE
Status: COMPLETED | OUTPATIENT
Start: 2023-08-07 | End: 2023-08-07

## 2023-08-07 RX ADMIN — BETAMETHASONE SODIUM PHOSPHATE AND BETAMETHASONE ACETATE 6 MG: 3; 3 INJECTION, SUSPENSION INTRA-ARTICULAR; INTRALESIONAL; INTRAMUSCULAR; SOFT TISSUE at 14:57

## 2023-08-07 RX ADMIN — BUPIVACAINE HYDROCHLORIDE 2.5 MG: 2.5 INJECTION, SOLUTION INFILTRATION; PERINEURAL at 14:58

## 2023-08-07 RX ADMIN — LIDOCAINE HYDROCHLORIDE 1 ML: 10 INJECTION, SOLUTION INFILTRATION; PERINEURAL at 14:58

## 2023-08-07 NOTE — PROGRESS NOTES
Chief Complaint  Ankle Pain (OPSP RIGHT ANKLE)    Follow-up recurrent right ankle pain with history of right ankle osteoarthritis    History of left knee pain with with underlying significant left knee osteoarthritis status post completion of GELSYN-3 15 2021     History of fall 9/5/2021 with MRI documented left wrist capsulitis with spraining and [address]  osteoarthritis    History of Present Illness:  Tim Maciel is a 76 y.o. 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 Ganesh Rowe who is being seen today for evaluation of ongoing pain to her left knee. She does have a history of significant patellofemoral arthropathy and knee osteoarthritis to her contralateral right knee treated with a one-time steroid injection by Dr. Elisabeth Lilly in 2015. She states that a week ago on about 1/4/2020 when she was kneeling down vacuuming some carpeted steps at home when she felt a pop primarily to the anterior portion of her left knee prompting today's visit. She has had a fairly consistent achy 5-6 out of 10 pain with her knee with pain with positional changes distance walking crossing her legs walking on uneven surfaces and going up and down stairs. She has taken some Excedrin ice and Aspercreme but this has not improved her symptoms. Initially she was having some night pain but this has improved. No active locking or catching. Her symptoms have not substantially improved over the last week prompting today's visit and she is being seen today for orthopedic and sports consultation with updated imaging. She is not having any instability or buckling symptoms. Manjula Sosa was last seen in the office on 1/11/2021 and was diagnosed with significant bone-on-bone changes to the medial compartment of her left knee with patellofemoral arthropathy and knee synovitis.   Initially she got a substantial improvement following her steroid injection but overall this is worn out and

## 2023-08-10 NOTE — PROGRESS NOTES
Medicare Annual Wellness Visit    Louise Dudley is here for Medicare AWV    Assessment & Plan   Medicare annual wellness visit, subsequent  Recommendations for Preventive Services Due: see orders and patient instructions/AVS.  Recommended screening schedule for the next 5-10 years is provided to the patient in written form: see Patient Instructions/AVS.     No follow-ups on file. Subjective       Patient's complete Health Risk Assessment and screening values have been reviewed and are found in Flowsheets. The following problems were reviewed today and where indicated follow up appointments were made and/or referrals ordered. Positive Risk Factor Screenings with Interventions:    Fall Risk:  Do you feel unsteady or are you worried about falling? : (!) yes (patient is aware she needs to be careful upon rising and with ambulation.)  2 or more falls in past year?: no  Fall with injury in past year?: no     Interventions:    Patient declines any further evaluation or treatment              Weight and Activity:  Physical Activity: Inactive    Days of Exercise per Week: 0 days    Minutes of Exercise per Session: 0 min     On average, how many days per week do you engage in moderate to strenuous exercise (like a brisk walk)?: 0 days  Have you lost any weight without trying in the past 3 months?: No  There is no height or weight on file to calculate BMI. Inactivity Interventions:  Patient declined any further interventions or treatment                     Objective      Patient-Reported Vitals  No data recorded            Allergies   Allergen Reactions    Amoxicillin Rash    Entex Pse [Pseudoephedrine-Guaifenesin]      gittery       Prior to Visit Medications    Medication Sig Taking?  Authorizing Provider   traZODone (DESYREL) 50 MG tablet TAKE 1 AND 1/2 TABLETS BY MOUTH EVERY NIGHT AT BEDTIME FOR SLEEP Yes Lou Corbett MD   buPROPion (WELLBUTRIN SR) 150 MG extended release tablet TAKE 1 TABLET BY MOUTH EVERY

## 2023-08-10 NOTE — PATIENT INSTRUCTIONS
Personalized Preventive Plan for Hung Watts - 8/3/2023  Medicare offers a range of preventive health benefits. Some of the tests and screenings are paid in full while other may be subject to a deductible, co-insurance, and/or copay. Some of these benefits include a comprehensive review of your medical history including lifestyle, illnesses that may run in your family, and various assessments and screenings as appropriate. After reviewing your medical record and screening and assessments performed today your provider may have ordered immunizations, labs, imaging, and/or referrals for you. A list of these orders (if applicable) as well as your Preventive Care list are included within your After Visit Summary for your review. Other Preventive Recommendations:    A preventive eye exam performed by an eye specialist is recommended every 1-2 years to screen for glaucoma; cataracts, macular degeneration, and other eye disorders. A preventive dental visit is recommended every 6 months. Try to get at least 150 minutes of exercise per week or 10,000 steps per day on a pedometer . Order or download the FREE \"Exercise & Physical Activity: Your Everyday Guide\" from [employer]. Call 5-267.335.2677 or search [employer] online. You need 7097-5154 mg of calcium and 2168-7135 IU of vitamin D per day. It is possible to meet your calcium requirement with diet alone, but a vitamin D supplement is usually necessary to meet this goal.  When exposed to the sun, use a sunscreen that protects against both UVA and UVB radiation with an SPF of 30 or greater. Reapply every 2 to 3 hours or after sweating, drying off with a towel, or swimming. Always wear a seat belt when traveling in a car. Always wear a helmet when riding a bicycle or motorcycle.

## 2023-08-15 ENCOUNTER — HOSPITAL ENCOUNTER (OUTPATIENT)
Dept: WOMENS IMAGING | Age: 78
Discharge: HOME | End: 2023-08-15
Payer: MEDICARE

## 2023-08-15 DIAGNOSIS — Z12.31 SCREENING MAMMOGRAM, ENCOUNTER FOR: ICD-10-CM

## 2023-08-15 PROCEDURE — 77063 BREAST TOMOSYNTHESIS BI: CPT

## 2023-08-30 ENCOUNTER — OFFICE VISIT (OUTPATIENT)
Dept: PULMONOLOGY | Age: 78
End: 2023-08-30
Payer: MEDICARE

## 2023-08-30 VITALS
RESPIRATION RATE: 16 BRPM | WEIGHT: 154 LBS | SYSTOLIC BLOOD PRESSURE: 130 MMHG | TEMPERATURE: 98.3 F | HEIGHT: 64 IN | BODY MASS INDEX: 26.29 KG/M2 | DIASTOLIC BLOOD PRESSURE: 85 MMHG | HEART RATE: 76 BPM | OXYGEN SATURATION: 91 %

## 2023-08-30 DIAGNOSIS — R06.09 DYSPNEA ON EXERTION: ICD-10-CM

## 2023-08-30 DIAGNOSIS — G47.33 OBSTRUCTIVE SLEEP APNEA SYNDROME: Primary | ICD-10-CM

## 2023-08-30 DIAGNOSIS — J98.4 RESTRICTIVE LUNG DISEASE: ICD-10-CM

## 2023-08-30 PROCEDURE — 99214 OFFICE O/P EST MOD 30 MIN: CPT | Performed by: INTERNAL MEDICINE

## 2023-08-30 PROCEDURE — 1123F ACP DISCUSS/DSCN MKR DOCD: CPT | Performed by: INTERNAL MEDICINE

## 2023-08-30 ASSESSMENT — ENCOUNTER SYMPTOMS
SORE THROAT: 0
SWOLLEN GLANDS: 0
RHINORRHEA: 0
VOMITING: 0
SPUTUM PRODUCTION: 0
ABDOMINAL PAIN: 0
HEMOPTYSIS: 0
WHEEZING: 0
SHORTNESS OF BREATH: 1
ORTHOPNEA: 0

## 2023-08-30 NOTE — PATIENT INSTRUCTIONS
ASSESSMENT/PLAN:   Diagnosis Orders   1. Obstructive sleep apnea syndrome        2. Restrictive lung disease        3. Dyspnea on exertion            Shortness of breath/restrictive lung disease/   Echo done 2/22/22    Conclusions      Summary   Baseline resting EKG shows NSR. Poor R wave progression. Dobutamine stress echo is performed. Peak heart rate achieved at 126bpm( 88%   of predicted maximum HR. No cardiac arrhythmia. No symptoms with dobutamine. The patient had a Dobutamine Stress No EKG changes of stress induced left   ventricular ischemia. Stress echocardiogram is performed with dobutamine infusion. Baseline echocardiogram shows normal left ventricular function with an   estimated ejection fraction of 55% . Following the dobutamine infusion there   was augmentation of all segments with no areas of stress induced   hypokinesis. Normal dobutamine stress echocardiogram.       PFT done 5/26/222      DATE OF PROCEDURE:  05/26/2022     PFT INTERPRETATION     The patient is 77-year-old female who underwent a PFT for dyspnea on  exertion. Spirometry shows FVC to be 75%, FEV1 to be 77%, FEV1 to FVC  ratio was 103%, EER79-94% was 83%. The patient did not have any  significant postbronchodilator improvement. Lung volume shows the total  capacity was mildly reduced at 73%. The patient does not have any air  trapping or hyperinflation. The patient's diffusion capacity when  adjusted for volume was normal.  The patient's flow-volume loop was  normal.  On the basis of this PFT, the patient has mild restrictive lung  disease. Please correlate clinically.           Cxr doen 1/20/22     Impression   Bibasilar atelectasis versus infiltrates     Options at this time    Give Albuterol 2 puffs as needed to use when short of breath- ok to still use  Cardiopulmonary excerise testing- will consider later  Pulm rehab    Will send to pulm rehab    Will get back in 3 months and decide at that time    My clinical

## 2023-08-30 NOTE — PROGRESS NOTES
Zechariah Lehman (: 1945 ) is a 76 y.o. female here for an evaluation of   Chief Complaint   Patient presents with    Sleep Apnea     6 month VIRGIL FU         ASSESSMENT/PLAN:   Diagnosis Orders   1. Obstructive sleep apnea syndrome        2. Restrictive lung disease        3. Dyspnea on exertion            Shortness of breath/restrictive lung disease/   Echo done 22    Conclusions      Summary   Baseline resting EKG shows NSR. Poor R wave progression. Dobutamine stress echo is performed. Peak heart rate achieved at 126bpm( 88%   of predicted maximum HR. No cardiac arrhythmia. No symptoms with dobutamine. The patient had a Dobutamine Stress No EKG changes of stress induced left   ventricular ischemia. Stress echocardiogram is performed with dobutamine infusion. Baseline echocardiogram shows normal left ventricular function with an   estimated ejection fraction of 55% . Following the dobutamine infusion there   was augmentation of all segments with no areas of stress induced   hypokinesis. Normal dobutamine stress echocardiogram.       PFT done       DATE OF PROCEDURE:  2022     PFT INTERPRETATION     The patient is 68-year-old female who underwent a PFT for dyspnea on  exertion. Spirometry shows FVC to be 75%, FEV1 to be 77%, FEV1 to FVC  ratio was 103%, BYJ07-64% was 83%. The patient did not have any  significant postbronchodilator improvement. Lung volume shows the total  capacity was mildly reduced at 73%. The patient does not have any air  trapping or hyperinflation. The patient's diffusion capacity when  adjusted for volume was normal.  The patient's flow-volume loop was  normal.  On the basis of this PFT, the patient has mild restrictive lung  disease. Please correlate clinically.           Cxr doen 22     Impression   Bibasilar atelectasis versus infiltrates     Options at this time    Give Albuterol 2 puffs as needed to use when short of breath- ok to still

## 2023-08-30 NOTE — PROGRESS NOTES
MA Communication:   The following orders are received by verbal communication from Trung Moreno MD    Orders include:  Pulmonary Rehab and FU in 3 months

## 2023-09-11 ENCOUNTER — OFFICE VISIT (OUTPATIENT)
Dept: ORTHOPEDIC SURGERY | Age: 78
End: 2023-09-11
Payer: MEDICARE

## 2023-09-11 DIAGNOSIS — M25.562 ACUTE PAIN OF LEFT KNEE: ICD-10-CM

## 2023-09-11 DIAGNOSIS — M22.42 CHONDROMALACIA PATELLAE OF LEFT KNEE: ICD-10-CM

## 2023-09-11 DIAGNOSIS — M17.12 PRIMARY OSTEOARTHRITIS OF LEFT KNEE: Primary | ICD-10-CM

## 2023-09-11 PROCEDURE — 20610 DRAIN/INJ JOINT/BURSA W/O US: CPT | Performed by: FAMILY MEDICINE

## 2023-09-11 PROCEDURE — 1123F ACP DISCUSS/DSCN MKR DOCD: CPT | Performed by: FAMILY MEDICINE

## 2023-09-11 PROCEDURE — 99214 OFFICE O/P EST MOD 30 MIN: CPT | Performed by: FAMILY MEDICINE

## 2023-09-11 RX ORDER — BUPIVACAINE HYDROCHLORIDE 2.5 MG/ML
2 INJECTION, SOLUTION INFILTRATION; PERINEURAL ONCE
Status: COMPLETED | OUTPATIENT
Start: 2023-09-11 | End: 2023-09-11

## 2023-09-11 RX ORDER — BETAMETHASONE SODIUM PHOSPHATE AND BETAMETHASONE ACETATE 3; 3 MG/ML; MG/ML
12 INJECTION, SUSPENSION INTRA-ARTICULAR; INTRALESIONAL; INTRAMUSCULAR; SOFT TISSUE ONCE
Status: COMPLETED | OUTPATIENT
Start: 2023-09-11 | End: 2023-09-11

## 2023-09-11 RX ORDER — LIDOCAINE HYDROCHLORIDE 10 MG/ML
1 INJECTION, SOLUTION INFILTRATION; PERINEURAL ONCE
Status: COMPLETED | OUTPATIENT
Start: 2023-09-11 | End: 2023-09-11

## 2023-09-11 RX ADMIN — LIDOCAINE HYDROCHLORIDE 1 ML: 10 INJECTION, SOLUTION INFILTRATION; PERINEURAL at 14:56

## 2023-09-11 RX ADMIN — BETAMETHASONE SODIUM PHOSPHATE AND BETAMETHASONE ACETATE 12 MG: 3; 3 INJECTION, SUSPENSION INTRA-ARTICULAR; INTRALESIONAL; INTRAMUSCULAR; SOFT TISSUE at 14:55

## 2023-09-11 RX ADMIN — BUPIVACAINE HYDROCHLORIDE 5 MG: 2.5 INJECTION, SOLUTION INFILTRATION; PERINEURAL at 14:55

## 2023-09-11 NOTE — PROGRESS NOTES
Chief Complaint  Shoulder Pain (Ck ankle/knee)    Follow-up recurrent right ankle pain with history of right ankle osteoarthritis    History of left knee pain with with underlying significant left knee osteoarthritis status post completion of GELSYN-3 15 2021     History of fall 9/5/2021 with MRI documented left wrist capsulitis with spraining and [address] osteoarthritis    History of Present Illness:  Jose Washington is a 76 y.o. 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 Shankar Back Day who is being seen today for evaluation of ongoing pain to her left knee. She does have a history of significant patellofemoral arthropathy and knee osteoarthritis to her contralateral right knee treated with a one-time steroid injection by Dr. Salma Salguero in 2015. She states that a week ago on about 1/4/2020 when she was kneeling down vacuuming some carpeted steps at home when she felt a pop primarily to the anterior portion of her left knee prompting today's visit. She has had a fairly consistent achy 5-6 out of 10 pain with her knee with pain with positional changes distance walking crossing her legs walking on uneven surfaces and going up and down stairs. She has taken some Excedrin ice and Aspercreme but this has not improved her symptoms. Initially she was having some night pain but this has improved. No active locking or catching. Her symptoms have not substantially improved over the last week prompting today's visit and she is being seen today for orthopedic and sports consultation with updated imaging. She is not having any instability or buckling symptoms. Yimi Weldon was last seen in the office on 1/11/2021 and was diagnosed with significant bone-on-bone changes to the medial compartment of her left knee with patellofemoral arthropathy and knee synovitis.   Initially she got a substantial improvement following her steroid injection but overall this is worn out and

## 2023-09-14 ENCOUNTER — TELEPHONE (OUTPATIENT)
Dept: ORTHOPEDIC SURGERY | Age: 78
End: 2023-09-14

## 2023-09-14 NOTE — TELEPHONE ENCOUNTER
LVM for patient that gelsyn-3 injections have been approved for bilateral knees. Told patient they could call central scheduling at [phone] at their convenience to schedule those appointments.  Also told them if they had any problems or questions, they could call me directly at [phone]

## 2023-09-18 ENCOUNTER — OFFICE VISIT (OUTPATIENT)
Dept: ORTHOPEDIC SURGERY | Age: 78
End: 2023-09-18

## 2023-09-18 DIAGNOSIS — M17.12 PRIMARY OSTEOARTHRITIS OF LEFT KNEE: Primary | ICD-10-CM

## 2023-09-18 DIAGNOSIS — M22.42 CHONDROMALACIA PATELLAE OF LEFT KNEE: ICD-10-CM

## 2023-09-18 DIAGNOSIS — M25.562 ACUTE PAIN OF LEFT KNEE: ICD-10-CM

## 2023-09-18 NOTE — PROGRESS NOTES
2+.  Neurological: The patient has good coordination. There is no weakness or sensory deficit. Knee Examination  Inspection: No high-grade deformity although she does have a trace knee joint effusion. She does have patellofemoral crepitation. Palpation: She has more mild recurrent tenderness over the medial and lateral patellofemoral facet and does have now more mild pain reproduced with patellar grind testing and diffuse primarily anterior third medial joint line tenderness on the left with less lateral pain. Rang of Motion: She does have only minimal stiffness in the terminal 5 degrees of extension. Flexion to about 110 with patellofemoral crepitation. Hamstrings mildly tight. Strength: 4 out of 5 with flexion extension. Special Tests: He does have much less pain with patellar grind testing and pain with medial Belén's. No high-grade click. Negative lateral Belén's. No instability. Screening hip testing reasonably benign. Skin: There are no rashes, ulcerations or lesions. Distal neurovascular exam is intact. Gait: Improved gait    Reflex symmetrically preserved    Additional Comments: Right ankle evaluation does reveal improved ankle tenderness without evident ankle joint effusion. She does have more mild tenderness to the anterior lateral talar dome with mild tenderness over the ATFL. No high-grade syndesmotic or fibular metaphyseal tenderness. Mild discomfort with palpation of the posterior tibialis tendon with only mild tenderness over the peroneal's. .  No evidence of instability or tendon subluxation. . She demonstrates i restrictions in her ankle motion with some ongoing tightness to her Achilles and improving strength at 4 out of 5 with eversion and dorsiflexion. Effectively negative anterior drawer. Negative talar tilt and Deng's testing. Contralateral ankle exam is benign.     Examination of her right wrist does feel degenerative changes over the first [facility] joint

## 2023-09-25 ENCOUNTER — OFFICE VISIT (OUTPATIENT)
Dept: ORTHOPEDIC SURGERY | Age: 78
End: 2023-09-25
Payer: MEDICARE

## 2023-09-25 DIAGNOSIS — M25.562 ACUTE PAIN OF LEFT KNEE: ICD-10-CM

## 2023-09-25 DIAGNOSIS — M19.071 LOCALIZED OSTEOARTHRITIS OF RIGHT ANKLE: ICD-10-CM

## 2023-09-25 DIAGNOSIS — M17.12 PRIMARY OSTEOARTHRITIS OF LEFT KNEE: Primary | ICD-10-CM

## 2023-09-25 DIAGNOSIS — M25.571 CHRONIC PAIN OF RIGHT ANKLE: ICD-10-CM

## 2023-09-25 DIAGNOSIS — M22.42 CHONDROMALACIA PATELLAE OF LEFT KNEE: ICD-10-CM

## 2023-09-25 DIAGNOSIS — G89.29 CHRONIC PAIN OF RIGHT ANKLE: ICD-10-CM

## 2023-09-25 PROCEDURE — 99213 OFFICE O/P EST LOW 20 MIN: CPT | Performed by: FAMILY MEDICINE

## 2023-09-25 PROCEDURE — 1123F ACP DISCUSS/DSCN MKR DOCD: CPT | Performed by: FAMILY MEDICINE

## 2023-09-25 PROCEDURE — 20610 DRAIN/INJ JOINT/BURSA W/O US: CPT | Performed by: FAMILY MEDICINE

## 2023-09-25 NOTE — PROGRESS NOTES
[facility] #: [id number] #: [id number] Procedure: MR Left Wrist joint w/o Contrast ; Reason for Exam: Sprain of left wrist. Rule out distal radius, scaphoid fracture vs. sprain. This document is confidential medical information. Unauthorized disclosure or use of this information is prohibited by law. If you are not the intended recipient of this document, please advise us by calling immediately [phone]. [facility] [address]           Patient Name: Chava Vazquez   Case ID: [id number]   Patient : 1945   Referring Physician: Yovanny Brambila MD   Exam Date: 2021   Exam Description: MR Left Wrist joint w/o Contrast            HISTORY:  Sprain of left wrist.  Evaluate for distal radius, scaphoid fracture versus sprain. TECHNICAL FACTORS:  Long- and short-axis fat- and water-weighted images were performed. COMPARISON:  None. FINDINGS:  Diffuse swelling circumferentially surrounds the wrist and extends to the hand. Cyst present within the lunate and less so scaphoid, capitate, hamate and triquetrum. Remodeled 1st CMC joint. Volar beak ligament is chronically torn and scarred or stretched and    insufficient. Flexor and extensor tendon sheath inflammation. No tear. Edema changes throughout the carpals. Subtle edema changes within the distal radius. Hyperemic marrow change from underlying capsulosynovitis favored over acute contusion although    a mixed pattern may be present particularly considering history of trauma. Scapholunate ligament and lunotriquetral ligament are intact. TFCC is degenerated. This    primarily involves the inner-third. Pinhole perforation suspected. CONCLUSION:   1. Diffuse capsulosynovitis throughout the radiocarpal and midcarpal joints and less so distal    radioulnar joint.   Traction- or impaction-related cyst and hyperemic marrow change scattered    throughout the carpals and

## 2023-09-26 ENCOUNTER — HOSPITAL ENCOUNTER (OUTPATIENT)
Dept: CARDIAC REHAB | Age: 78
Setting detail: THERAPIES SERIES
Discharge: HOME | End: 2023-09-26

## 2023-09-29 DIAGNOSIS — E78.5 HYPERLIPIDEMIA: ICD-10-CM

## 2023-09-29 RX ORDER — PRAVASTATIN SODIUM 20 MG
TABLET ORAL
Qty: 90 TABLET | Refills: 1 | Status: SHIPPED | OUTPATIENT
Start: 2023-09-29

## 2023-09-29 NOTE — TELEPHONE ENCOUNTER
Additional Comments Patient is out of medication         Refill request for Pravastatin  medication.      Name of [address] visit - 8/3/23     Pending visit - 2/1/24    Last refill -2/3/23

## 2023-10-02 ENCOUNTER — HOSPITAL ENCOUNTER (OUTPATIENT)
Dept: CARDIAC REHAB | Age: 78
Setting detail: THERAPIES SERIES
Discharge: HOME | End: 2023-10-02
Payer: MEDICARE

## 2023-10-02 ENCOUNTER — OFFICE VISIT (OUTPATIENT)
Dept: ORTHOPEDIC SURGERY | Age: 78
End: 2023-10-02

## 2023-10-02 VITALS — WEIGHT: 154 LBS | BODY MASS INDEX: 26.29 KG/M2 | HEIGHT: 64 IN

## 2023-10-02 DIAGNOSIS — G89.29 CHRONIC PAIN OF RIGHT ANKLE: ICD-10-CM

## 2023-10-02 DIAGNOSIS — M19.071 LOCALIZED OSTEOARTHRITIS OF RIGHT ANKLE: ICD-10-CM

## 2023-10-02 DIAGNOSIS — M25.562 ACUTE PAIN OF LEFT KNEE: ICD-10-CM

## 2023-10-02 DIAGNOSIS — M17.12 PRIMARY OSTEOARTHRITIS OF LEFT KNEE: Primary | ICD-10-CM

## 2023-10-02 DIAGNOSIS — M22.42 CHONDROMALACIA PATELLAE OF LEFT KNEE: ICD-10-CM

## 2023-10-02 DIAGNOSIS — M25.571 CHRONIC PAIN OF RIGHT ANKLE: ICD-10-CM

## 2023-10-02 PROCEDURE — G0239 OTH RESP PROC, GROUP: HCPCS

## 2023-10-02 NOTE — PROGRESS NOTES
Chief Complaint  Knee Pain (GELSYN-3 #3 LEFT KNEE/) and Ankle Pain    Follow-up recurrent right ankle pain with history of right ankle osteoarthritis    History of left knee pain with with underlying significant left knee osteoarthritis with continuation of Gelsyn-3 left knee    History of fall 9/5/2021 with MRI documented left wrist capsulitis with spraining and first [facility] osteoarthritis    History of Present Illness:  Kirk Meeks is a 78 y.o. 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 Sugar Rowe who is being seen today for evaluation of ongoing pain to her left knee. She does have a history of significant patellofemoral arthropathy and knee osteoarthritis to her contralateral right knee treated with a one-time steroid injection by Dr. Abdirahman Knowles in 2015. She states that a week ago on about 1/4/2020 when she was kneeling down vacuuming some carpeted steps at home when she felt a pop primarily to the anterior portion of her left knee prompting today's visit. She has had a fairly consistent achy 5-6 out of 10 pain with her knee with pain with positional changes distance walking crossing her legs walking on uneven surfaces and going up and down stairs. She has taken some Excedrin ice and Aspercreme but this has not improved her symptoms. Initially she was having some night pain but this has improved. No active locking or catching. Her symptoms have not substantially improved over the last week prompting today's visit and she is being seen today for orthopedic and sports consultation with updated imaging. She is not having any instability or buckling symptoms. Roma Portillo was last seen in the office on 1/11/2021 and was diagnosed with significant bone-on-bone changes to the medial compartment of her left knee with patellofemoral arthropathy and knee synovitis.   Initially she got a substantial improvement following her steroid injection but overall this

## 2023-10-04 ENCOUNTER — HOSPITAL ENCOUNTER (OUTPATIENT)
Dept: CARDIAC REHAB | Age: 78
Setting detail: THERAPIES SERIES
Discharge: HOME | End: 2023-10-04
Payer: MEDICARE

## 2023-10-04 PROCEDURE — G0239 OTH RESP PROC, GROUP: HCPCS

## 2023-10-06 ENCOUNTER — HOSPITAL ENCOUNTER (OUTPATIENT)
Dept: CARDIAC REHAB | Age: 78
Setting detail: THERAPIES SERIES
Discharge: HOME | End: 2023-10-06
Payer: MEDICARE

## 2023-10-06 PROCEDURE — G0239 OTH RESP PROC, GROUP: HCPCS

## 2023-10-09 ENCOUNTER — HOSPITAL ENCOUNTER (OUTPATIENT)
Dept: CARDIAC REHAB | Age: 78
Setting detail: THERAPIES SERIES
Discharge: HOME | End: 2023-10-09
Payer: MEDICARE

## 2023-10-09 PROCEDURE — G0239 OTH RESP PROC, GROUP: HCPCS

## 2023-10-11 ENCOUNTER — HOSPITAL ENCOUNTER (OUTPATIENT)
Dept: CARDIAC REHAB | Age: 78
Setting detail: THERAPIES SERIES
Discharge: HOME | End: 2023-10-11
Payer: MEDICARE

## 2023-10-11 PROCEDURE — G0239 OTH RESP PROC, GROUP: HCPCS

## 2023-10-13 ENCOUNTER — HOSPITAL ENCOUNTER (OUTPATIENT)
Dept: CARDIAC REHAB | Age: 78
Setting detail: THERAPIES SERIES
Discharge: HOME | End: 2023-10-13
Payer: MEDICARE

## 2023-10-13 PROCEDURE — G0239 OTH RESP PROC, GROUP: HCPCS

## 2023-10-16 ENCOUNTER — HOSPITAL ENCOUNTER (OUTPATIENT)
Dept: CARDIAC REHAB | Age: 78
Setting detail: THERAPIES SERIES
Discharge: HOME | End: 2023-10-16
Payer: MEDICARE

## 2023-10-16 PROCEDURE — G0239 OTH RESP PROC, GROUP: HCPCS

## 2023-10-18 ENCOUNTER — HOSPITAL ENCOUNTER (OUTPATIENT)
Dept: CARDIAC REHAB | Age: 78
Setting detail: THERAPIES SERIES
Discharge: HOME | End: 2023-10-18
Payer: MEDICARE

## 2023-10-18 PROCEDURE — G0239 OTH RESP PROC, GROUP: HCPCS

## 2023-10-20 ENCOUNTER — HOSPITAL ENCOUNTER (OUTPATIENT)
Dept: CARDIAC REHAB | Age: 78
Setting detail: THERAPIES SERIES
Discharge: HOME | End: 2023-10-20
Payer: MEDICARE

## 2023-10-20 PROCEDURE — G0239 OTH RESP PROC, GROUP: HCPCS

## 2023-10-23 ENCOUNTER — HOSPITAL ENCOUNTER (OUTPATIENT)
Dept: CARDIAC REHAB | Age: 78
Setting detail: THERAPIES SERIES
Discharge: HOME | End: 2023-10-23
Payer: MEDICARE

## 2023-10-23 PROCEDURE — G0239 OTH RESP PROC, GROUP: HCPCS

## 2023-10-25 ENCOUNTER — HOSPITAL ENCOUNTER (OUTPATIENT)
Dept: CARDIAC REHAB | Age: 78
Setting detail: THERAPIES SERIES
Discharge: HOME | End: 2023-10-25
Payer: MEDICARE

## 2023-10-25 PROCEDURE — G0239 OTH RESP PROC, GROUP: HCPCS

## 2023-10-27 ENCOUNTER — HOSPITAL ENCOUNTER (OUTPATIENT)
Dept: CARDIAC REHAB | Age: 78
Setting detail: THERAPIES SERIES
Discharge: HOME | End: 2023-10-27
Payer: MEDICARE

## 2023-10-27 PROCEDURE — G0239 OTH RESP PROC, GROUP: HCPCS

## 2023-10-30 ENCOUNTER — HOSPITAL ENCOUNTER (OUTPATIENT)
Dept: CARDIAC REHAB | Age: 78
Setting detail: THERAPIES SERIES
Discharge: HOME | End: 2023-10-30
Payer: MEDICARE

## 2023-10-30 PROCEDURE — G0239 OTH RESP PROC, GROUP: HCPCS

## 2023-11-01 ENCOUNTER — HOSPITAL ENCOUNTER (OUTPATIENT)
Dept: CARDIAC REHAB | Age: 78
Setting detail: THERAPIES SERIES
Discharge: HOME | End: 2023-11-01
Payer: MEDICARE

## 2023-11-01 PROCEDURE — G0239 OTH RESP PROC, GROUP: HCPCS

## 2023-11-03 ENCOUNTER — HOSPITAL ENCOUNTER (OUTPATIENT)
Dept: CARDIAC REHAB | Age: 78
Setting detail: THERAPIES SERIES
Discharge: HOME | End: 2023-11-03
Payer: MEDICARE

## 2023-11-03 PROCEDURE — G0239 OTH RESP PROC, GROUP: HCPCS

## 2023-11-06 ENCOUNTER — APPOINTMENT (OUTPATIENT)
Dept: CARDIAC REHAB | Age: 78
End: 2023-11-06
Payer: MEDICARE

## 2023-11-08 ENCOUNTER — HOSPITAL ENCOUNTER (OUTPATIENT)
Dept: CARDIAC REHAB | Age: 78
Setting detail: THERAPIES SERIES
Discharge: HOME | End: 2023-11-08
Payer: MEDICARE

## 2023-11-08 PROCEDURE — G0239 OTH RESP PROC, GROUP: HCPCS

## 2023-11-10 ENCOUNTER — HOSPITAL ENCOUNTER (OUTPATIENT)
Dept: CARDIAC REHAB | Age: 78
Setting detail: THERAPIES SERIES
Discharge: HOME | End: 2023-11-10
Payer: MEDICARE

## 2023-11-10 PROCEDURE — G0239 OTH RESP PROC, GROUP: HCPCS

## 2023-11-13 ENCOUNTER — HOSPITAL ENCOUNTER (OUTPATIENT)
Dept: CARDIAC REHAB | Age: 78
Setting detail: THERAPIES SERIES
Discharge: HOME | End: 2023-11-13
Payer: MEDICARE

## 2023-11-13 PROCEDURE — G0239 OTH RESP PROC, GROUP: HCPCS

## 2023-11-15 ENCOUNTER — HOSPITAL ENCOUNTER (OUTPATIENT)
Dept: CARDIAC REHAB | Age: 78
Setting detail: THERAPIES SERIES
Discharge: HOME | End: 2023-11-15
Payer: MEDICARE

## 2023-11-15 PROCEDURE — G0239 OTH RESP PROC, GROUP: HCPCS

## 2023-11-20 ENCOUNTER — HOSPITAL ENCOUNTER (OUTPATIENT)
Dept: CARDIAC REHAB | Age: 78
Setting detail: THERAPIES SERIES
Discharge: HOME | End: 2023-11-20
Payer: MEDICARE

## 2023-11-20 PROCEDURE — G0239 OTH RESP PROC, GROUP: HCPCS

## 2023-11-22 ENCOUNTER — OFFICE VISIT (OUTPATIENT)
Dept: ORTHOPEDIC SURGERY | Age: 78
End: 2023-11-22

## 2023-11-22 ENCOUNTER — HOSPITAL ENCOUNTER (OUTPATIENT)
Dept: CARDIAC REHAB | Age: 78
Setting detail: THERAPIES SERIES
Discharge: HOME | End: 2023-11-22
Payer: MEDICARE

## 2023-11-22 VITALS — BODY MASS INDEX: 26.29 KG/M2 | HEIGHT: 64 IN | WEIGHT: 154 LBS

## 2023-11-22 DIAGNOSIS — M17.11 PRIMARY OSTEOARTHRITIS OF RIGHT KNEE: ICD-10-CM

## 2023-11-22 DIAGNOSIS — M25.561 ACUTE PAIN OF RIGHT KNEE: ICD-10-CM

## 2023-11-22 DIAGNOSIS — M17.12 PRIMARY OSTEOARTHRITIS OF LEFT KNEE: Primary | ICD-10-CM

## 2023-11-22 DIAGNOSIS — M22.41 CHONDROMALACIA PATELLAE OF RIGHT KNEE: ICD-10-CM

## 2023-11-22 DIAGNOSIS — M22.42 CHONDROMALACIA PATELLAE OF LEFT KNEE: ICD-10-CM

## 2023-11-22 DIAGNOSIS — M25.562 ACUTE PAIN OF LEFT KNEE: ICD-10-CM

## 2023-11-22 PROCEDURE — G0239 OTH RESP PROC, GROUP: HCPCS

## 2023-11-22 RX ORDER — BETAMETHASONE SODIUM PHOSPHATE AND BETAMETHASONE ACETATE 3; 3 MG/ML; MG/ML
12 INJECTION, SUSPENSION INTRA-ARTICULAR; INTRALESIONAL; INTRAMUSCULAR; SOFT TISSUE ONCE
Status: COMPLETED | OUTPATIENT
Start: 2023-11-22 | End: 2023-11-22

## 2023-11-22 RX ORDER — LIDOCAINE HYDROCHLORIDE 10 MG/ML
1 INJECTION, SOLUTION INFILTRATION; PERINEURAL ONCE
Status: COMPLETED | OUTPATIENT
Start: 2023-11-22 | End: 2023-11-22

## 2023-11-22 RX ORDER — BUPIVACAINE HYDROCHLORIDE 2.5 MG/ML
2 INJECTION, SOLUTION INFILTRATION; PERINEURAL ONCE
Status: COMPLETED | OUTPATIENT
Start: 2023-11-22 | End: 2023-11-22

## 2023-11-22 RX ADMIN — BUPIVACAINE HYDROCHLORIDE 5 MG: 2.5 INJECTION, SOLUTION INFILTRATION; PERINEURAL at 15:20

## 2023-11-22 RX ADMIN — BETAMETHASONE SODIUM PHOSPHATE AND BETAMETHASONE ACETATE 12 MG: 3; 3 INJECTION, SUSPENSION INTRA-ARTICULAR; INTRALESIONAL; INTRAMUSCULAR; SOFT TISSUE at 15:20

## 2023-11-22 RX ADMIN — LIDOCAINE HYDROCHLORIDE 1 ML: 10 INJECTION, SOLUTION INFILTRATION; PERINEURAL at 15:20

## 2023-11-22 NOTE — PROGRESS NOTES
cyst and hyperemic marrow change scattered    throughout the carpals and less so distal radius. Marrow changes in part on a degenerative    basis and likely in part from hyperemic change due to preexisting synovitis. Superimposed    acute contusion may be contributory. No fracture, AVN or mass. 2. Degeneration inner-third TFC. Pinhole perforation suspected. 3. No scapholunate ligament or lunotriquetral ligament tear. 4. Severe arthrosis and remodeling 1st CMC joint. Volar beak ligament is chronically torn and    scarred or stretched and insufficient. 5. Flexor and extensor tendon sheath inflammation. No kristi tear or kristi tenosynovitis. Thank you for the opportunity to provide your interpretation. Yessica Avila MD       A: ELISHA/audra 09/13/2021 2:23 PM   T: AUDRA 09/13/2021 12:58 PM             Right knee AP and PA weightbearing sunrise and lateral films are obtained today and does show advanced near bone-on-bone changes to the medial compartment of her right knee with fairly advanced patellofemoral arthropathy. Assessment : #1. Symptomatically improved left knee significant medial compartment osteoarthritis with bone-on-bone patellofemoral arthropathy status post completion of left knee GELSYN-3 injection 1/2/2023      #2.  2 to 3-month status post recurrent symptomatic aggravation advanced right knee osteoarthritis with near bone-on-bone changes right knee medial compartment osteoarthritis with knee pain and synovitis. #3.  5 week status post improved aggravation right ankle significant osteoarthritis with recurrent ankle synovitis with history of posttraumatic peroneal tendinitis and pes planus  #4. History of left wrist capsule synovitis with spraining and aggravation of underlying intercarpal/first CMC osteoarthritis with tenosynovitis and spraining (MRI negative for occult fracture )    Impression:  Encounter Diagnoses   Name Primary?

## 2023-11-24 ENCOUNTER — APPOINTMENT (OUTPATIENT)
Dept: CARDIAC REHAB | Age: 78
End: 2023-11-24
Payer: MEDICARE

## 2023-11-27 ENCOUNTER — HOSPITAL ENCOUNTER (OUTPATIENT)
Dept: CARDIAC REHAB | Age: 78
Setting detail: THERAPIES SERIES
Discharge: HOME | End: 2023-11-27
Payer: MEDICARE

## 2023-11-27 PROCEDURE — G0239 OTH RESP PROC, GROUP: HCPCS

## 2023-11-29 ENCOUNTER — HOSPITAL ENCOUNTER (OUTPATIENT)
Dept: CARDIAC REHAB | Age: 78
Setting detail: THERAPIES SERIES
Discharge: HOME | End: 2023-11-29
Payer: MEDICARE

## 2023-11-29 ENCOUNTER — TELEPHONE (OUTPATIENT)
Dept: ORTHOPEDIC SURGERY | Age: 78
End: 2023-11-29

## 2023-11-29 PROCEDURE — G0239 OTH RESP PROC, GROUP: HCPCS

## 2023-11-29 NOTE — TELEPHONE ENCOUNTER
Spoke to patient and let them know that GELSYN-3 injections have been approved for their RIGHT knee. Scheduled patient for those injections.

## 2023-12-01 ENCOUNTER — HOSPITAL ENCOUNTER (OUTPATIENT)
Dept: CARDIAC REHAB | Age: 78
Setting detail: THERAPIES SERIES
Discharge: HOME | End: 2023-12-01
Payer: MEDICARE

## 2023-12-01 PROCEDURE — 94625 PHY/QHP OP PULM RHB W/O MNTR: CPT

## 2023-12-01 PROCEDURE — G0239 OTH RESP PROC, GROUP: HCPCS

## 2023-12-04 ENCOUNTER — HOSPITAL ENCOUNTER (OUTPATIENT)
Dept: CARDIAC REHAB | Age: 78
Setting detail: THERAPIES SERIES
Discharge: HOME | End: 2023-12-04
Payer: MEDICARE

## 2023-12-04 PROCEDURE — G0239 OTH RESP PROC, GROUP: HCPCS

## 2023-12-06 ENCOUNTER — HOSPITAL ENCOUNTER (OUTPATIENT)
Dept: CARDIAC REHAB | Age: 78
Setting detail: THERAPIES SERIES
Discharge: HOME | End: 2023-12-06
Payer: MEDICARE

## 2023-12-06 ENCOUNTER — OFFICE VISIT (OUTPATIENT)
Dept: ORTHOPEDIC SURGERY | Age: 78
End: 2023-12-06

## 2023-12-06 VITALS — HEIGHT: 64 IN | WEIGHT: 154.1 LBS | BODY MASS INDEX: 26.31 KG/M2

## 2023-12-06 DIAGNOSIS — M22.42 CHONDROMALACIA PATELLAE OF LEFT KNEE: ICD-10-CM

## 2023-12-06 DIAGNOSIS — M25.561 ACUTE PAIN OF RIGHT KNEE: ICD-10-CM

## 2023-12-06 DIAGNOSIS — M22.41 CHONDROMALACIA PATELLAE OF RIGHT KNEE: ICD-10-CM

## 2023-12-06 DIAGNOSIS — M17.11 PRIMARY OSTEOARTHRITIS OF RIGHT KNEE: Primary | ICD-10-CM

## 2023-12-06 DIAGNOSIS — M17.12 PRIMARY OSTEOARTHRITIS OF LEFT KNEE: ICD-10-CM

## 2023-12-06 DIAGNOSIS — M25.562 ACUTE PAIN OF LEFT KNEE: ICD-10-CM

## 2023-12-06 PROCEDURE — G0239 OTH RESP PROC, GROUP: HCPCS

## 2023-12-06 RX ORDER — BUPIVACAINE HYDROCHLORIDE 2.5 MG/ML
2 INJECTION, SOLUTION INFILTRATION; PERINEURAL ONCE
Status: COMPLETED | OUTPATIENT
Start: 2023-12-06 | End: 2023-12-06

## 2023-12-06 RX ORDER — BETAMETHASONE SODIUM PHOSPHATE AND BETAMETHASONE ACETATE 3; 3 MG/ML; MG/ML
12 INJECTION, SUSPENSION INTRA-ARTICULAR; INTRALESIONAL; INTRAMUSCULAR; SOFT TISSUE ONCE
Status: COMPLETED | OUTPATIENT
Start: 2023-12-06 | End: 2023-12-06

## 2023-12-06 RX ADMIN — BUPIVACAINE HYDROCHLORIDE 5 MG: 2.5 INJECTION, SOLUTION INFILTRATION; PERINEURAL at 14:45

## 2023-12-06 RX ADMIN — Medication 1 ML: at 14:46

## 2023-12-06 RX ADMIN — BETAMETHASONE SODIUM PHOSPHATE AND BETAMETHASONE ACETATE 12 MG: 3; 3 INJECTION, SUSPENSION INTRA-ARTICULAR; INTRALESIONAL; INTRAMUSCULAR; SOFT TISSUE at 14:45

## 2023-12-06 NOTE — PROGRESS NOTES
have fairly advanced medial compartment osteoarthritis and completed viscosupplementation with Gelsyn-3 on 10/2/2023 to her left knee. She was seen a couple weeks ago for worsening of her right knee symptoms which have improved also does have degeneration on that side. She is hoping to put off knee arthroplasty as long as she can. After discussion of pros and cons of viscosupplementation, she did receive her first injection of Gelsyn-3 to her right knee. This was performed using the standard prefilled 2 cc syringe. With her slight worsening of pain due to gait alteration, we did perform a steroid injection to her left knee using 2 cc of Celestone, 2 cc Marcaine, 1 cc Xylocaine. She may utilize her wraparound knee brace and is familiar with her patella protection program.  She will continue the Voltaren gel 4 g 4 times daily. I will see her back each of the next 2 weeks to finish up her right knee Gelsyn injection series and we will hope to try to get her on the same schedule and that she wishes to consider knee arthroplasty on the left she did be an ongoing issue. She will contact with questions or concerns         This dictation was performed with a verbal recognition program (DRAGON) and it was checked for errors. It is possible that there are still dictated errors within this office note. If so, please bring any errors to my attention for an addendum. All efforts were made to ensure that this office note is accurate.

## 2023-12-08 ENCOUNTER — HOSPITAL ENCOUNTER (OUTPATIENT)
Dept: CARDIAC REHAB | Age: 78
Setting detail: THERAPIES SERIES
Discharge: HOME | End: 2023-12-08
Payer: MEDICARE

## 2023-12-08 PROCEDURE — G0239 OTH RESP PROC, GROUP: HCPCS

## 2023-12-13 ENCOUNTER — HOSPITAL ENCOUNTER (OUTPATIENT)
Dept: CARDIAC REHAB | Age: 78
Setting detail: THERAPIES SERIES
Discharge: HOME | End: 2023-12-13
Payer: MEDICARE

## 2023-12-13 ENCOUNTER — OFFICE VISIT (OUTPATIENT)
Dept: ORTHOPEDIC SURGERY | Age: 78
End: 2023-12-13

## 2023-12-13 DIAGNOSIS — M17.11 PRIMARY OSTEOARTHRITIS OF RIGHT KNEE: Primary | ICD-10-CM

## 2023-12-13 DIAGNOSIS — M25.561 ACUTE PAIN OF RIGHT KNEE: ICD-10-CM

## 2023-12-13 DIAGNOSIS — M22.41 CHONDROMALACIA PATELLAE OF RIGHT KNEE: ICD-10-CM

## 2023-12-13 PROCEDURE — G0239 OTH RESP PROC, GROUP: HCPCS

## 2023-12-13 NOTE — PROGRESS NOTES
Alcohol and the prefilled 2cc injection of Gelsyn-3 was injected intra-articularly into the right knee via a superolateral approach without difficulty. The patient tolerated this well without difficulty. A band-aid was applied. POST-PROCEDURE INSTRUCTIONS GIVEN TO PATIENT: The patient was advised to ice the knee for 15-20 minutes to relieve any injection site related pain. FOLLOW-UP: as directed. She will continue her Voltaren gel 4 g 4 times daily to her knees bilaterally the importance of keeping up with her home-based exercise program bilaterally was discussed. Once again she had Gelsyn-3 completed on the left on 10/2/2023 we will try to match up her knees with viscosupplementation with her next series. Icing and activity modification and use of her bracing was discussed. She will contact us in the interim with questions or concerns.

## 2023-12-15 ENCOUNTER — HOSPITAL ENCOUNTER (OUTPATIENT)
Dept: CARDIAC REHAB | Age: 78
Setting detail: THERAPIES SERIES
Discharge: HOME | End: 2023-12-15
Payer: MEDICARE

## 2023-12-15 PROCEDURE — G0239 OTH RESP PROC, GROUP: HCPCS

## 2023-12-27 ENCOUNTER — HOSPITAL ENCOUNTER (OUTPATIENT)
Dept: CARDIAC REHAB | Age: 78
Setting detail: THERAPIES SERIES
Discharge: HOME | End: 2023-12-27
Payer: MEDICARE

## 2023-12-27 PROCEDURE — G0239 OTH RESP PROC, GROUP: HCPCS

## 2023-12-29 ENCOUNTER — HOSPITAL ENCOUNTER (OUTPATIENT)
Dept: CARDIAC REHAB | Age: 78
Setting detail: THERAPIES SERIES
Discharge: HOME | End: 2023-12-29
Payer: MEDICARE

## 2023-12-29 PROCEDURE — G0239 OTH RESP PROC, GROUP: HCPCS

## 2024-01-03 ENCOUNTER — HOSPITAL ENCOUNTER (OUTPATIENT)
Dept: CARDIAC REHAB | Age: 79
Setting detail: THERAPIES SERIES
Discharge: HOME | End: 2024-01-03
Payer: MEDICARE

## 2024-01-03 PROCEDURE — G0239 OTH RESP PROC, GROUP: HCPCS

## 2024-01-05 ENCOUNTER — HOSPITAL ENCOUNTER (OUTPATIENT)
Dept: CARDIAC REHAB | Age: 79
Setting detail: THERAPIES SERIES
Discharge: HOME | End: 2024-01-05
Payer: MEDICARE

## 2024-01-05 ENCOUNTER — OFFICE VISIT (OUTPATIENT)
Dept: PULMONOLOGY | Age: 79
End: 2024-01-05
Payer: MEDICARE

## 2024-01-05 VITALS
TEMPERATURE: 98.1 F | HEIGHT: 64 IN | WEIGHT: 155 LBS | SYSTOLIC BLOOD PRESSURE: 118 MMHG | OXYGEN SATURATION: 97 % | HEART RATE: 99 BPM | BODY MASS INDEX: 26.46 KG/M2 | RESPIRATION RATE: 18 BRPM | DIASTOLIC BLOOD PRESSURE: 73 MMHG

## 2024-01-05 DIAGNOSIS — J47.9 BRONCHIECTASIS WITHOUT COMPLICATION (HCC): ICD-10-CM

## 2024-01-05 DIAGNOSIS — G47.33 OBSTRUCTIVE SLEEP APNEA SYNDROME: Primary | ICD-10-CM

## 2024-01-05 DIAGNOSIS — R06.09 DYSPNEA ON EXERTION: ICD-10-CM

## 2024-01-05 DIAGNOSIS — J98.4 RESTRICTIVE LUNG DISEASE: ICD-10-CM

## 2024-01-05 PROCEDURE — 1123F ACP DISCUSS/DSCN MKR DOCD: CPT | Performed by: INTERNAL MEDICINE

## 2024-01-05 PROCEDURE — G0239 OTH RESP PROC, GROUP: HCPCS

## 2024-01-05 PROCEDURE — 99214 OFFICE O/P EST MOD 30 MIN: CPT | Performed by: INTERNAL MEDICINE

## 2024-01-05 RX ORDER — M-VIT,TX,IRON,MINS/CALC/FOLIC 27MG-0.4MG
1 TABLET ORAL DAILY
COMMUNITY

## 2024-01-05 ASSESSMENT — ENCOUNTER SYMPTOMS
SPUTUM PRODUCTION: 0
HEMOPTYSIS: 0
SWOLLEN GLANDS: 0
ABDOMINAL PAIN: 0
VOMITING: 0
WHEEZING: 0
SORE THROAT: 0
SHORTNESS OF BREATH: 1
RHINORRHEA: 0
ORTHOPNEA: 0

## 2024-01-05 NOTE — PATIENT INSTRUCTIONS
ASSESSMENT/PLAN:   Diagnosis Orders   1. Obstructive sleep apnea syndrome        2. Bronchiectasis without complication (HCC)        3. Restrictive lung disease        4. Dyspnea on exertion            Shortness of breath/restrictive lung disease/bronchiectasis   Echo done 2/22/22    Conclusions      Summary   Baseline resting EKG shows NSR. Poor R wave progression.   Dobutamine stress echo is performed. Peak heart rate achieved at 126bpm( 88%   of predicted maximum HR.   No cardiac arrhythmia.   No symptoms with dobutamine.   The patient had a Dobutamine Stress No EKG changes of stress induced left   ventricular ischemia.   Stress echocardiogram is performed with dobutamine infusion.   Baseline echocardiogram shows normal left ventricular function with an   estimated ejection fraction of 55% . Following the dobutamine infusion there   was augmentation of all segments with no areas of stress induced   hypokinesis.   Normal dobutamine stress echocardiogram.       PFT done 5/26/222      DATE OF PROCEDURE:  05/26/2022     PFT INTERPRETATION     The patient is 76-year-old female who underwent a PFT for dyspnea on  exertion.  Spirometry shows FVC to be 75%, FEV1 to be 77%, FEV1 to FVC  ratio was 103%, XJJ03-16% was 83%.  The patient did not have any  significant postbronchodilator improvement.  Lung volume shows the total  capacity was mildly reduced at 73%.  The patient does not have any air  trapping or hyperinflation.  The patient's diffusion capacity when  adjusted for volume was normal.  The patient's flow-volume loop was  normal.  On the basis of this PFT, the patient has mild restrictive lung  disease.  Please correlate clinically.          Cxr doen 1/20/22     Impression   Bibasilar atelectasis versus infiltrates     Options at this time    Give Albuterol 2 puffs as needed to use when short of breath- ok to still use  Cardiopulmonary excerise testing- will consider later  Pulm rehab    Sent to pulm rehab    My

## 2024-01-05 NOTE — PROGRESS NOTES
MA Communication:  The following orders are received by verbal communication from Asia Cheng MD    Orders include:    6 month follow up scheduled    
reactive to light.   Cardiovascular:      Rate and Rhythm: Normal rate and regular rhythm.      Pulses: Normal pulses.      Heart sounds: Normal heart sounds. No murmur heard.     No friction rub.   Pulmonary:      Effort: No respiratory distress.      Breath sounds: No stridor. No wheezing, rhonchi or rales.   Chest:      Chest wall: No tenderness.   Abdominal:      General: Abdomen is flat. Bowel sounds are normal. There is no distension.      Tenderness: There is no abdominal tenderness. There is no guarding.   Musculoskeletal:         General: No swelling or tenderness. Normal range of motion.      Cervical back: Normal range of motion and neck supple. No rigidity.   Skin:     General: Skin is warm and dry.      Coloration: Skin is not jaundiced.   Neurological:      General: No focal deficit present.      Mental Status: She is alert and oriented to person, place, and time. Mental status is at baseline.      Cranial Nerves: No cranial nerve deficit.      Sensory: No sensory deficit.      Motor: No weakness.      Gait: Gait normal.   Psychiatric:         Mood and Affect: Mood normal.         Thought Content: Thought content normal.         Judgment: Judgment normal.              NANCY CHAVEZ MD

## 2024-01-08 ENCOUNTER — HOSPITAL ENCOUNTER (OUTPATIENT)
Dept: CARDIAC REHAB | Age: 79
Setting detail: THERAPIES SERIES
Discharge: HOME | End: 2024-01-08
Payer: MEDICARE

## 2024-01-09 DIAGNOSIS — E78.5 HYPERLIPIDEMIA: ICD-10-CM

## 2024-01-09 RX ORDER — BUPROPION HYDROCHLORIDE 150 MG/1
TABLET, EXTENDED RELEASE ORAL
Qty: 90 TABLET | Refills: 1 | Status: SHIPPED | OUTPATIENT
Start: 2024-01-09

## 2024-01-09 RX ORDER — PRAVASTATIN SODIUM 20 MG
TABLET ORAL
Qty: 90 TABLET | Refills: 1 | Status: SHIPPED | OUTPATIENT
Start: 2024-01-09

## 2024-01-09 NOTE — TELEPHONE ENCOUNTER
Refill request for WELLBUTRIN, PRAVASTATIN medication.     Name of Pharmacy-       Last visit - 7/27/23     Pending visit - 2/1/24    Last refill -7/14/23, 9/29/23      Medication Contract signed -   Last Oarrs ran-         Additional Comments

## 2024-01-10 ENCOUNTER — APPOINTMENT (OUTPATIENT)
Dept: CARDIAC REHAB | Age: 79
End: 2024-01-10
Payer: MEDICARE

## 2024-01-12 ENCOUNTER — APPOINTMENT (OUTPATIENT)
Dept: CARDIAC REHAB | Age: 79
End: 2024-01-12
Payer: MEDICARE

## 2024-01-15 ENCOUNTER — APPOINTMENT (OUTPATIENT)
Dept: CARDIAC REHAB | Age: 79
End: 2024-01-15
Payer: MEDICARE

## 2024-01-17 ENCOUNTER — APPOINTMENT (OUTPATIENT)
Dept: CARDIAC REHAB | Age: 79
End: 2024-01-17
Payer: MEDICARE

## 2024-01-19 ENCOUNTER — APPOINTMENT (OUTPATIENT)
Dept: CARDIAC REHAB | Age: 79
End: 2024-01-19
Payer: MEDICARE

## 2024-01-22 ENCOUNTER — APPOINTMENT (OUTPATIENT)
Dept: CARDIAC REHAB | Age: 79
End: 2024-01-22
Payer: MEDICARE

## 2024-01-24 ENCOUNTER — APPOINTMENT (OUTPATIENT)
Dept: CARDIAC REHAB | Age: 79
End: 2024-01-24
Payer: MEDICARE

## 2024-01-31 ENCOUNTER — HOSPITAL ENCOUNTER (OUTPATIENT)
Age: 79
Discharge: HOME | End: 2024-01-31
Payer: MEDICARE

## 2024-01-31 DIAGNOSIS — N18.31 STAGE 3A CHRONIC KIDNEY DISEASE (HCC): ICD-10-CM

## 2024-01-31 LAB
ANION GAP SERPL CALCULATED.3IONS-SCNC: 7 MMOL/L (ref 3–16)
BUN SERPL-MCNC: 15 MG/DL (ref 7–20)
CALCIUM SERPL-MCNC: 9 MG/DL (ref 8.3–10.6)
CHLORIDE SERPL-SCNC: 102 MMOL/L (ref 99–110)
CO2 SERPL-SCNC: 29 MMOL/L (ref 21–32)
CREAT SERPL-MCNC: 1 MG/DL (ref 0.6–1.2)
GFR SERPLBLD CREATININE-BSD FMLA CKD-EPI: 57 ML/MIN/{1.73_M2}
GLUCOSE SERPL-MCNC: 79 MG/DL (ref 70–99)
POTASSIUM SERPL-SCNC: 3.9 MMOL/L (ref 3.5–5.1)
SODIUM SERPL-SCNC: 138 MMOL/L (ref 136–145)

## 2024-01-31 PROCEDURE — 80048 BASIC METABOLIC PNL TOTAL CA: CPT

## 2024-01-31 PROCEDURE — 36415 COLL VENOUS BLD VENIPUNCTURE: CPT

## 2024-02-01 ENCOUNTER — OFFICE VISIT (OUTPATIENT)
Dept: INTERNAL MEDICINE CLINIC | Age: 79
End: 2024-02-01
Payer: MEDICARE

## 2024-02-01 VITALS
DIASTOLIC BLOOD PRESSURE: 64 MMHG | HEART RATE: 78 BPM | WEIGHT: 153 LBS | SYSTOLIC BLOOD PRESSURE: 106 MMHG | OXYGEN SATURATION: 96 % | TEMPERATURE: 97.4 F | BODY MASS INDEX: 26.26 KG/M2

## 2024-02-01 DIAGNOSIS — F32.5 MAJOR DEPRESSIVE DISORDER IN FULL REMISSION, UNSPECIFIED WHETHER RECURRENT (HCC): Primary | ICD-10-CM

## 2024-02-01 DIAGNOSIS — J47.9 BRONCHIECTASIS WITHOUT COMPLICATION (HCC): ICD-10-CM

## 2024-02-01 DIAGNOSIS — M17.11 PRIMARY OSTEOARTHRITIS OF RIGHT KNEE: ICD-10-CM

## 2024-02-01 DIAGNOSIS — G47.33 OSA (OBSTRUCTIVE SLEEP APNEA): ICD-10-CM

## 2024-02-01 DIAGNOSIS — K58.9 IRRITABLE BOWEL SYNDROME WITHOUT DIARRHEA: ICD-10-CM

## 2024-02-01 DIAGNOSIS — N18.31 STAGE 3A CHRONIC KIDNEY DISEASE (HCC): ICD-10-CM

## 2024-02-01 DIAGNOSIS — E78.2 MIXED HYPERLIPIDEMIA: ICD-10-CM

## 2024-02-01 DIAGNOSIS — K21.00 GASTROESOPHAGEAL REFLUX DISEASE WITH ESOPHAGITIS WITHOUT HEMORRHAGE: ICD-10-CM

## 2024-02-01 DIAGNOSIS — J98.4 RESTRICTIVE LUNG DISEASE: ICD-10-CM

## 2024-02-01 PROCEDURE — 99214 OFFICE O/P EST MOD 30 MIN: CPT | Performed by: STUDENT IN AN ORGANIZED HEALTH CARE EDUCATION/TRAINING PROGRAM

## 2024-02-01 PROCEDURE — 1123F ACP DISCUSS/DSCN MKR DOCD: CPT | Performed by: STUDENT IN AN ORGANIZED HEALTH CARE EDUCATION/TRAINING PROGRAM

## 2024-02-01 ASSESSMENT — PATIENT HEALTH QUESTIONNAIRE - PHQ9
9. THOUGHTS THAT YOU WOULD BE BETTER OFF DEAD, OR OF HURTING YOURSELF: 0
8. MOVING OR SPEAKING SO SLOWLY THAT OTHER PEOPLE COULD HAVE NOTICED. OR THE OPPOSITE, BEING SO FIGETY OR RESTLESS THAT YOU HAVE BEEN MOVING AROUND A LOT MORE THAN USUAL: 0
10. IF YOU CHECKED OFF ANY PROBLEMS, HOW DIFFICULT HAVE THESE PROBLEMS MADE IT FOR YOU TO DO YOUR WORK, TAKE CARE OF THINGS AT HOME, OR GET ALONG WITH OTHER PEOPLE: 0
SUM OF ALL RESPONSES TO PHQ QUESTIONS 1-9: 1
SUM OF ALL RESPONSES TO PHQ9 QUESTIONS 1 & 2: 0
7. TROUBLE CONCENTRATING ON THINGS, SUCH AS READING THE NEWSPAPER OR WATCHING TELEVISION: 1
4. FEELING TIRED OR HAVING LITTLE ENERGY: 0
2. FEELING DOWN, DEPRESSED OR HOPELESS: 0
SUM OF ALL RESPONSES TO PHQ QUESTIONS 1-9: 1
SUM OF ALL RESPONSES TO PHQ QUESTIONS 1-9: 1
3. TROUBLE FALLING OR STAYING ASLEEP: 0
SUM OF ALL RESPONSES TO PHQ QUESTIONS 1-9: 1
5. POOR APPETITE OR OVEREATING: 0
1. LITTLE INTEREST OR PLEASURE IN DOING THINGS: 0
6. FEELING BAD ABOUT YOURSELF - OR THAT YOU ARE A FAILURE OR HAVE LET YOURSELF OR YOUR FAMILY DOWN: 0

## 2024-02-01 NOTE — PROGRESS NOTES
Michael   2024    Meryl Bell (:  1945) is a 78 y.o. female, here for evaluation of the following medical concerns:    Chief Complaint   Patient presents with    6 Month Follow-Up    Discuss Labs        ASSESSMENT/ PLAN  1. Major depressive disorder in full remission, unspecified whether recurrent (HCC)  -Mood stable.  Continue on Wellbutrin and Prozac.    2. Stage 3a chronic kidney disease (HCC)  -Improvement in renal function noted.  Continue liberal hydration    3. Mixed hyperlipidemia  -Repeat lipid panel.  Continue on pravastatin  - Comprehensive Metabolic Panel; Future  - LIPID PANEL; Future    4. Gastroesophageal reflux disease with esophagitis without hemorrhage  -No red flag symptoms.  Stable    5. Bronchiectasis without complication (HCC)  6. Restrictive lung disease  7. VIRGIL (obstructive sleep apnea)  Following up with pulmonology.  No new symptoms    8. Irritable bowel syndrome without diarrhea  -Stable    9. Primary osteoarthritis of right knee  -Following up with orthopedic surgery.  Advised to avoid NSAIDs       Return in about 6 months (around 2024) for HLD.    Lab Review   Hospital Outpatient Visit on 2024   Component Date Value    Sodium 2024 138     Potassium 2024 3.9     Chloride 2024 102     CO2 2024 29     Anion Gap 2024 7     Glucose 2024 79     BUN 2024 15     Creatinine 2024 1.0     Est, Glom Filt Rate 2024 57 (A)     Calcium 2024 9.0        HPI  Patient is 78-year-old female with past medical history significant for depression, hyperlipidemia, GERD, IBS, stage III CKD, bronchiectasis, restrictive lung disease, sleep apnea following up with pulmonology.  She has been following up with orthopedics for right ankle pain, left knee pain.  She is not using CPAP as she did not tolerate it well.  Denies any chest pain, shortness of breath today.  Feels well in general.  No new complaints today.  Labs discussed.

## 2024-03-15 DIAGNOSIS — G47.00 INSOMNIA, UNSPECIFIED TYPE: ICD-10-CM

## 2024-03-15 RX ORDER — TRAZODONE HYDROCHLORIDE 50 MG/1
TABLET ORAL
Qty: 135 TABLET | Refills: 1 | Status: SHIPPED | OUTPATIENT
Start: 2024-03-15

## 2024-05-16 DIAGNOSIS — F32.5 MAJOR DEPRESSIVE DISORDER IN FULL REMISSION, UNSPECIFIED WHETHER RECURRENT (HCC): ICD-10-CM

## 2024-05-16 RX ORDER — FLUOXETINE HYDROCHLORIDE 20 MG/1
CAPSULE ORAL
Qty: 90 CAPSULE | Refills: 1 | Status: SHIPPED | OUTPATIENT
Start: 2024-05-16

## 2024-05-16 NOTE — TELEPHONE ENCOUNTER
Refill request for FLUOXETINE 20MG CAPSULES medication.     Name of Pharmacy- [facility]      Last visit - 2-1-2024     Pending visit - 8-1-2024    Last refill - 2-2-2024      Medication Contract signed -   Last Oarrs ran-         Additional Comments

## 2024-06-05 ENCOUNTER — TELEPHONE (OUTPATIENT)
Dept: ORTHOPEDIC SURGERY | Age: 79
End: 2024-06-05

## 2024-06-05 DIAGNOSIS — M17.12 PRIMARY OSTEOARTHRITIS OF LEFT KNEE: ICD-10-CM

## 2024-06-05 DIAGNOSIS — M17.11 PRIMARY OSTEOARTHRITIS OF RIGHT KNEE: Primary | ICD-10-CM

## 2024-06-05 DIAGNOSIS — M25.562 ACUTE PAIN OF LEFT KNEE: ICD-10-CM

## 2024-06-05 DIAGNOSIS — M22.41 CHONDROMALACIA PATELLAE OF RIGHT KNEE: ICD-10-CM

## 2024-06-05 DIAGNOSIS — M22.42 CHONDROMALACIA PATELLAE OF LEFT KNEE: ICD-10-CM

## 2024-06-05 DIAGNOSIS — M25.561 ACUTE PAIN OF RIGHT KNEE: ICD-10-CM

## 2024-06-05 NOTE — TELEPHONE ENCOUNTER
Spoke to patient and let them know that gelsyn-3 injections have been approved for their BILATERAL knees. Scheduled patient for those injections.      Eligible 6/21/24 or after

## 2024-06-24 ENCOUNTER — OFFICE VISIT (OUTPATIENT)
Dept: ORTHOPEDIC SURGERY | Age: 79
End: 2024-06-24
Payer: MEDICARE

## 2024-06-24 VITALS — BODY MASS INDEX: 26.12 KG/M2 | WEIGHT: 153 LBS | HEIGHT: 64 IN

## 2024-06-24 DIAGNOSIS — G89.29 CHRONIC PAIN OF RIGHT ANKLE: ICD-10-CM

## 2024-06-24 DIAGNOSIS — M19.071 LOCALIZED OSTEOARTHRITIS OF RIGHT ANKLE: ICD-10-CM

## 2024-06-24 DIAGNOSIS — M22.41 CHONDROMALACIA PATELLAE OF RIGHT KNEE: ICD-10-CM

## 2024-06-24 DIAGNOSIS — M25.561 ACUTE PAIN OF RIGHT KNEE: ICD-10-CM

## 2024-06-24 DIAGNOSIS — M25.571 CHRONIC PAIN OF RIGHT ANKLE: ICD-10-CM

## 2024-06-24 DIAGNOSIS — M21.41 PES PLANUS OF BOTH FEET: ICD-10-CM

## 2024-06-24 DIAGNOSIS — M21.42 PES PLANUS OF BOTH FEET: ICD-10-CM

## 2024-06-24 DIAGNOSIS — M17.11 PRIMARY OSTEOARTHRITIS OF RIGHT KNEE: Primary | ICD-10-CM

## 2024-06-24 PROCEDURE — 20610 DRAIN/INJ JOINT/BURSA W/O US: CPT | Performed by: FAMILY MEDICINE

## 2024-06-24 PROCEDURE — 99213 OFFICE O/P EST LOW 20 MIN: CPT | Performed by: FAMILY MEDICINE

## 2024-06-24 PROCEDURE — 1123F ACP DISCUSS/DSCN MKR DOCD: CPT | Performed by: FAMILY MEDICINE

## 2024-06-24 NOTE — PROGRESS NOTES
symptomatic left knee significant medial compartment osteoarthritis with bone-on-bone patellofemoral arthropathy with left knee Gelsyn-3 No. 1   #2.  Recurrent symptomatic advanced right knee osteoarthritis with near bone-on-bone changes right knee medial compartment osteoarthritis with recurrent knee pain and low-grade synovitis with Gelsyn-3 injection #1 right knee.  #3.  2+ months status post improved aggravation right ankle significant osteoarthritis with recurrent ankle synovitis with history of posttraumatic peroneal tendinitis and pes planus  #4.  History of left wrist capsule synovitis with spraining and aggravation of underlying intercarpal/first CMC osteoarthritis with tenosynovitis and spraining (MRI negative for occult fracture )    Impression:  Encounter Diagnoses   Name Primary?    Primary osteoarthritis of right knee Yes    Acute pain of right knee     Chondromalacia patellae of right knee     Pes planus of both feet     Localized osteoarthritis of right ankle     Chronic pain of right ankle        Office Procedures:  Orders Placed This Encounter   Procedures    ME ARTHROCENTESIS ASPIR&/INJ MAJOR JT/BURSA W/O US       Treatment Plan:  Treatment options were discussed with Meryl Bell.  We did once again review her updated plain films and exam findings.  Does have quite prominent anterior medial compartment osteoarthritis.  She is known to have fairly advanced medial compartment osteoarthritis and completed viscosupplementation with Gelsyn-3 on 10/2/2023 to her left knee.  She was seen a couple weeks ago for worsening of her right knee symptoms which have improved also does have degeneration on that side.  She is hoping to put off knee arthroplasty as long as she can.  After discussion of pros and cons of viscosupplementation, she did receive her first injection of Gelsyn-3 to her knees bilaterally.  This was performed using the standard prefilled 2 cc syringe.  Recently her ankles been a little bit

## 2024-07-07 DIAGNOSIS — E78.5 HYPERLIPIDEMIA: ICD-10-CM

## 2024-07-08 ENCOUNTER — OFFICE VISIT (OUTPATIENT)
Dept: ORTHOPEDIC SURGERY | Age: 79
End: 2024-07-08
Payer: MEDICARE

## 2024-07-08 DIAGNOSIS — M25.561 ACUTE PAIN OF RIGHT KNEE: ICD-10-CM

## 2024-07-08 DIAGNOSIS — M17.12 PRIMARY OSTEOARTHRITIS OF LEFT KNEE: ICD-10-CM

## 2024-07-08 DIAGNOSIS — M17.11 PRIMARY OSTEOARTHRITIS OF RIGHT KNEE: Primary | ICD-10-CM

## 2024-07-08 DIAGNOSIS — M25.562 ACUTE PAIN OF LEFT KNEE: ICD-10-CM

## 2024-07-08 DIAGNOSIS — M22.41 CHONDROMALACIA PATELLAE OF RIGHT KNEE: ICD-10-CM

## 2024-07-08 DIAGNOSIS — M22.42 CHONDROMALACIA PATELLAE OF LEFT KNEE: ICD-10-CM

## 2024-07-08 PROCEDURE — 1123F ACP DISCUSS/DSCN MKR DOCD: CPT | Performed by: FAMILY MEDICINE

## 2024-07-08 PROCEDURE — 20610 DRAIN/INJ JOINT/BURSA W/O US: CPT | Performed by: FAMILY MEDICINE

## 2024-07-08 PROCEDURE — 99213 OFFICE O/P EST LOW 20 MIN: CPT | Performed by: FAMILY MEDICINE

## 2024-07-08 RX ORDER — PRAVASTATIN SODIUM 20 MG
TABLET ORAL
Qty: 90 TABLET | Refills: 1 | Status: SHIPPED | OUTPATIENT
Start: 2024-07-08

## 2024-07-08 RX ORDER — BUPROPION HYDROCHLORIDE 150 MG/1
TABLET, EXTENDED RELEASE ORAL
Qty: 90 TABLET | Refills: 1 | Status: SHIPPED | OUTPATIENT
Start: 2024-07-08

## 2024-07-08 NOTE — TELEPHONE ENCOUNTER
Refill request for Burpropion ( wellbutrin sr) 150 mg extended release  Pravastatin 20 mg   medication.     Name of Pharmacy- [facility]       Last visit - 2/1/24     Pending visit - 8/1/24    Last refill -1/9/24      Medication Contract signed -   Last Oarrs ran-         Additional Comments

## 2024-07-08 NOTE — PROGRESS NOTES
Chief Complaint  Ankle Pain and Knee Pain    FU worsening right knee pain with swelling and occasional pseudo buckling status post remote arthroscopic intervention with meniscectomy 10.5+ years ago    FU left knee pain with with underlying significant left knee osteoarthritis status post completion of GELSYN-3 10/2/2023 with recent worsening of left knee pain    Recent worsening right ankle pain with history of right ankle osteoarthritis status post left steroid injection 8/7/2023    History of fall 9/5/2021 with MRI documented left wrist capsulitis with spraining and first CMC osteoarthritis    History of Present Illness:  Meryl Bell is a 78 y.o. 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  She does have a history of significant patellofemoral arthropathy and knee osteoarthritis to her contralateral right knee treated with a one-time steroid injection by Dr. Clint Cheek in 2015.  She states that a week ago on about 1/4/2020 when she was kneeling down vacuuming some carpeted steps at home when she felt a pop primarily to the anterior portion of her left knee prompting today's visit.  She has had a fairly consistent achy 5-6 out of 10 pain with her knee with pain with positional changes distance walking crossing her legs walking on uneven surfaces and going up and down stairs.  She has taken some Excedrin ice and Aspercreme but this has not improved her symptoms.  Initially she was having some night pain but this has improved.  No active locking or catching.  Her symptoms have not substantially improved over the last week prompting today's visit and she is being seen today for orthopedic and sports consultation with updated imaging.  She is not having any instability or buckling symptoms.    Meryl was last seen in the office on 1/11/2021 and was diagnosed with significant

## 2024-07-11 ENCOUNTER — OFFICE VISIT (OUTPATIENT)
Dept: PULMONOLOGY | Age: 79
End: 2024-07-11
Payer: MEDICARE

## 2024-07-11 VITALS
BODY MASS INDEX: 24.75 KG/M2 | HEIGHT: 64 IN | SYSTOLIC BLOOD PRESSURE: 108 MMHG | DIASTOLIC BLOOD PRESSURE: 71 MMHG | WEIGHT: 145 LBS | RESPIRATION RATE: 16 BRPM | HEART RATE: 91 BPM | TEMPERATURE: 98 F | OXYGEN SATURATION: 93 %

## 2024-07-11 DIAGNOSIS — J47.9 BRONCHIECTASIS WITHOUT COMPLICATION (HCC): ICD-10-CM

## 2024-07-11 DIAGNOSIS — J98.4 RESTRICTIVE LUNG DISEASE: Primary | ICD-10-CM

## 2024-07-11 DIAGNOSIS — G47.33 OBSTRUCTIVE SLEEP APNEA SYNDROME: ICD-10-CM

## 2024-07-11 PROCEDURE — 99213 OFFICE O/P EST LOW 20 MIN: CPT | Performed by: NURSE PRACTITIONER

## 2024-07-11 PROCEDURE — 1123F ACP DISCUSS/DSCN MKR DOCD: CPT | Performed by: NURSE PRACTITIONER

## 2024-07-11 ASSESSMENT — SLEEP AND FATIGUE QUESTIONNAIRES
HOW LIKELY ARE YOU TO NOD OFF OR FALL ASLEEP WHILE SITTING AND READING: SLIGHT CHANCE OF DOZING
HOW LIKELY ARE YOU TO NOD OFF OR FALL ASLEEP WHILE SITTING AND TALKING TO SOMEONE: WOULD NEVER DOZE
HOW LIKELY ARE YOU TO NOD OFF OR FALL ASLEEP WHILE LYING DOWN TO REST IN THE AFTERNOON WHEN CIRCUMSTANCES PERMIT: HIGH CHANCE OF DOZING
HOW LIKELY ARE YOU TO NOD OFF OR FALL ASLEEP IN A CAR, WHILE STOPPED FOR A FEW MINUTES IN TRAFFIC: WOULD NEVER DOZE
HOW LIKELY ARE YOU TO NOD OFF OR FALL ASLEEP WHEN YOU ARE A PASSENGER IN A CAR FOR AN HOUR WITHOUT A BREAK: SLIGHT CHANCE OF DOZING
HOW LIKELY ARE YOU TO NOD OFF OR FALL ASLEEP WHILE SITTING QUIETLY AFTER LUNCH WITHOUT ALCOHOL: HIGH CHANCE OF DOZING
HOW LIKELY ARE YOU TO NOD OFF OR FALL ASLEEP WHILE WATCHING TV: SLIGHT CHANCE OF DOZING
ESS TOTAL SCORE: 9
HOW LIKELY ARE YOU TO NOD OFF OR FALL ASLEEP WHILE SITTING INACTIVE IN A PUBLIC PLACE: WOULD NEVER DOZE

## 2024-07-11 ASSESSMENT — ENCOUNTER SYMPTOMS
RHINORRHEA: 0
SORE THROAT: 0
SHORTNESS OF BREATH: 0
WHEEZING: 0
ABDOMINAL PAIN: 0
COUGH: 0
EYE PAIN: 0
CHEST TIGHTNESS: 0

## 2024-07-11 NOTE — PATIENT INSTRUCTIONS
Call with worsening symptoms such as increased shortness of breath, productive cough, wheezing or symptoms not responding to treatment plan.     She is to continue using positional therapy.    Advised to avoid driving when too sleepy to function safely. Discussed the risks of untreated apnea such as accidents, cognitive impairment, mood impairment, high blood pressure, various cardiac diseases and stroke.       Return to clinic in 1 year  with Dr. Vieira.   Remember to bring a list of pulmonary medications and any CPAP or BiPAP machines to your next appointment with the office.     Please keep all of your future appointments scheduled by [facility] office. Out of respect for other patients and providers, you may be asked to reschedule your appointment if you arrive later than your scheduled appointment time. Appointments cancelled less than 24hrs in advance will be considered a no show. Patients with three missed appointments within 1 year or four missed appointments within 2 years can be dismissed from the practice.     Please be aware that our physicians are required to work in the Intensive Care Unit at [facility].  Your appointment may need to be rescheduled if they are designated to work during your appointment time.      You may receive a survey regarding the care you received during your visit.  Your input is valuable to us.  We encourage you to complete and return your survey.  We hope you will choose us in the future for your healthcare needs.     Pt instructed of all future appointment dates & times, including radiology, labs, procedures & referrals. If procedures were scheduled preparation instructions provided. Instructions on future appointments with [facility] were given.      In the next few weeks, you will be receiving a survey from [facility] regarding your visit today.  We would greatly appreciate it if you would take just a few minutes

## 2024-07-11 NOTE — PROGRESS NOTES
Meryl Bell is a 78 y.o. who comes in today for Follow-up (Patient here for pulmonary follow up. )   States her breathing has been doing well. She is no longer using albuterol.  She continues Zyrtec.   States her SOB has improved. She does not notice getting SOB with climbing stairs anymore.   She denies CP, cough, or wheezing.  She completed pulmonary rehab and continues to stay active.     Diagnosed with mild obstructive sleep apnea on the study done 11/2022. (AHI 9.9, desat to 83%, weight 147 lbs) .  She was not able to tolerate PAP therapy.  She is using positional therapy and this is working well for her.  Denies sleep issues.  She continues trazadone from PCP to help with sleep.       Past Medical History:   Diagnosis Date    Anxiety     Arthritis     Bronchiectasis (HCC) 2014    Bronchoscopy.    Cancer (HCC)     skin    Cervical radiculopathy 05/2012    LT. c-5 c-6 MRI    Depression     Dupuytren's contracture     Dyspnea on exertion 02/22/2022    Echo pharmacological stress test: Normal dobutamine stress echocardiogram.    Dyspnea on exertion 02/22/2022    Dobutamine pharmacological stress test normal.    Fractures     Hyperlipidemia     Hyperlipidemia     IBS (irritable bowel syndrome)     Insomnia     Migraine     past hx    VIRGIL on CPAP 11/2022    Reflux     Restrictive lung disease 05/26/2022    Pulmonary function test: DX restrictive lung disease.  Seen by Dr. Jose Altman 11/2013    Left Face.    SOB (shortness of breath) 03/23/2017    GXT  Normal        Past Surgical History:   Procedure Laterality Date    BRONCHOSCOPY  9/11/14    Right Middle Lobe Volume Loss:Negative Pathology    CATARACT REMOVAL      bilateral    CHOLECYSTECTOMY  07/29/14    DaVinci assisted laparoscopic cholecystetomy    COLONOSCOPY  03/2003    negative    DILATION AND CURETTAGE OF UTERUS      x 2    EYE SURGERY Bilateral     catarct    HAND SURGERY Right 10/19/2017    dupuytren's disease excision including middle and ring

## 2024-07-11 NOTE — PROGRESS NOTES
MA Communication:  The following orders are received by verbal communication from Dee Spencer CNP    Orders include:  1 year silvia

## 2024-07-15 ENCOUNTER — OFFICE VISIT (OUTPATIENT)
Dept: ORTHOPEDIC SURGERY | Age: 79
End: 2024-07-15
Payer: MEDICARE

## 2024-07-15 DIAGNOSIS — M17.11 PRIMARY OSTEOARTHRITIS OF RIGHT KNEE: Primary | ICD-10-CM

## 2024-07-15 DIAGNOSIS — M22.41 CHONDROMALACIA PATELLAE OF RIGHT KNEE: ICD-10-CM

## 2024-07-15 DIAGNOSIS — M22.42 CHONDROMALACIA PATELLAE OF LEFT KNEE: ICD-10-CM

## 2024-07-15 DIAGNOSIS — M25.561 ACUTE PAIN OF RIGHT KNEE: ICD-10-CM

## 2024-07-15 DIAGNOSIS — M17.12 PRIMARY OSTEOARTHRITIS OF LEFT KNEE: ICD-10-CM

## 2024-07-15 DIAGNOSIS — M25.562 ACUTE PAIN OF LEFT KNEE: ICD-10-CM

## 2024-07-15 PROCEDURE — 1123F ACP DISCUSS/DSCN MKR DOCD: CPT | Performed by: FAMILY MEDICINE

## 2024-07-15 PROCEDURE — 20610 DRAIN/INJ JOINT/BURSA W/O US: CPT | Performed by: FAMILY MEDICINE

## 2024-07-15 PROCEDURE — 99213 OFFICE O/P EST LOW 20 MIN: CPT | Performed by: FAMILY MEDICINE

## 2024-07-15 NOTE — PROGRESS NOTES
Chief Complaint  Knee Pain (GELSYN-3 #3 LAURA KNEES ) and Ankle Pain    FU worsening right knee pain with swelling and occasional pseudo buckling status post remote arthroscopic intervention with meniscectomy 10.5+ years ago    FU left knee pain with with underlying significant left knee osteoarthritis status post completion of GELSYN-3 10/2/2023 with recent worsening of left knee pain    Recent worsening right ankle pain with history of right ankle osteoarthritis status post left steroid injection 8/7/2023    History of fall 9/5/2021 with MRI documented left wrist capsulitis with spraining and first CMC osteoarthritis    History of Present Illness:  Meryl Bell is a 78 y.o. 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  She does have a history of significant patellofemoral arthropathy and knee osteoarthritis to her contralateral right knee treated with a one-time steroid injection by Dr. Clint Cheek in 2015.  She states that a week ago on about 1/4/2020 when she was kneeling down vacuuming some carpeted steps at home when she felt a pop primarily to the anterior portion of her left knee prompting today's visit.  She has had a fairly consistent achy 5-6 out of 10 pain with her knee with pain with positional changes distance walking crossing her legs walking on uneven surfaces and going up and down stairs.  She has taken some Excedrin ice and Aspercreme but this has not improved her symptoms.  Initially she was having some night pain but this has improved.  No active locking or catching.  Her symptoms have not substantially improved over the last week prompting today's visit and she is being seen today for orthopedic and sports consultation with updated imaging.  She is not having any instability or buckling symptoms.    Meryl was last seen in the office on 1/11/2021 and was

## 2024-07-31 ENCOUNTER — HOSPITAL ENCOUNTER (OUTPATIENT)
Age: 79
Discharge: HOME | End: 2024-07-31
Payer: MEDICARE

## 2024-07-31 DIAGNOSIS — E78.2 MIXED HYPERLIPIDEMIA: ICD-10-CM

## 2024-07-31 LAB
ALBUMIN SERPL-MCNC: 4.1 G/DL (ref 3.4–5)
ALBUMIN/GLOB SERPL: 1.5 {RATIO} (ref 1.1–2.2)
ALP SERPL-CCNC: 57 U/L (ref 40–129)
ALT SERPL-CCNC: 10 U/L (ref 10–40)
ANION GAP SERPL CALCULATED.3IONS-SCNC: 9 MMOL/L (ref 3–16)
AST SERPL-CCNC: 18 U/L (ref 15–37)
BILIRUB SERPL-MCNC: 0.4 MG/DL (ref 0–1)
BUN SERPL-MCNC: 19 MG/DL (ref 7–20)
CALCIUM SERPL-MCNC: 9.5 MG/DL (ref 8.3–10.6)
CHLORIDE SERPL-SCNC: 105 MMOL/L (ref 99–110)
CHOLEST SERPL-MCNC: 168 MG/DL (ref 0–199)
CO2 SERPL-SCNC: 28 MMOL/L (ref 21–32)
CREAT SERPL-MCNC: 1.2 MG/DL (ref 0.6–1.2)
GFR SERPLBLD CREATININE-BSD FMLA CKD-EPI: 46 ML/MIN/{1.73_M2}
GLUCOSE SERPL-MCNC: 77 MG/DL (ref 70–99)
HDLC SERPL-MCNC: 58 MG/DL (ref 40–60)
LDLC SERPL CALC-MCNC: 93 MG/DL
POTASSIUM SERPL-SCNC: 4.2 MMOL/L (ref 3.5–5.1)
PROT SERPL-MCNC: 6.8 G/DL (ref 6.4–8.2)
SODIUM SERPL-SCNC: 142 MMOL/L (ref 136–145)
TRIGL SERPL-MCNC: 85 MG/DL (ref 0–150)
VLDLC SERPL CALC-MCNC: 17 MG/DL

## 2024-07-31 PROCEDURE — 80053 COMPREHEN METABOLIC PANEL: CPT

## 2024-07-31 PROCEDURE — 36415 COLL VENOUS BLD VENIPUNCTURE: CPT

## 2024-07-31 PROCEDURE — 80061 LIPID PANEL: CPT

## 2024-08-01 ENCOUNTER — OFFICE VISIT (OUTPATIENT)
Dept: INTERNAL MEDICINE CLINIC | Age: 79
End: 2024-08-01

## 2024-08-01 VITALS
DIASTOLIC BLOOD PRESSURE: 72 MMHG | SYSTOLIC BLOOD PRESSURE: 117 MMHG | OXYGEN SATURATION: 95 % | HEIGHT: 62 IN | BODY MASS INDEX: 25.83 KG/M2 | WEIGHT: 140.4 LBS | HEART RATE: 94 BPM

## 2024-08-01 DIAGNOSIS — K21.00 GASTROESOPHAGEAL REFLUX DISEASE WITH ESOPHAGITIS WITHOUT HEMORRHAGE: ICD-10-CM

## 2024-08-01 DIAGNOSIS — M17.11 PRIMARY OSTEOARTHRITIS OF RIGHT KNEE: ICD-10-CM

## 2024-08-01 DIAGNOSIS — Z00.00 MEDICARE ANNUAL WELLNESS VISIT, SUBSEQUENT: Primary | ICD-10-CM

## 2024-08-01 DIAGNOSIS — J98.4 RESTRICTIVE LUNG DISEASE: ICD-10-CM

## 2024-08-01 DIAGNOSIS — F32.5 MAJOR DEPRESSIVE DISORDER IN FULL REMISSION, UNSPECIFIED WHETHER RECURRENT (HCC): ICD-10-CM

## 2024-08-01 DIAGNOSIS — K58.9 IRRITABLE BOWEL SYNDROME WITHOUT DIARRHEA: ICD-10-CM

## 2024-08-01 DIAGNOSIS — G47.00 INSOMNIA, UNSPECIFIED TYPE: ICD-10-CM

## 2024-08-01 DIAGNOSIS — J47.9 BRONCHIECTASIS WITHOUT COMPLICATION (HCC): ICD-10-CM

## 2024-08-01 DIAGNOSIS — N18.31 STAGE 3A CHRONIC KIDNEY DISEASE (HCC): ICD-10-CM

## 2024-08-01 DIAGNOSIS — G47.33 OSA (OBSTRUCTIVE SLEEP APNEA): ICD-10-CM

## 2024-08-01 DIAGNOSIS — E78.2 MIXED HYPERLIPIDEMIA: ICD-10-CM

## 2024-08-01 SDOH — ECONOMIC STABILITY: FOOD INSECURITY: WITHIN THE PAST 12 MONTHS, YOU WORRIED THAT YOUR FOOD WOULD RUN OUT BEFORE YOU GOT MONEY TO BUY MORE.: NEVER TRUE

## 2024-08-01 SDOH — ECONOMIC STABILITY: FOOD INSECURITY: WITHIN THE PAST 12 MONTHS, THE FOOD YOU BOUGHT JUST DIDN'T LAST AND YOU DIDN'T HAVE MONEY TO GET MORE.: NEVER TRUE

## 2024-08-01 SDOH — ECONOMIC STABILITY: INCOME INSECURITY: HOW HARD IS IT FOR YOU TO PAY FOR THE VERY BASICS LIKE FOOD, HOUSING, MEDICAL CARE, AND HEATING?: NOT HARD AT ALL

## 2024-08-01 ASSESSMENT — LIFESTYLE VARIABLES
HOW OFTEN DO YOU HAVE A DRINK CONTAINING ALCOHOL: MONTHLY OR LESS
HOW MANY STANDARD DRINKS CONTAINING ALCOHOL DO YOU HAVE ON A TYPICAL DAY: 1 OR 2

## 2024-08-01 ASSESSMENT — PATIENT HEALTH QUESTIONNAIRE - PHQ9
7. TROUBLE CONCENTRATING ON THINGS, SUCH AS READING THE NEWSPAPER OR WATCHING TELEVISION: NOT AT ALL
2. FEELING DOWN, DEPRESSED OR HOPELESS: NOT AT ALL
8. MOVING OR SPEAKING SO SLOWLY THAT OTHER PEOPLE COULD HAVE NOTICED. OR THE OPPOSITE, BEING SO FIGETY OR RESTLESS THAT YOU HAVE BEEN MOVING AROUND A LOT MORE THAN USUAL: NOT AT ALL
9. THOUGHTS THAT YOU WOULD BE BETTER OFF DEAD, OR OF HURTING YOURSELF: NOT AT ALL
SUM OF ALL RESPONSES TO PHQ QUESTIONS 1-9: 0
5. POOR APPETITE OR OVEREATING: NOT AT ALL
SUM OF ALL RESPONSES TO PHQ QUESTIONS 1-9: 0
4. FEELING TIRED OR HAVING LITTLE ENERGY: NOT AT ALL
6. FEELING BAD ABOUT YOURSELF - OR THAT YOU ARE A FAILURE OR HAVE LET YOURSELF OR YOUR FAMILY DOWN: NOT AT ALL
SUM OF ALL RESPONSES TO PHQ QUESTIONS 1-9: 0
3. TROUBLE FALLING OR STAYING ASLEEP: NOT AT ALL
SUM OF ALL RESPONSES TO PHQ QUESTIONS 1-9: 0
10. IF YOU CHECKED OFF ANY PROBLEMS, HOW DIFFICULT HAVE THESE PROBLEMS MADE IT FOR YOU TO DO YOUR WORK, TAKE CARE OF THINGS AT HOME, OR GET ALONG WITH OTHER PEOPLE: NOT DIFFICULT AT ALL
SUM OF ALL RESPONSES TO PHQ9 QUESTIONS 1 & 2: 0
1. LITTLE INTEREST OR PLEASURE IN DOING THINGS: NOT AT ALL

## 2024-08-01 NOTE — PROGRESS NOTES
Medicare Annual Wellness Visit    Meryl Bell is here for Hyperlipidemia, Medicare AWV, and 6 Month Follow-Up (Labs completed 07/31/24)    Assessment & Plan   Medicare annual wellness visit, subsequent  Major depressive disorder in full remission, unspecified whether recurrent (HCC)  Insomnia, unspecified type  -Mood stable at this time.  Continue on Prozac, Wellbutrin  Stage 3a chronic kidney disease (HCC)  -Creatinine around baseline.  Advised to avoid NSAIDs.  Encouraged liberal water intake  Mixed hyperlipidemia  -LDL is at goal today.  Continue on pravastatin  Gastroesophageal reflux disease with esophagitis without hemorrhage  -Well-controlled on omeprazole.  Continue  Bronchiectasis without complication (HCC)  Restrictive lung disease  VIRGIL (obstructive sleep apnea)  -Following with pulmonology  Irritable bowel syndrome without diarrhea  -Symptoms stable at this time  Primary osteoarthritis of right knee  -Sees Ortho.  Recently had knee injections with good response    Recommendations for Preventive Services Due: see orders and patient instructions/AVS.  Recommended screening schedule for the next 5-10 years is provided to the patient in written form: see Patient Instructions/AVS.       Subjective       Patient's complete Health Risk Assessment and screening values have been reviewed and are found in Flowsheets. The following problems were reviewed today and where indicated follow up appointments were made and/or referrals ordered.    Positive Risk Factor Screenings with Interventions:                      Safety:  Do you have non-slip mats or non-slip surfaces or shower bars or grab bars in your shower or bathtub?: (!) No  Interventions:  See AVS for additional education material                   Objective   Vitals:    08/01/24 1257   BP: 117/72   Site: Right Upper Arm   Position: Sitting   Cuff Size: Medium Adult   Pulse: 94   SpO2: 95%   Weight: 63.7 kg (140 lb 6.4 oz)   Height: 1.575 m (5' 2\")

## 2024-08-15 ENCOUNTER — OFFICE VISIT (OUTPATIENT)
Dept: ORTHOPEDIC SURGERY | Age: 79
End: 2024-08-15
Payer: MEDICARE

## 2024-08-15 VITALS — BODY MASS INDEX: 25.76 KG/M2 | HEIGHT: 62 IN | WEIGHT: 140 LBS

## 2024-08-15 DIAGNOSIS — M25.562 ACUTE PAIN OF LEFT KNEE: Primary | ICD-10-CM

## 2024-08-15 PROCEDURE — 99213 OFFICE O/P EST LOW 20 MIN: CPT

## 2024-08-15 PROCEDURE — 1123F ACP DISCUSS/DSCN MKR DOCD: CPT

## 2024-08-15 RX ORDER — CELECOXIB 100 MG/1
100 CAPSULE ORAL DAILY
Qty: 30 CAPSULE | Refills: 0 | Status: SHIPPED | OUTPATIENT
Start: 2024-08-15

## 2024-08-15 NOTE — PROGRESS NOTES
Chief Complaint    Knee Pain (left)      History of Present Illness:  Meryl Bell is a 78 y.o. female who presents to the after hours clinic for left knee pain. Patient has history of severe varus OA and recently underwent visco injections with Dr. Sampson. She had been noticing some improvement until 8/14/24 when her left knee gave way causing her to sustain a mechanical fall. Since then she has had in increase in her medial sided knee pain, localize to the proximal tibia. Pain is constant , worse with weight bearing.  Patient describes their pain as 6/10.  The patient denies any specific injury.  The patient denies any clicking, popping, or locking of the joint. The patient denies any numbness, paresthesias, or weakness.      Pain Assessment  Location of Pain: Knee  Location Modifiers: Left  Severity of Pain: 6  Quality of Pain: Other (Comment)  Duration of Pain: Other (Comment)  Frequency of Pain: Other (Comment)    Past Medical History:   Diagnosis Date    Anxiety     Arthritis     Bronchiectasis (HCC) 2014    Bronchoscopy.    Cancer (HCC)     skin    Cervical radiculopathy 05/2012    LT. c-5 c-6 MRI    Depression     Dupuytren's contracture     Dyspnea on exertion 02/22/2022    Echo pharmacological stress test: Normal dobutamine stress echocardiogram.    Dyspnea on exertion 02/22/2022    Dobutamine pharmacological stress test normal.    Fractures     Hyperlipidemia     Hyperlipidemia     IBS (irritable bowel syndrome)     Insomnia     Migraine     past hx    VIRGIL on CPAP 11/2022    Reflux     Restrictive lung disease 05/26/2022    Pulmonary function test: DX restrictive lung disease.  Seen by Dr. Jose Altman 11/2013    Left Face.    SOB (shortness of breath) 03/23/2017    GXT  Normal        Past Surgical History:   Procedure Laterality Date    BRONCHOSCOPY  9/11/14    Right Middle Lobe Volume Loss:Negative Pathology    CATARACT REMOVAL      bilateral    CHOLECYSTECTOMY  07/29/14    DaVinci assisted

## 2024-09-09 ENCOUNTER — HOSPITAL ENCOUNTER (OUTPATIENT)
Dept: WOMENS IMAGING | Age: 79
Discharge: HOME | End: 2024-09-09
Payer: MEDICARE

## 2024-09-09 VITALS — WEIGHT: 140 LBS | HEIGHT: 62 IN | BODY MASS INDEX: 25.76 KG/M2

## 2024-09-09 DIAGNOSIS — Z12.31 SCREENING MAMMOGRAM FOR BREAST CANCER: ICD-10-CM

## 2024-09-09 PROCEDURE — 77063 BREAST TOMOSYNTHESIS BI: CPT

## 2024-09-11 RX ORDER — CELECOXIB 100 MG/1
100 CAPSULE ORAL DAILY
Qty: 30 CAPSULE | Refills: 0 | Status: SHIPPED | OUTPATIENT
Start: 2024-09-11

## 2024-09-23 ENCOUNTER — OFFICE VISIT (OUTPATIENT)
Dept: ORTHOPEDIC SURGERY | Age: 79
End: 2024-09-23

## 2024-09-23 VITALS — HEIGHT: 62 IN | BODY MASS INDEX: 25.76 KG/M2 | WEIGHT: 140 LBS

## 2024-09-23 DIAGNOSIS — M43.16 SPONDYLOLISTHESIS OF LUMBAR REGION: ICD-10-CM

## 2024-09-23 DIAGNOSIS — M54.50 LUMBAR PAIN: Primary | ICD-10-CM

## 2024-09-23 DIAGNOSIS — M79.18 MYOFASCIAL PAIN SYNDROME OF LUMBAR SPINE: ICD-10-CM

## 2024-09-23 RX ORDER — METHYLPREDNISOLONE 4 MG
TABLET, DOSE PACK ORAL
Qty: 21 KIT | Refills: 0 | Status: SHIPPED | OUTPATIENT
Start: 2024-09-23

## 2024-09-27 ENCOUNTER — HOSPITAL ENCOUNTER (OUTPATIENT)
Dept: PHYSICAL THERAPY | Age: 79
Setting detail: THERAPIES SERIES
Discharge: HOME | End: 2024-09-27
Payer: MEDICARE

## 2024-09-27 PROCEDURE — 97161 PT EVAL LOW COMPLEX 20 MIN: CPT | Performed by: PHYSICAL THERAPIST

## 2024-09-27 PROCEDURE — 97110 THERAPEUTIC EXERCISES: CPT | Performed by: PHYSICAL THERAPIST

## 2024-09-27 PROCEDURE — 97140 MANUAL THERAPY 1/> REGIONS: CPT | Performed by: PHYSICAL THERAPIST

## 2024-10-04 ENCOUNTER — HOSPITAL ENCOUNTER (OUTPATIENT)
Dept: PHYSICAL THERAPY | Age: 79
Setting detail: THERAPIES SERIES
Discharge: HOME | End: 2024-10-04
Payer: MEDICARE

## 2024-10-04 PROCEDURE — 97112 NEUROMUSCULAR REEDUCATION: CPT | Performed by: PHYSICAL THERAPIST

## 2024-10-04 PROCEDURE — 97110 THERAPEUTIC EXERCISES: CPT | Performed by: PHYSICAL THERAPIST

## 2024-10-04 PROCEDURE — 97140 MANUAL THERAPY 1/> REGIONS: CPT | Performed by: PHYSICAL THERAPIST

## 2024-10-04 NOTE — FLOWSHEET NOTE
[facility]- Outpatient Rehabilitation and Therapy  [address], [place] [address] office: [phone] fax: [phone]     Physical Therapy Initial Evaluation Certification      Dear Dameon Sampson MD,    We had the pleasure of evaluating the following patient for physical therapy services at [facility].  A summary of our findings can be found in the initial assessment below.  This includes our plan of care.  If you have any questions or concerns regarding these findings, please do not hesitate to contact me at the office phone number listed above.  Thank you for the referral.     Physician Signature:_______________________________Date:__________________  By signing above (or electronic signature), therapist’s plan is approved by physician       Physical Therapy: TREATMENT/PROGRESS NOTE   Patient: Meryl Bell (79 y.o. female)   Examination Date: 10/04/2024   :  1945 MRN: [id number]   Visit #: 2   Insurance Allowable Auth Needed   $20 copay/ auth needed [x]Yes    []No    Insurance: Payor: [facility] MEDICARE / Plan: [facility] MEDICARE ADVANTAGE / Product Type: *No Product type* /   Insurance ID: [id number] - (Medicare Managed)  Secondary Insurance (if applicable):    Treatment Diagnosis:   SI joint M53.2X8, lumbar DDD M51.36   Medical Diagnosis:  No admission diagnoses are documented for this encounter.   Referring Physician: Dameon Sampson MD  PCP: Drew De Jesus MD     Plan of care signed (Y/N):     Date of Patient follow up with Physician:      Plan of Care Report: NO  POC update due: (10 visits /OR AUTH LIMITS, whichever is less)  10/25/2024                                             Medical History:  Comorbidities:  Osteoarthritis  Anxiety  Depression  Relevant Medical History: bilateral knee OA,  OA in lower cervical / upper thoracic.                                           Precautions/ Contra-indications:           Latex allergy:

## 2024-10-08 ENCOUNTER — APPOINTMENT (OUTPATIENT)
Dept: PHYSICAL THERAPY | Age: 79
End: 2024-10-08
Payer: MEDICARE

## 2024-10-11 ENCOUNTER — APPOINTMENT (OUTPATIENT)
Dept: PHYSICAL THERAPY | Age: 79
End: 2024-10-11
Payer: MEDICARE

## 2024-10-21 RX ORDER — CELECOXIB 100 MG/1
100 CAPSULE ORAL DAILY
Qty: 30 CAPSULE | Refills: 0 | Status: SHIPPED | OUTPATIENT
Start: 2024-10-21

## 2024-10-21 NOTE — TELEPHONE ENCOUNTER
Patient was seen a month ago at after hours ortho clinic. States she has a stress fracture of left knee. Was prescribed Celebrex. She is asking if you could refill for her?    Rx pended.

## 2024-10-25 ENCOUNTER — HOSPITAL ENCOUNTER (OUTPATIENT)
Dept: PHYSICAL THERAPY | Age: 79
Setting detail: THERAPIES SERIES
Discharge: HOME | End: 2024-10-25
Payer: MEDICARE

## 2024-10-25 PROCEDURE — 97112 NEUROMUSCULAR REEDUCATION: CPT | Performed by: PHYSICAL THERAPIST

## 2024-10-25 PROCEDURE — 97110 THERAPEUTIC EXERCISES: CPT | Performed by: PHYSICAL THERAPIST

## 2024-10-25 PROCEDURE — 97140 MANUAL THERAPY 1/> REGIONS: CPT | Performed by: PHYSICAL THERAPIST

## 2024-10-25 NOTE — FLOWSHEET NOTE
OBJECTIVE EXAMINATION     9/27/24  ROM/Strength: (Blank cells denote NT)      Mvmt (norm) PROM L PROM R Notes     LUMBAR Flex (90) WNL      Ext (25) Pain limited by 75%      SB (25) Pain  limited 25% WNL     Rotation (30)          HIP Flex (120) 100 100     Abd (45) 40 40     ER (50) 45 45     IR (45)       Ext (20)      KNEE Flex (140) 121 120     Ext (0) 6 6      ANKLE DF (20) 0 0     PF (50)       Inversion (30)       Eversion (20)          MMT L MMT R Notes       HIP  Flexion 4+ 4+      Abduction        ER        IR        Extension      KNEE  Flexion 4+ 4+      Extension 4+ 4+      ANKLE  DF 4+ 4+      PF        Inversion        Eversion           Exercises/Interventions     Therapeutic Ex (43785)  resistance Sets/time Reps Notes/Cues/Progressions          Supine Hip abd/ add iso  :05 X 15 hep   Supine fig 4 s  :20 2x hep   hep   hep   TB lateral band walk  orange X 2    Incline s  :20 3x    TB row/  TB ext  BL X 20                          Manual Intervention (87290)  TIME             Man HS s bilat/ man gstro/ man ITB  4 min     ROM bilateral knees and hips  4 min            NMR re-education (91601) resistance Sets/time Reps CUES NEEDED   Supine march  X 20  esau    bridges  X 15     SB DKC   X 20     SB LTR  X10             Therapeutic Activity (36415)  Sets/time                                          Modalities:    No modalities applied this session    Education/Home Exercise Program: Patient HEP program created electronically.  Refer to [employer] access code:    Access Code: [email or web address]  URL: [email or web address]  Date: 10/04/2024  Prepared by: Emma Kendall    Exercises  - Hooklying Transversus Abdominis Palpation  - 2 x daily - 7 x weekly - 1 sets - 10 reps - 5 hold  - Hooklying Clamshell with Resistance  - 2 x daily - 7 x weekly - 1 sets - 10 reps - 5 hold  - Supine Hip Adduction Isometric with Ball  - 2 x daily - 7 x weekly - 1 sets - 10 reps - 5 hold  - Hooklying Lumbar Rotation  - 1 x

## 2024-10-28 ENCOUNTER — OFFICE VISIT (OUTPATIENT)
Dept: ORTHOPEDIC SURGERY | Age: 79
End: 2024-10-28
Payer: MEDICARE

## 2024-10-28 ENCOUNTER — TELEPHONE (OUTPATIENT)
Dept: ORTHOPEDIC SURGERY | Age: 79
End: 2024-10-28

## 2024-10-28 DIAGNOSIS — M79.18 MYOFASCIAL PAIN SYNDROME OF LUMBAR SPINE: ICD-10-CM

## 2024-10-28 DIAGNOSIS — M54.50 LUMBAR PAIN: ICD-10-CM

## 2024-10-28 DIAGNOSIS — M43.16 SPONDYLOLISTHESIS OF LUMBAR REGION: Primary | ICD-10-CM

## 2024-10-28 PROCEDURE — 99214 OFFICE O/P EST MOD 30 MIN: CPT | Performed by: FAMILY MEDICINE

## 2024-10-28 PROCEDURE — 1123F ACP DISCUSS/DSCN MKR DOCD: CPT | Performed by: FAMILY MEDICINE

## 2024-10-28 PROCEDURE — 1159F MED LIST DOCD IN RCRD: CPT | Performed by: FAMILY MEDICINE

## 2024-10-28 NOTE — TELEPHONE ENCOUNTER
Left voicemail for patient that their MRI has been authorized and that they can call and schedule scan at their convenience. Also told them that they can call and schedule a f/u with Dr. Sampson once they have MRI scheduled, leaving at least 2-3 days for our office to receive their results.

## 2024-10-29 NOTE — PROGRESS NOTES
Chief Complaint  Follow-up (FU LUMBAR )      FU ongoing mechanical left greater than right lower primarily axial back pain with underlying multilevel degenerative disc disease scoliosis facet arthropathy and spondylosis    History of Present Illness:  Meryl Bell is a 79 y.o. 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 Travon Rowe who is being seen today for evaluation of ongoing pain to her left and right lower lumbar spine.  We have seen her in the past for substantial knee arthritis ankle arthritis and other conditions.  We last saw her on 7/15/2024 we finished up viscosupplementation to her knees which are now doing reasonably well despite a fall in August.  Her greater issue today is of increasing persistent pain to her lower back which is fairly consistent in nature.  This has been an ongoing issue since roughly the end of August 2024.  She really does not have a history of substantial back pain although she has known that she has had scoliosis for the past 10 years.  Her pain symptoms are more in the left greater than right lower lumbar spine and are fairly constant and achy range between a 1-7 out of 10.  She did have a flare last week in which it worsened but there is no history of fall or trauma.  She feels very tight and stiff and typically will have to rest for these to improve.  She is not complaining of radicular symptoms or high-grade spinal stenosis symptoms.  She does have difficulty with prolonged sitting driving and positional changes as well as distance walking.  She is having some initially some night pain last week but this has resolved.  She has continued with her Celebrex which does seem to help her.  She has not had recent imaging is being seen today for orthopedic and sports consultation with plain film imaging.     We initially evaluated Meryl in the office on 9/23/2024 for evaluation of ongoing primarily

## 2024-11-13 ENCOUNTER — TELEPHONE (OUTPATIENT)
Dept: ORTHOPEDIC SURGERY | Age: 79
End: 2024-11-13

## 2024-11-13 NOTE — TELEPHONE ENCOUNTER
L/M for the patient informing her I was calling to check on the status of MRI results for her and her appointment with Becca Dolan PA-C. I informed her that Becca Dolan PA-C will no longer be going to the [place] office and as of December she will be at either the [place] office or the [place] office. They patient may call back to discuss rescheduling or other options at her convenience.

## 2024-11-21 RX ORDER — CELECOXIB 100 MG/1
100 CAPSULE ORAL DAILY
Qty: 30 CAPSULE | Refills: 0 | Status: SHIPPED | OUTPATIENT
Start: 2024-11-21

## 2024-11-21 NOTE — TELEPHONE ENCOUNTER
Refill request for CELECOXIB 100MG CAPSULES medication.     Name of Pharmacy- [facility]      Last visit - 8-1-2024     Pending visit - 2-4-2025    Last refill - 10-      Medication Contract signed -   Last Oarrs ran-         Additional Comments

## 2024-12-07 DIAGNOSIS — F32.5 MAJOR DEPRESSIVE DISORDER IN FULL REMISSION, UNSPECIFIED WHETHER RECURRENT (HCC): ICD-10-CM

## 2024-12-09 NOTE — TELEPHONE ENCOUNTER
Refill request for   FLUoxetine (PROZAC) 20 MG capsule    medication.     Name of Pharmacy- [facility]       Last visit - 8/1/24     Pending visit - 2/4/25    Last refill -5/16/24      Medication Contract signed -   Last Oarrs ran-         Additional Comments

## 2024-12-31 DIAGNOSIS — E78.5 HYPERLIPIDEMIA: ICD-10-CM

## 2024-12-31 RX ORDER — PRAVASTATIN SODIUM 20 MG
TABLET ORAL
Qty: 90 TABLET | Refills: 1 | Status: SHIPPED | OUTPATIENT
Start: 2024-12-31

## 2024-12-31 RX ORDER — BUPROPION HYDROCHLORIDE 150 MG/1
TABLET, EXTENDED RELEASE ORAL
Qty: 90 TABLET | Refills: 1 | Status: SHIPPED | OUTPATIENT
Start: 2024-12-31

## 2024-12-31 NOTE — TELEPHONE ENCOUNTER
Refill request for buPROPion (WELLBUTRIN SR) 150 MG extended release tablet ravastatin (PRAVACHOL) 20 MG tablet  medication.     [facility]      Last visit - 080124     Pending visit - 020425    Last refill -070824      Medication Contract signed -  Last Oarrs ran-         Additional Comments

## 2025-01-01 DIAGNOSIS — G47.00 INSOMNIA, UNSPECIFIED TYPE: ICD-10-CM

## 2025-01-02 RX ORDER — TRAZODONE HYDROCHLORIDE 50 MG/1
TABLET, FILM COATED ORAL
Qty: 135 TABLET | Refills: 1 | Status: SHIPPED | OUTPATIENT
Start: 2025-01-02

## 2025-01-02 NOTE — TELEPHONE ENCOUNTER
Refill request for TRAZODONE medication.     Name of Pharmacy-       Last visit - 8/1/24     Pending visit - 2/4/25    Last refill -9/18/24      Medication Contract signed -   Last Oarrs ran-         Additional Comments

## 2025-01-10 ENCOUNTER — TELEPHONE (OUTPATIENT)
Dept: ORTHOPEDIC SURGERY | Age: 80
End: 2025-01-10

## 2025-01-10 DIAGNOSIS — M25.562 ACUTE PAIN OF LEFT KNEE: ICD-10-CM

## 2025-01-10 DIAGNOSIS — M17.11 PRIMARY OSTEOARTHRITIS OF RIGHT KNEE: Primary | ICD-10-CM

## 2025-01-10 DIAGNOSIS — M25.561 ACUTE PAIN OF RIGHT KNEE: ICD-10-CM

## 2025-01-10 DIAGNOSIS — M17.12 PRIMARY OSTEOARTHRITIS OF LEFT KNEE: ICD-10-CM

## 2025-01-10 DIAGNOSIS — M22.42 CHONDROMALACIA PATELLAE OF LEFT KNEE: ICD-10-CM

## 2025-01-10 DIAGNOSIS — M22.41 CHONDROMALACIA PATELLAE OF RIGHT KNEE: ICD-10-CM

## 2025-01-10 NOTE — TELEPHONE ENCOUNTER
LVM for patient that Gelsyn-3 injections have been approved for BILATERAL knees. Told patient they could call central scheduling at [phone] at their convenience to schedule those appointments. Also told them if they had any problems or questions, they could call me directly at [phone]    Eligible 1/16/25 or after

## 2025-01-15 RX ORDER — CELECOXIB 100 MG/1
100 CAPSULE ORAL DAILY
Qty: 30 CAPSULE | Refills: 0 | Status: SHIPPED | OUTPATIENT
Start: 2025-01-15

## 2025-01-15 NOTE — TELEPHONE ENCOUNTER
Refill request for Celebrex medication.     Name of [facility]acy- [facility]      Last visit - 08/01/2024     Pending visit - 02/04/2025    Last refill -11/21/2024

## 2025-02-03 DIAGNOSIS — E78.2 MIXED HYPERLIPIDEMIA: ICD-10-CM

## 2025-02-03 LAB
ALBUMIN SERPL-MCNC: 4.2 G/DL (ref 3.4–5)
ALBUMIN/GLOB SERPL: 1.9 {RATIO} (ref 1.1–2.2)
ALP SERPL-CCNC: 58 U/L (ref 40–129)
ALT SERPL-CCNC: 10 U/L (ref 10–40)
ANION GAP SERPL CALCULATED.3IONS-SCNC: 6 MMOL/L (ref 3–16)
AST SERPL-CCNC: 19 U/L (ref 15–37)
BILIRUB SERPL-MCNC: 0.3 MG/DL (ref 0–1)
BUN SERPL-MCNC: 15 MG/DL (ref 7–20)
CALCIUM SERPL-MCNC: 9.5 MG/DL (ref 8.3–10.6)
CHLORIDE SERPL-SCNC: 105 MMOL/L (ref 99–110)
CHOLEST SERPL-MCNC: 172 MG/DL (ref 0–199)
CO2 SERPL-SCNC: 31 MMOL/L (ref 21–32)
CREAT SERPL-MCNC: 1.2 MG/DL (ref 0.6–1.2)
GFR SERPLBLD CREATININE-BSD FMLA CKD-EPI: 45 ML/MIN/{1.73_M2}
GLUCOSE SERPL-MCNC: 88 MG/DL (ref 70–99)
HDLC SERPL-MCNC: 56 MG/DL (ref 40–60)
LDLC SERPL CALC-MCNC: 95 MG/DL
POTASSIUM SERPL-SCNC: 4.5 MMOL/L (ref 3.5–5.1)
PROT SERPL-MCNC: 6.4 G/DL (ref 6.4–8.2)
SODIUM SERPL-SCNC: 142 MMOL/L (ref 136–145)
TRIGL SERPL-MCNC: 107 MG/DL (ref 0–150)
VLDLC SERPL CALC-MCNC: 21 MG/DL

## 2025-02-04 ENCOUNTER — OFFICE VISIT (OUTPATIENT)
Dept: INTERNAL MEDICINE CLINIC | Age: 80
End: 2025-02-04

## 2025-02-04 VITALS
DIASTOLIC BLOOD PRESSURE: 79 MMHG | OXYGEN SATURATION: 96 % | SYSTOLIC BLOOD PRESSURE: 130 MMHG | TEMPERATURE: 97.3 F | WEIGHT: 139 LBS | BODY MASS INDEX: 25.58 KG/M2 | HEART RATE: 91 BPM | HEIGHT: 62 IN

## 2025-02-04 DIAGNOSIS — N18.31 STAGE 3A CHRONIC KIDNEY DISEASE (HCC): Primary | ICD-10-CM

## 2025-02-04 DIAGNOSIS — G47.33 OSA (OBSTRUCTIVE SLEEP APNEA): ICD-10-CM

## 2025-02-04 DIAGNOSIS — K58.9 IRRITABLE BOWEL SYNDROME WITHOUT DIARRHEA: ICD-10-CM

## 2025-02-04 DIAGNOSIS — M17.0 PRIMARY OSTEOARTHRITIS OF BOTH KNEES: ICD-10-CM

## 2025-02-04 DIAGNOSIS — F32.5 MAJOR DEPRESSIVE DISORDER IN FULL REMISSION, UNSPECIFIED WHETHER RECURRENT (HCC): ICD-10-CM

## 2025-02-04 DIAGNOSIS — J98.4 RESTRICTIVE LUNG DISEASE: ICD-10-CM

## 2025-02-04 DIAGNOSIS — K21.00 GASTROESOPHAGEAL REFLUX DISEASE WITH ESOPHAGITIS WITHOUT HEMORRHAGE: ICD-10-CM

## 2025-02-04 DIAGNOSIS — J47.9 BRONCHIECTASIS WITHOUT COMPLICATION (HCC): ICD-10-CM

## 2025-02-04 DIAGNOSIS — Z23 NEED FOR TDAP VACCINATION: ICD-10-CM

## 2025-02-04 DIAGNOSIS — E78.2 MIXED HYPERLIPIDEMIA: ICD-10-CM

## 2025-02-04 SDOH — ECONOMIC STABILITY: FOOD INSECURITY: WITHIN THE PAST 12 MONTHS, YOU WORRIED THAT YOUR FOOD WOULD RUN OUT BEFORE YOU GOT MONEY TO BUY MORE.: NEVER TRUE

## 2025-02-04 SDOH — ECONOMIC STABILITY: FOOD INSECURITY: WITHIN THE PAST 12 MONTHS, THE FOOD YOU BOUGHT JUST DIDN'T LAST AND YOU DIDN'T HAVE MONEY TO GET MORE.: NEVER TRUE

## 2025-02-04 ASSESSMENT — PATIENT HEALTH QUESTIONNAIRE - PHQ9
8. MOVING OR SPEAKING SO SLOWLY THAT OTHER PEOPLE COULD HAVE NOTICED. OR THE OPPOSITE, BEING SO FIGETY OR RESTLESS THAT YOU HAVE BEEN MOVING AROUND A LOT MORE THAN USUAL: NOT AT ALL
3. TROUBLE FALLING OR STAYING ASLEEP: NOT AT ALL
SUM OF ALL RESPONSES TO PHQ QUESTIONS 1-9: 3
4. FEELING TIRED OR HAVING LITTLE ENERGY: NEARLY EVERY DAY
9. THOUGHTS THAT YOU WOULD BE BETTER OFF DEAD, OR OF HURTING YOURSELF: NOT AT ALL
SUM OF ALL RESPONSES TO PHQ9 QUESTIONS 1 & 2: 0
SUM OF ALL RESPONSES TO PHQ QUESTIONS 1-9: 3
SUM OF ALL RESPONSES TO PHQ QUESTIONS 1-9: 3
10. IF YOU CHECKED OFF ANY PROBLEMS, HOW DIFFICULT HAVE THESE PROBLEMS MADE IT FOR YOU TO DO YOUR WORK, TAKE CARE OF THINGS AT HOME, OR GET ALONG WITH OTHER PEOPLE: NOT DIFFICULT AT ALL
1. LITTLE INTEREST OR PLEASURE IN DOING THINGS: NOT AT ALL
7. TROUBLE CONCENTRATING ON THINGS, SUCH AS READING THE NEWSPAPER OR WATCHING TELEVISION: NOT AT ALL
6. FEELING BAD ABOUT YOURSELF - OR THAT YOU ARE A FAILURE OR HAVE LET YOURSELF OR YOUR FAMILY DOWN: NOT AT ALL
2. FEELING DOWN, DEPRESSED OR HOPELESS: NOT AT ALL
5. POOR APPETITE OR OVEREATING: NOT AT ALL
SUM OF ALL RESPONSES TO PHQ QUESTIONS 1-9: 3

## 2025-02-04 NOTE — PROGRESS NOTES
needed for Pain 100 g 3    Cetirizine HCl (ZYRTEC PO) Take 1 tablet by mouth daily.        omeprazole (PRILOSEC) 20 MG delayed release capsule Take 1 capsule by mouth 2 times daily (before meals) 180 capsule 1     No current facility-administered medications on file prior to visit.      Past Medical History:   Diagnosis Date    Anxiety     Arthritis     Bronchiectasis (HCC) 2014    Bronchoscopy.    Cancer (HCC)     skin    Cervical radiculopathy 05/2012    LT. c-5 c-6 MRI    Depression     Dupuytren's contracture     Dyspnea on exertion 02/22/2022    Echo pharmacological stress test: Normal dobutamine stress echocardiogram.    Dyspnea on exertion 02/22/2022    Dobutamine pharmacological stress test normal.    Fractures     Hyperlipidemia     Hyperlipidemia     IBS (irritable bowel syndrome)     Insomnia     Migraine     past hx    VIRGIL on CPAP 11/2022    Reflux     Restrictive lung disease 05/26/2022    Pulmonary function test: DX restrictive lung disease.  Seen by Dr. Jose Altman 11/2013    Left Face.    SOB (shortness of breath) 03/23/2017    GXT  Normal     Patient Active Problem List   Diagnosis    Insomnia    IBS (irritable bowel syndrome)    Arthritis    Dupuytren's contracture    Hyperlipidemia    Cervical radiculopathy    Depression    Shingles    Biliary calculus with cholecystitis    Primary localized osteoarthrosis, lower leg    Bronchiectasis (HCC)    Pelvic fracture (HCC)    Closed nondisplaced fracture of acetabulum (HCC)    Fracture of ramus of right pubis (HCC)    Closed fracture of sacrum (HCC)    Scoliosis    Gastroesophageal reflux disease with esophagitis    SOB (shortness of breath)    Renal insufficiency    Major depressive disorder in full remission (HCC)    Nausea and vomiting    SCC (squamous cell carcinoma), face    Hand pain, left    Left wrist pain    Primary osteoarthritis of first carpometacarpal joint of left hand    Primary osteoarthritis of left ankle    Sprain of left wrist

## 2025-03-12 ENCOUNTER — OFFICE VISIT (OUTPATIENT)
Dept: ORTHOPEDIC SURGERY | Age: 80
End: 2025-03-12

## 2025-03-12 DIAGNOSIS — M17.12 PRIMARY OSTEOARTHRITIS OF LEFT KNEE: ICD-10-CM

## 2025-03-12 DIAGNOSIS — M25.561 ACUTE PAIN OF RIGHT KNEE: ICD-10-CM

## 2025-03-12 DIAGNOSIS — M22.41 CHONDROMALACIA PATELLAE OF RIGHT KNEE: ICD-10-CM

## 2025-03-12 DIAGNOSIS — M25.562 ACUTE PAIN OF LEFT KNEE: ICD-10-CM

## 2025-03-12 DIAGNOSIS — M17.11 PRIMARY OSTEOARTHRITIS OF RIGHT KNEE: Primary | ICD-10-CM

## 2025-03-12 DIAGNOSIS — M22.42 CHONDROMALACIA PATELLAE OF LEFT KNEE: ICD-10-CM

## 2025-03-12 RX ORDER — METHYLPREDNISOLONE 4 MG/1
TABLET ORAL
Qty: 1 KIT | Refills: 0 | Status: SHIPPED | OUTPATIENT
Start: 2025-03-12

## 2025-03-12 NOTE — PROGRESS NOTES
Chief Complaint  Knee Pain (FUBIL KNEES )    FU worsening right knee pain with swelling and occasional pseudo buckling status post remote arthroscopic intervention with meniscectomy 10+ years ago    History of left knee pain with with underlying significant left knee osteoarthritis status post completion of GELSYN-3 10/2/2023 with recent worsening of left knee pain    History of improved right ankle pain with history of right ankle osteoarthritis    History of fall 9/5/2021 with MRI documented left wrist capsulitis with spraining and first CMC osteoarthritis    History of Present Illness:  Meryl Bell is a 79 y.o. 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  She does have a history of significant patellofemoral arthropathy and knee osteoarthritis to her contralateral right knee treated with a one-time steroid injection by Dr. Clint Cheek in 2015.  She states that a week ago on about 1/4/2020 when she was kneeling down vacuuming some carpeted steps at home when she felt a pop primarily to the anterior portion of her left knee prompting today's visit.  She has had a fairly consistent achy 5-6 out of 10 pain with her knee with pain with positional changes distance walking crossing her legs walking on uneven surfaces and going up and down stairs.  She has taken some Excedrin ice and Aspercreme but this has not improved her symptoms.  Initially she was having some night pain but this has improved.  No active locking or catching.  Her symptoms have not substantially improved over the last week prompting today's visit and she is being seen today for orthopedic and sports consultation with updated imaging.  She is not having any instability or buckling symptoms.    Meryl was last seen in the office on 1/11/2021 and was diagnosed with significant bone-on-bone changes to the medial

## 2025-03-19 ENCOUNTER — OFFICE VISIT (OUTPATIENT)
Dept: ORTHOPEDIC SURGERY | Age: 80
End: 2025-03-19

## 2025-03-19 VITALS — BODY MASS INDEX: 25.58 KG/M2 | HEIGHT: 62 IN | WEIGHT: 139 LBS

## 2025-03-19 DIAGNOSIS — M19.071 LOCALIZED OSTEOARTHRITIS OF RIGHT ANKLE: ICD-10-CM

## 2025-03-19 DIAGNOSIS — M21.42 PES PLANUS OF BOTH FEET: ICD-10-CM

## 2025-03-19 DIAGNOSIS — M22.41 CHONDROMALACIA PATELLAE OF RIGHT KNEE: ICD-10-CM

## 2025-03-19 DIAGNOSIS — M21.41 PES PLANUS OF BOTH FEET: ICD-10-CM

## 2025-03-19 DIAGNOSIS — M17.11 PRIMARY OSTEOARTHRITIS OF RIGHT KNEE: Primary | ICD-10-CM

## 2025-03-19 DIAGNOSIS — M22.42 CHONDROMALACIA PATELLAE OF LEFT KNEE: ICD-10-CM

## 2025-03-19 DIAGNOSIS — M17.12 PRIMARY OSTEOARTHRITIS OF LEFT KNEE: ICD-10-CM

## 2025-03-19 DIAGNOSIS — M25.562 ACUTE PAIN OF LEFT KNEE: ICD-10-CM

## 2025-03-19 DIAGNOSIS — G89.29 CHRONIC PAIN OF RIGHT ANKLE: ICD-10-CM

## 2025-03-19 DIAGNOSIS — M25.561 ACUTE PAIN OF RIGHT KNEE: ICD-10-CM

## 2025-03-19 DIAGNOSIS — M25.571 CHRONIC PAIN OF RIGHT ANKLE: ICD-10-CM

## 2025-03-19 NOTE — PROGRESS NOTES
Office Procedures:  Orders Placed This Encounter   Procedures    DRAIN/INJECT LARGE JOINT/BURSA       Treatment Plan:  Treatment options were discussed with Meryl Bell.  We did once again review her updated plain films and exam findings.  Does have quite prominent anterior medial compartment osteoarthritis.  She is known to have fairly advanced medial compartment osteoarthritis and completed viscosupplementation with Gelsyn-3 on 10/2/2023 to her left knee.  She was seen a couple weeks ago for worsening of her right knee symptoms which have improved also does have degeneration on that side.  She is hoping to put off knee arthroplasty as long as she can.  After discussion of pros and cons of viscosupplementation, she did receive her second injection of Gelsyn-3 to her knees bilaterally.  This was performed using the standard prefilled 2 cc syringe.   She may utilize her wraparound knee brace and is familiar with her patella protection program.  She did finish up her Medrol pack which did help her and will continue with her Voltaren gel 4 g 4 times daily as well as episodic use of her Celebrex.  She notes that when she does take the Celebrex which she does infrequently, this also helps her ankle arthritis and wrist and hand arthritic symptoms.  Will see her back next week to finish up her Gelsyn-3 injection series which has helped her in the past I would like to avoid knee arthroplasty if at all possible.  She will continue with her exercise program.  Use of her knee brace.  She will contact us in the interim with questions or concerns     This dictation was performed with a verbal recognition program (DRAGON) and it was checked for errors. It is possible that there are still dictated errors within this office note. If so, please bring any errors to my attention for an addendum. All efforts were made to ensure that this office note is accurate.

## 2025-03-26 ENCOUNTER — OFFICE VISIT (OUTPATIENT)
Dept: ORTHOPEDIC SURGERY | Age: 80
End: 2025-03-26
Payer: MEDICARE

## 2025-03-26 DIAGNOSIS — M17.12 PRIMARY OSTEOARTHRITIS OF LEFT KNEE: ICD-10-CM

## 2025-03-26 DIAGNOSIS — M17.11 PRIMARY OSTEOARTHRITIS OF RIGHT KNEE: Primary | ICD-10-CM

## 2025-03-26 DIAGNOSIS — M25.571 CHRONIC PAIN OF RIGHT ANKLE: ICD-10-CM

## 2025-03-26 DIAGNOSIS — M25.561 ACUTE PAIN OF RIGHT KNEE: ICD-10-CM

## 2025-03-26 DIAGNOSIS — M22.41 CHONDROMALACIA PATELLAE OF RIGHT KNEE: ICD-10-CM

## 2025-03-26 DIAGNOSIS — G89.29 CHRONIC PAIN OF RIGHT ANKLE: ICD-10-CM

## 2025-03-26 DIAGNOSIS — M22.42 CHONDROMALACIA PATELLAE OF LEFT KNEE: ICD-10-CM

## 2025-03-26 DIAGNOSIS — M19.071 LOCALIZED OSTEOARTHRITIS OF RIGHT ANKLE: ICD-10-CM

## 2025-03-26 DIAGNOSIS — M25.562 ACUTE PAIN OF LEFT KNEE: ICD-10-CM

## 2025-03-26 PROCEDURE — 1123F ACP DISCUSS/DSCN MKR DOCD: CPT | Performed by: FAMILY MEDICINE

## 2025-03-26 PROCEDURE — 20610 DRAIN/INJ JOINT/BURSA W/O US: CPT | Performed by: FAMILY MEDICINE

## 2025-03-26 PROCEDURE — 99213 OFFICE O/P EST LOW 20 MIN: CPT | Performed by: FAMILY MEDICINE

## 2025-03-26 RX ORDER — METHYLPREDNISOLONE 4 MG/1
TABLET ORAL
Qty: 21 KIT | Refills: 0 | Status: SHIPPED | OUTPATIENT
Start: 2025-03-26

## 2025-03-26 NOTE — PROGRESS NOTES
Chief Complaint  Knee Pain (GELSYN-3 #3 LAURA KNEES )    FU worsening right knee pain with swelling and occasional pseudo buckling status post remote arthroscopic intervention with meniscectomy 10+ years ago with continuation of Gelsyn-3 injection series bilateral knee    History of left knee pain with with underlying significant left knee osteoarthritis with continued bilateral knee Gelsyn-3 injection series     History of episodic right ankle pain with history of right ankle osteoarthritis    History of fall 9/5/2021 with MRI documented left wrist capsulitis with spraining and first CMC osteoarthritis    History of Present Illness:  Meryl Bell is a 79 y.o. 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  She does have a history of significant patellofemoral arthropathy and knee osteoarthritis to her contralateral right knee treated with a one-time steroid injection by Dr. Clint Cheek in 2015.  She states that a week ago on about 1/4/2020 when she was kneeling down vacuuming some carpeted steps at home when she felt a pop primarily to the anterior portion of her left knee prompting today's visit.  She has had a fairly consistent achy 5-6 out of 10 pain with her knee with pain with positional changes distance walking crossing her legs walking on uneven surfaces and going up and down stairs.  She has taken some Excedrin ice and Aspercreme but this has not improved her symptoms.  Initially she was having some night pain but this has improved.  No active locking or catching.  Her symptoms have not substantially improved over the last week prompting today's visit and she is being seen today for orthopedic and sports consultation with updated imaging.  She is not having any instability or buckling symptoms.    Meryl was last seen in the office on 1/11/2021 and was diagnosed with

## 2025-05-07 ENCOUNTER — OFFICE VISIT (OUTPATIENT)
Dept: ORTHOPEDIC SURGERY | Age: 80
End: 2025-05-07
Payer: MEDICARE

## 2025-05-07 VITALS — HEIGHT: 62 IN | BODY MASS INDEX: 25.58 KG/M2 | WEIGHT: 139 LBS

## 2025-05-07 DIAGNOSIS — M47.812 CERVICAL SPONDYLOSIS: ICD-10-CM

## 2025-05-07 DIAGNOSIS — M25.511 RIGHT SHOULDER PAIN, UNSPECIFIED CHRONICITY: Primary | ICD-10-CM

## 2025-05-07 DIAGNOSIS — M25.511 RIGHT SHOULDER PAIN, UNSPECIFIED CHRONICITY: ICD-10-CM

## 2025-05-07 DIAGNOSIS — M67.911 TENDINOPATHY OF RIGHT SHOULDER: ICD-10-CM

## 2025-05-07 DIAGNOSIS — M54.2 CERVICAL PAIN: ICD-10-CM

## 2025-05-07 DIAGNOSIS — M54.2 NECK PAIN: ICD-10-CM

## 2025-05-07 DIAGNOSIS — M50.30 DDD (DEGENERATIVE DISC DISEASE), CERVICAL: ICD-10-CM

## 2025-05-07 PROCEDURE — 99214 OFFICE O/P EST MOD 30 MIN: CPT | Performed by: FAMILY MEDICINE

## 2025-05-07 PROCEDURE — 1123F ACP DISCUSS/DSCN MKR DOCD: CPT | Performed by: FAMILY MEDICINE

## 2025-05-07 RX ORDER — CELECOXIB 200 MG/1
200 CAPSULE ORAL DAILY
Qty: 30 CAPSULE | Refills: 1 | Status: SHIPPED | OUTPATIENT
Start: 2025-05-07 | End: 2025-05-07 | Stop reason: CLARIF

## 2025-05-07 RX ORDER — METHYLPREDNISOLONE 4 MG/1
TABLET ORAL
Qty: 1 KIT | Refills: 0 | Status: SHIPPED | OUTPATIENT
Start: 2025-05-07

## 2025-05-07 NOTE — PROGRESS NOTES
Chief Complaint  Neck Pain (OPNP- BL NECK, RT SHOULDER)      Initial consultation newer onset cervical pain with known history of cervical degenerative's disease spondylolysis with right shoulder and arm pain    History of Present Illness:  Meryl Bell is a 79 y.o. 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 Travon Rowe who is being seen today for evaluation of ongoing pain to her right greater than left cervical spine or right shoulder and arm.  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  She did not follow through with this as her lumbar symptoms did improve with physical therapy and she has continued with her Celebrex.  She presents today for evaluation of newer onset pain to the right side of her cervical spine shoulder and arm which has been ongoing since roughly the end of March 2025.  History of Present Illness  The patient presents for an initial evaluation of right shoulder, neck, and arm pain.    She reports experiencing discomfort in her right shoulder, neck, and arm for the past 4 to 6 weeks. She was seen last year for mechanical back pain with radiculopathy and was referred to Becca Dolan, but it does not sound as if this occurred.  She states that since roughly the end of March 2025 she did have the insidious onset of pain to the right side of her cervical spine shoulder and arm.  This started shortly after undergoing some painting projects at home in which she was painting walls and ceilings with her cervical spine in an extended position.  No history of fall or trauma.  She is complaining of a fairly consistent aching type pain with motion deficits and pain with rotation at about a 4-5 out of 10 achy but occasionally sharp in nature and has had some discomfort to the posterior aspect

## 2025-05-08 RX ORDER — CELECOXIB 200 MG/1
200 CAPSULE ORAL DAILY
Qty: 90 CAPSULE | Refills: 2 | Status: SHIPPED | OUTPATIENT
Start: 2025-05-08

## 2025-05-09 ENCOUNTER — HOSPITAL ENCOUNTER (OUTPATIENT)
Dept: PHYSICAL THERAPY | Age: 80
Setting detail: THERAPIES SERIES
Discharge: HOME | End: 2025-05-09
Attending: FAMILY MEDICINE
Payer: MEDICARE

## 2025-05-09 DIAGNOSIS — M54.2 CERVICAL PAIN (NECK): Primary | ICD-10-CM

## 2025-05-09 PROCEDURE — 97140 MANUAL THERAPY 1/> REGIONS: CPT | Performed by: PHYSICAL THERAPIST

## 2025-05-09 PROCEDURE — 97110 THERAPEUTIC EXERCISES: CPT | Performed by: PHYSICAL THERAPIST

## 2025-05-09 PROCEDURE — 97161 PT EVAL LOW COMPLEX 20 MIN: CPT | Performed by: PHYSICAL THERAPIST

## 2025-05-09 NOTE — PLAN OF CARE
[facility] - Outpatient Rehabilitation and Therapy: [address], [place] [address] office: [phone] fax: [phone]     Physical Therapy Initial Evaluation Certification      Dear Dameon Sampson MD ,    We had the pleasure of evaluating the following patient for physical therapy services at [facility] Out[facility] Physical Therapy.  A summary of our findings can be found in the initial assessment below.  This includes our plan of care.  If you have any questions or concerns regarding these findings, please do not hesitate to contact me at the office phone number listed above.  Thank you for the referral.     Physician Signature:_______________________________Date:__________________  By signing above (or electronic signature), therapist’s plan is approved by physician       Physical Therapy: TREATMENT/PROGRESS NOTE   Patient: Meryl Bell (79 y.o. female)   Examination Date: 2025   :  1945 MRN: [id number]   Visit #: 1   Insurance Allowable Auth Needed   $20 copay/   needs optum [x]Yes    []No    Insurance: Payor: [facility] MEDICARE / Plan: [facility] MEDICARE ADVANTAGE / Product Type: *No Product type* /   Insurance ID: [id number] - (Medicare Managed)  Secondary Insurance (if applicable):    Treatment Diagnosis:     ICD-10-CM    1. Cervical pain (neck)  M54.2          Medical Diagnosis:  Right shoulder pain, unspecified chronicity [M25.511]  Neck pain [M54.2]  Cervical pain [M54.2]  DDD (degenerative disc disease), cervical [M50.30]  Cervical spondylosis [M47.812]  Tendinopathy of right shoulder [M67.911]   Referring Physician: Dameon Sampson MD  PCP: Drew De Jesus MD     Plan of care signed (Y/N):     Date of Patient follow up with Physician:      Plan of Care Report: EVAL today  POC update due: (10 visits /OR AUTH LIMITS, whichever is less)  2025                                             Medical  18-Oct-2017 12:47

## 2025-05-13 ENCOUNTER — HOSPITAL ENCOUNTER (OUTPATIENT)
Dept: PHYSICAL THERAPY | Age: 80
Setting detail: THERAPIES SERIES
Discharge: HOME | End: 2025-05-13
Attending: FAMILY MEDICINE
Payer: MEDICARE

## 2025-05-13 PROCEDURE — 97140 MANUAL THERAPY 1/> REGIONS: CPT | Performed by: PHYSICAL THERAPIST

## 2025-05-13 PROCEDURE — 97110 THERAPEUTIC EXERCISES: CPT | Performed by: PHYSICAL THERAPIST

## 2025-05-13 NOTE — FLOWSHEET NOTE
(Integumentary, CardioPulmonary, Neurological) by intake and observation. Intake form is in the medical record. Patient has been instructed to contact their primary care physician regarding ROS issues if not already being addressed at this time.    [x] Patient history, allergies, meds reviewed. Medical chart reviewed. See intake form.     OBJECTIVE EXAMINATION     5/9/25  ROM/Strength: (Blank cells denote NT)    Mvmt (norm) AROM L AROM R Notes PROM L PROM R Notes     CERVICAL Flex (60) 45       Ext (70) 26       SB(45) 24 20        Rotation (80) 50 51           SHOULDER Flexion (180) 150 150        Abduction (180) 150 150        ER -0          ER -90 (90)          IR -0          IR -90 (70)           ELBOW Flex/biceps (140) WNL WNL        Ext/triceps (0) WNL WNL        Pronation (80)          Supination (80)               MMT L MMT R Notes       SHOULDER Flexion 5 5     Abduction 5 5     ER -0       ER -90       IR -0       IR -90        ELBOW Flex/biceps 5 5     Ext/triceps 5 5     Pronation       supination          WRIST Flexion 5 5     Extension       RD       UD               Exercises/Interventions     Therapeutic Ex (19249)  resistance Sets/time Reps Notes/Cues/Progressions          Cervical chin tuck supine  :05 X 10 hep   Supine rotation with chin tuck    X 10 hep   Supine pec s  :20 3x hep   No $   X 10 hep   Seated chin tuck  with SBS  :05 X 10 hep   Mod UT s   bilat  :20 2x    Mod Lev s   just to the left  :20 2x      TB row  GR  X 20     Manual Intervention (44387)  TIME     Man traction/   STM / SOR  14 min     Lateral glide to right  3 min     PROM  3 min                   NMR re-education (33742) resistance Sets/time Reps CUES NEEDED                                      Therapeutic Activity (03871)  Sets/time     Sleep posture  X                                    Modalities:    No modalities applied this session    Education/Home Exercise Program: Patient HEP program created electronically.

## 2025-05-16 ENCOUNTER — HOSPITAL ENCOUNTER (OUTPATIENT)
Dept: PHYSICAL THERAPY | Age: 80
Setting detail: THERAPIES SERIES
Discharge: HOME | End: 2025-05-16
Attending: FAMILY MEDICINE
Payer: MEDICARE

## 2025-05-16 PROCEDURE — 97110 THERAPEUTIC EXERCISES: CPT | Performed by: PHYSICAL THERAPIST

## 2025-05-16 PROCEDURE — 97140 MANUAL THERAPY 1/> REGIONS: CPT | Performed by: PHYSICAL THERAPIST

## 2025-05-16 NOTE — FLOWSHEET NOTE
[facility] - Outpatient Rehabilitation and Therapy: [address], [place] [address] office: [phone] fax: [phone]     Physical Therapy Initial Evaluation Certification      Dear Dameon Sampson MD ,    We had the pleasure of evaluating the following patient for physical therapy services at [facility] Outpatient Physical Therapy.  A summary of our findings can be found in the initial assessment below.  This includes our plan of care.  If you have any questions or concerns regarding these findings, please do not hesitate to contact me at the office phone number listed above.  Thank you for the referral.     Physician Signature:_______________________________Date:__________________  By signing above (or electronic signature), therapist’s plan is approved by physician       Physical Therapy: TREATMENT/PROGRESS NOTE   Patient: Meryl Bell (79 y.o. female)   Examination Date: 2025   :  1945 MRN: [id number]   Visit #: 3   Insurance Allowable Auth Needed   $20 copay/      16 until 25 [x]Yes    []No    Insurance: Payor: [facility] MEDICARE / Plan: [facility] MEDICARE ADVANTAGE / Product Type: *No Product type* /   Insurance ID: [id number] - (Medicare Managed)  Secondary Insurance (if applicable):    Treatment Diagnosis:     ICD-10-CM    1. Cervical pain (neck)  M54.2          Medical Diagnosis:  Right shoulder pain, unspecified chronicity [M25.511]  Neck pain [M54.2]  Cervical pain [M54.2]  DDD (degenerative disc disease), cervical [M50.30]  Cervical spondylosis [M47.812]  Tendinopathy of right shoulder [M67.911]   Referring Physician: Dameon Sampson MD  PCP: Drew De Jesus MD     Plan of care signed (Y/N):     Date of Patient follow up with Physician:      Plan of Care Report: NO  POC update due: (10 visits /OR AUTH LIMITS, whichever is less)  2025                                             Medical History:  Comorbidities:  Osteoarthritis  Anxiety  Depression  Relevant

## 2025-05-20 ENCOUNTER — HOSPITAL ENCOUNTER (OUTPATIENT)
Dept: PHYSICAL THERAPY | Age: 80
Setting detail: THERAPIES SERIES
Discharge: HOME | End: 2025-05-20
Attending: FAMILY MEDICINE
Payer: MEDICARE

## 2025-05-20 PROCEDURE — 97140 MANUAL THERAPY 1/> REGIONS: CPT | Performed by: PHYSICAL THERAPIST

## 2025-05-20 PROCEDURE — 97110 THERAPEUTIC EXERCISES: CPT | Performed by: PHYSICAL THERAPIST

## 2025-05-20 NOTE — FLOWSHEET NOTE
[facility] - Outpatient Rehabilitation and Therapy: [address], [place] [address] office: [phone] fax: [phone]     Physical Therapy Initial Evaluation Certification      Dear Dameon Sampson MD ,    We had the pleasure of evaluating the following patient for physical therapy services at [facility] Outpatient Physical Therapy.  A summary of our findings can be found in the initial assessment below.  This includes our plan of care.  If you have any questions or concerns regarding these findings, please do not hesitate to contact me at the office phone number listed above.  Thank you for the referral.     Physician Signature:_______________________________Date:__________________  By signing above (or electronic signature), therapist’s plan is approved by physician       Physical Therapy: TREATMENT/PROGRESS NOTE   Patient: Meryl Bell (79 y.o. female)   Examination Date: 2025   :  1945 MRN: [id number]   Visit #: 4   Insurance Allowable Auth Needed   $20 copay/      16 until 25 [x]Yes    []No    Insurance: Payor: [facility] MEDICARE / Plan: [facility] MEDICARE ADVANTAGE / Product Type: *No Product type* /   Insurance ID: [id number] - (Medicare Managed)  Secondary Insurance (if applicable):    Treatment Diagnosis:     ICD-10-CM    1. Cervical pain (neck)  M54.2          Medical Diagnosis:  Right shoulder pain, unspecified chronicity [M25.511]  Neck pain [M54.2]  Cervical pain [M54.2]  DDD (degenerative disc disease), cervical [M50.30]  Cervical spondylosis [M47.812]  Tendinopathy of right shoulder [M67.911]   Referring Physician: Dameon Sampson MD  PCP: Drew De Jesus MD     Plan of care signed (Y/N):     Date of Patient follow up with Physician:      Plan of Care Report: NO  POC update due: (10 visits /OR AUTH LIMITS, whichever is less)  2025                                             Medical History:  Comorbidities:  Osteoarthritis  Anxiety  Depression  Relevant

## 2025-05-23 ENCOUNTER — APPOINTMENT (OUTPATIENT)
Dept: PHYSICAL THERAPY | Age: 80
End: 2025-05-23
Attending: FAMILY MEDICINE
Payer: MEDICARE

## 2025-05-28 ENCOUNTER — HOSPITAL ENCOUNTER (OUTPATIENT)
Dept: PHYSICAL THERAPY | Age: 80
Setting detail: THERAPIES SERIES
Discharge: HOME | End: 2025-05-28
Attending: FAMILY MEDICINE
Payer: MEDICARE

## 2025-05-28 PROCEDURE — 97110 THERAPEUTIC EXERCISES: CPT | Performed by: PHYSICAL THERAPIST

## 2025-05-28 PROCEDURE — 97140 MANUAL THERAPY 1/> REGIONS: CPT | Performed by: PHYSICAL THERAPIST

## 2025-05-28 NOTE — FLOWSHEET NOTE
Code: [email or web address]  URL: [email or web address]  Date: 05/16/2025  Prepared by: Emma Kendall    Exercises  - Supine Chin Tuck  - 2 x daily - 7 x weekly - 1 sets - 10 reps - 5 hold  - Supine Cervical Rotation AROM on Pillow  - 2 x daily - 7 x weekly - 1 sets - 10 reps - 5 hold  - Supine Pectoralis Stretch  - 2 x daily - 7 x weekly - 1 sets - 3 reps - 20 hold  - Seated Scapular Retraction  - 2 x daily - 7 x weekly - 1 sets - 10 reps - 5 hold  - Seated Cervical Retraction  - 2 x daily - 7 x weekly - 1 sets - 10 reps - 5 hold  - Shoulder External Rotation and Scapular Retraction  - 2 x daily - 7 x weekly - 1 sets - 10 reps - 5 hold  - Shoulder Flexion Wall Walk  - 1 x daily - 7 x weekly - 1 sets - 10 reps - 2 hold  - Wall Push Up  - 1 x daily - 7 x weekly - 2 sets - 10 reps - 2 hold  - Doorway Pec Stretch at 60 Elevation  - 1 x daily - 7 x weekly - 1 sets - 3 reps - 20 hold    Patient Education  - Sleep Positions    ASSESSMENT     Today's Assessment: During therapy this date, patient required modification of technique, progression of exercises and program, and manual interventions for improving proper muscle recruitment and activation/motor control patterns, modulating pain, promoting relaxation, and increasing ROM.Patient will continue to benefit from ongoing evaluation and advanced clinical decision from a Physical Therapist to address and improve pain control, ROM, and neuromuscular control to safely return to PLOF without symptoms or restrictions.  Added more exercises greater than 90 degrees of flex while supine.  Pt did feel challenged by workout, but she had more c/o fatigue, than pain.  Pt was given info on TENS units.  Pt reports that she is likely to purchase one before her next visit, so we can help her set it.       Medical Necessity Documentation:  I certify that this patient meets the below criteria necessary for medical necessity for care and/or justification of therapy services:  The patient has a

## 2025-06-04 ENCOUNTER — OFFICE VISIT (OUTPATIENT)
Dept: ORTHOPEDIC SURGERY | Age: 80
End: 2025-06-04
Payer: MEDICARE

## 2025-06-04 VITALS — HEIGHT: 62 IN | BODY MASS INDEX: 25.58 KG/M2 | WEIGHT: 139 LBS

## 2025-06-04 DIAGNOSIS — M54.2 NECK PAIN: Primary | ICD-10-CM

## 2025-06-04 DIAGNOSIS — M25.511 BILATERAL SHOULDER PAIN, UNSPECIFIED CHRONICITY: ICD-10-CM

## 2025-06-04 DIAGNOSIS — M25.511 RIGHT SHOULDER PAIN, UNSPECIFIED CHRONICITY: ICD-10-CM

## 2025-06-04 DIAGNOSIS — M25.512 BILATERAL SHOULDER PAIN, UNSPECIFIED CHRONICITY: ICD-10-CM

## 2025-06-04 DIAGNOSIS — M50.30 DDD (DEGENERATIVE DISC DISEASE), CERVICAL: ICD-10-CM

## 2025-06-04 DIAGNOSIS — M54.2 CERVICAL PAIN: ICD-10-CM

## 2025-06-04 DIAGNOSIS — M47.812 CERVICAL SPONDYLOSIS: ICD-10-CM

## 2025-06-04 DIAGNOSIS — M67.911 TENDINOPATHY OF RIGHT SHOULDER: ICD-10-CM

## 2025-06-04 PROCEDURE — 1123F ACP DISCUSS/DSCN MKR DOCD: CPT | Performed by: FAMILY MEDICINE

## 2025-06-04 PROCEDURE — 1159F MED LIST DOCD IN RCRD: CPT | Performed by: FAMILY MEDICINE

## 2025-06-04 PROCEDURE — 99213 OFFICE O/P EST LOW 20 MIN: CPT | Performed by: FAMILY MEDICINE

## 2025-06-04 RX ORDER — CELECOXIB 200 MG/1
200 CAPSULE ORAL DAILY
Qty: 30 CAPSULE | Refills: 3 | Status: CANCELLED | OUTPATIENT
Start: 2025-06-04

## 2025-06-04 NOTE — PROGRESS NOTES
listed above  MRI cervical spine without contrast  Order: [id number]   Status: Final result       Next appt: 05/09/2025 at 01:00 PM in Physical Therapy (Emma Kendall PT)    Test Result Released: No    0 Result Notes  Details    Reading Physician Reading Date Result Priority   Isaiah Guo MD  [phone]     5/11/2012 Routine     Narrative     CERVICAL SPINE MRI WITHOUT CONTRAST 5/10/2012-     HISTORY- Severe neck pain, left greater than right arm pain.     COMPARISON- Plain films 4/30/2012.     FINDINGS- Study is mildly limited by motion. No gross marrow  edema.     C2-C3 shows a central disc protrusion causing moderate thecal sac  effacement.     C3-C4 shows mild anterolisthesis which appears to relate to severe  facet arthropathy. A broad disc protrusion causes subtle central  and right paracentral cord flattening. Each neural foramen appears  moderately narrowed by uncovertebral spur.     C4-C5 shows a broad mixed protrusion to abut the cord. There is  moderate right foraminal stenosis.     C5-C6 shows mild retrolisthesis. A left paracentral and foraminal  mixed protrusion causes mild cord flattening and severely narrows  the left neural foramen. There is moderate narrowing of the right.     C6-C7 shows a spondylotic protrusion to abut the cord. Moderate  foraminal narrowing is seen bilaterally.     C7-T1 shows mild anterolisthesis. There is a broad disc protrusion  which abuts the cord.     T1-T2 shows and moderate disc bulge.     T2-T3 shows a broad disc protrusion causing subtle left  paracentral cord flattening.        IMPRESSION- There are a few levels with neural effacement as  described above, particularly C5-C6 toward the left. Results will  need to be correlated clinically.        Dictated on- 05/11/2012 10-29-39            Assessment: #1.  8-10 weeks status post partially improved aggravation cervical degenerative disc disease with multilevel cervical spondylolysis and suspected low-grade right

## 2025-06-10 ENCOUNTER — HOSPITAL ENCOUNTER (OUTPATIENT)
Dept: PHYSICAL THERAPY | Age: 80
Setting detail: THERAPIES SERIES
Discharge: HOME | End: 2025-06-10
Attending: FAMILY MEDICINE
Payer: MEDICARE

## 2025-06-10 PROCEDURE — 97140 MANUAL THERAPY 1/> REGIONS: CPT | Performed by: PHYSICAL THERAPIST

## 2025-06-10 PROCEDURE — 97110 THERAPEUTIC EXERCISES: CPT | Performed by: PHYSICAL THERAPIST

## 2025-06-10 NOTE — FLOWSHEET NOTE
[facility] - [facility] and Therapy: [address], [place] [address] office: [phone] fax: [phone]     Physical Therapy Initial Evaluation Certification      Dear Dameon Sampson MD ,    We had the pleasure of evaluating the following patient for physical therapy services at [facility].  A summary of our findings can be found in the initial assessment below.  This includes our plan of care.  If you have any questions or concerns regarding these findings, please do not hesitate to contact me at the office phone number listed above.  Thank you for the referral.     Physician Signature:_______________________________Date:__________________  By signing above (or electronic signature), therapist’s plan is approved by physician       Physical Therapy: TREATMENT/PROGRESS NOTE   Patient: Meryl Bell (79 y.o. female)   Examination Date: 06/10/2025   :  1945 MRN: [id number]   Visit #: 6   Insurance Allowable Auth Needed   $20 copay/      16 until 25 [x]Yes    []No    Insurance: Payor: [facility] MEDICARE / Plan: [facility] MEDICARE ADVANTAGE / Product Type: *No Product type* /   Insurance ID: [id number] - (Medicare Managed)  Secondary Insurance (if applicable):    Treatment Diagnosis:     ICD-10-CM    1. Cervical pain (neck)  M54.2          Medical Diagnosis:  Right shoulder pain, unspecified chronicity [M25.511]  Neck pain [M54.2]  Cervical pain [M54.2]  DDD (degenerative disc disease), cervical [M50.30]  Cervical spondylosis [M47.812]  Tendinopathy of right shoulder [M67.911]   Referring Physician: Dameon Sampson MD  PCP: Drew De Jesus MD     Plan of care signed (Y/N):     Date of Patient follow up with Physician:      Plan of Care Report: NO  POC update due: (10 visits /OR AUTH LIMITS, whichever is less)  2025                                             Medical History:  Comorbidities:  Osteoarthritis  Anxiety  Depression  Relevant

## 2025-06-13 ENCOUNTER — APPOINTMENT (OUTPATIENT)
Dept: PHYSICAL THERAPY | Age: 80
End: 2025-06-13
Attending: FAMILY MEDICINE
Payer: MEDICARE

## 2025-06-17 ENCOUNTER — APPOINTMENT (OUTPATIENT)
Dept: PHYSICAL THERAPY | Age: 80
End: 2025-06-17
Attending: FAMILY MEDICINE
Payer: MEDICARE

## 2025-06-20 ENCOUNTER — APPOINTMENT (OUTPATIENT)
Dept: PHYSICAL THERAPY | Age: 80
End: 2025-06-20
Attending: FAMILY MEDICINE
Payer: MEDICARE

## 2025-06-24 DIAGNOSIS — G47.00 INSOMNIA, UNSPECIFIED TYPE: ICD-10-CM

## 2025-06-24 RX ORDER — TRAZODONE HYDROCHLORIDE 50 MG/1
TABLET ORAL
Qty: 135 TABLET | Refills: 1 | Status: SHIPPED | OUTPATIENT
Start: 2025-06-24

## 2025-06-24 NOTE — TELEPHONE ENCOUNTER
Refill request for Trazodone medication.     Name of Pharmacy- [facility]      Last visit - 02/04/2025     Pending visit - 08/04/2025    Last refill -01/02/2025

## 2025-07-02 DIAGNOSIS — M54.2 NECK PAIN: ICD-10-CM

## 2025-07-02 DIAGNOSIS — M67.911 TENDINOPATHY OF RIGHT SHOULDER: ICD-10-CM

## 2025-07-02 DIAGNOSIS — M54.2 CERVICAL PAIN: ICD-10-CM

## 2025-07-02 DIAGNOSIS — M47.812 CERVICAL SPONDYLOSIS: ICD-10-CM

## 2025-07-02 DIAGNOSIS — M25.511 RIGHT SHOULDER PAIN, UNSPECIFIED CHRONICITY: ICD-10-CM

## 2025-07-02 DIAGNOSIS — M50.30 DDD (DEGENERATIVE DISC DISEASE), CERVICAL: ICD-10-CM

## 2025-07-03 RX ORDER — CELECOXIB 200 MG/1
200 CAPSULE ORAL DAILY
Qty: 90 CAPSULE | Refills: 2 | Status: SHIPPED | OUTPATIENT
Start: 2025-07-03

## 2025-07-05 DIAGNOSIS — E78.5 HYPERLIPIDEMIA: ICD-10-CM

## 2025-07-07 RX ORDER — PRAVASTATIN SODIUM 20 MG
TABLET ORAL
Qty: 90 TABLET | Refills: 1 | Status: SHIPPED | OUTPATIENT
Start: 2025-07-07

## 2025-07-07 NOTE — TELEPHONE ENCOUNTER
Refill request for Pravastatin medication.     Name of Pharmacy- [facility]      Last visit - 02/04/2025     Pending visit - 08/04/2025    Last refill -12/31/2024

## 2025-08-01 DIAGNOSIS — E78.2 MIXED HYPERLIPIDEMIA: ICD-10-CM

## 2025-08-01 DIAGNOSIS — N18.31 STAGE 3A CHRONIC KIDNEY DISEASE (HCC): ICD-10-CM

## 2025-08-01 LAB
ALBUMIN SERPL-MCNC: 4.3 G/DL (ref 3.4–5)
ALBUMIN/GLOB SERPL: 2 {RATIO} (ref 1.1–2.2)
ALP SERPL-CCNC: 54 U/L (ref 40–129)
ALT SERPL-CCNC: 16 U/L (ref 10–40)
ANION GAP SERPL CALCULATED.3IONS-SCNC: 10 MMOL/L (ref 3–16)
AST SERPL-CCNC: 26 U/L (ref 15–37)
BASOPHILS # BLD: 0 K/UL (ref 0–0.2)
BASOPHILS NFR BLD: 0.7 %
BILIRUB SERPL-MCNC: 0.5 MG/DL (ref 0–1)
BUN SERPL-MCNC: 16 MG/DL (ref 7–20)
CALCIUM SERPL-MCNC: 9.9 MG/DL (ref 8.3–10.6)
CHLORIDE SERPL-SCNC: 104 MMOL/L (ref 99–110)
CHOLEST SERPL-MCNC: 160 MG/DL (ref 0–199)
CO2 SERPL-SCNC: 27 MMOL/L (ref 21–32)
CREAT SERPL-MCNC: 1.3 MG/DL (ref 0.6–1.2)
DEPRECATED RDW RBC AUTO: 13.7 % (ref 12.4–15.4)
EOSINOPHIL # BLD: 0.1 K/UL (ref 0–0.6)
EOSINOPHIL NFR BLD: 2.2 %
GFR SERPLBLD CREATININE-BSD FMLA CKD-EPI: 41 ML/MIN/{1.73_M2}
GLUCOSE SERPL-MCNC: 82 MG/DL (ref 70–99)
HCT VFR BLD AUTO: 39 % (ref 36–48)
HDLC SERPL-MCNC: 55 MG/DL (ref 40–60)
HGB BLD-MCNC: 12.9 G/DL (ref 12–16)
LDLC SERPL CALC-MCNC: 83 MG/DL
LYMPHOCYTES # BLD: 2.7 K/UL (ref 1–5.1)
LYMPHOCYTES NFR BLD: 46.2 %
MCH RBC QN AUTO: 29.5 PG (ref 26–34)
MCHC RBC AUTO-ENTMCNC: 33.2 G/DL (ref 31–36)
MCV RBC AUTO: 89 FL (ref 80–100)
MONOCYTES # BLD: 0.5 K/UL (ref 0–1.3)
MONOCYTES NFR BLD: 8.8 %
NEUTROPHILS # BLD: 2.5 K/UL (ref 1.7–7.7)
NEUTROPHILS NFR BLD: 42.1 %
PLATELET # BLD AUTO: 199 K/UL (ref 135–450)
PMV BLD AUTO: 8 FL (ref 5–10.5)
POTASSIUM SERPL-SCNC: 5 MMOL/L (ref 3.5–5.1)
PROT SERPL-MCNC: 6.5 G/DL (ref 6.4–8.2)
RBC # BLD AUTO: 4.38 M/UL (ref 4–5.2)
SODIUM SERPL-SCNC: 141 MMOL/L (ref 136–145)
TRIGL SERPL-MCNC: 111 MG/DL (ref 0–150)
VLDLC SERPL CALC-MCNC: 22 MG/DL
WBC # BLD AUTO: 6 K/UL (ref 4–11)

## 2025-08-04 ENCOUNTER — OFFICE VISIT (OUTPATIENT)
Dept: INTERNAL MEDICINE CLINIC | Age: 80
End: 2025-08-04

## 2025-08-04 VITALS
HEART RATE: 88 BPM | HEIGHT: 62 IN | SYSTOLIC BLOOD PRESSURE: 116 MMHG | WEIGHT: 145 LBS | DIASTOLIC BLOOD PRESSURE: 64 MMHG | BODY MASS INDEX: 26.68 KG/M2 | OXYGEN SATURATION: 94 %

## 2025-08-04 DIAGNOSIS — K21.00 GASTROESOPHAGEAL REFLUX DISEASE WITH ESOPHAGITIS WITHOUT HEMORRHAGE: ICD-10-CM

## 2025-08-04 DIAGNOSIS — G47.00 INSOMNIA, UNSPECIFIED TYPE: ICD-10-CM

## 2025-08-04 DIAGNOSIS — N18.31 STAGE 3A CHRONIC KIDNEY DISEASE (HCC): ICD-10-CM

## 2025-08-04 DIAGNOSIS — E78.2 MIXED HYPERLIPIDEMIA: ICD-10-CM

## 2025-08-04 DIAGNOSIS — G47.33 OSA (OBSTRUCTIVE SLEEP APNEA): ICD-10-CM

## 2025-08-04 DIAGNOSIS — Z00.00 MEDICARE ANNUAL WELLNESS VISIT, SUBSEQUENT: Primary | ICD-10-CM

## 2025-08-04 DIAGNOSIS — J47.9 BRONCHIECTASIS WITHOUT COMPLICATION (HCC): ICD-10-CM

## 2025-08-04 DIAGNOSIS — K58.9 IRRITABLE BOWEL SYNDROME WITHOUT DIARRHEA: ICD-10-CM

## 2025-08-04 DIAGNOSIS — J98.4 RESTRICTIVE LUNG DISEASE: ICD-10-CM

## 2025-08-04 DIAGNOSIS — F32.5 MAJOR DEPRESSIVE DISORDER IN FULL REMISSION, UNSPECIFIED WHETHER RECURRENT: ICD-10-CM

## 2025-08-04 ASSESSMENT — PATIENT HEALTH QUESTIONNAIRE - PHQ9
SUM OF ALL RESPONSES TO PHQ QUESTIONS 1-9: 1
6. FEELING BAD ABOUT YOURSELF - OR THAT YOU ARE A FAILURE OR HAVE LET YOURSELF OR YOUR FAMILY DOWN: NOT AT ALL
10. IF YOU CHECKED OFF ANY PROBLEMS, HOW DIFFICULT HAVE THESE PROBLEMS MADE IT FOR YOU TO DO YOUR WORK, TAKE CARE OF THINGS AT HOME, OR GET ALONG WITH OTHER PEOPLE: NOT DIFFICULT AT ALL
2. FEELING DOWN, DEPRESSED OR HOPELESS: NOT AT ALL
9. THOUGHTS THAT YOU WOULD BE BETTER OFF DEAD, OR OF HURTING YOURSELF: NOT AT ALL
4. FEELING TIRED OR HAVING LITTLE ENERGY: SEVERAL DAYS
SUM OF ALL RESPONSES TO PHQ QUESTIONS 1-9: 1
SUM OF ALL RESPONSES TO PHQ QUESTIONS 1-9: 1
3. TROUBLE FALLING OR STAYING ASLEEP: NOT AT ALL
7. TROUBLE CONCENTRATING ON THINGS, SUCH AS READING THE NEWSPAPER OR WATCHING TELEVISION: NOT AT ALL
5. POOR APPETITE OR OVEREATING: NOT AT ALL
1. LITTLE INTEREST OR PLEASURE IN DOING THINGS: NOT AT ALL
SUM OF ALL RESPONSES TO PHQ QUESTIONS 1-9: 1
8. MOVING OR SPEAKING SO SLOWLY THAT OTHER PEOPLE COULD HAVE NOTICED. OR THE OPPOSITE, BEING SO FIGETY OR RESTLESS THAT YOU HAVE BEEN MOVING AROUND A LOT MORE THAN USUAL: NOT AT ALL

## 2025-08-04 ASSESSMENT — LIFESTYLE VARIABLES
HOW MANY STANDARD DRINKS CONTAINING ALCOHOL DO YOU HAVE ON A TYPICAL DAY: PATIENT DOES NOT DRINK
HOW OFTEN DO YOU HAVE A DRINK CONTAINING ALCOHOL: MONTHLY OR LESS

## 2025-08-20 ENCOUNTER — OFFICE VISIT (OUTPATIENT)
Dept: ORTHOPEDIC SURGERY | Age: 80
End: 2025-08-20

## 2025-08-20 DIAGNOSIS — G89.29 CHRONIC PAIN OF RIGHT ANKLE: ICD-10-CM

## 2025-08-20 DIAGNOSIS — M17.12 PRIMARY OSTEOARTHRITIS OF LEFT KNEE: ICD-10-CM

## 2025-08-20 DIAGNOSIS — M22.41 CHONDROMALACIA PATELLAE OF RIGHT KNEE: ICD-10-CM

## 2025-08-20 DIAGNOSIS — M22.42 CHONDROMALACIA PATELLAE OF LEFT KNEE: ICD-10-CM

## 2025-08-20 DIAGNOSIS — M17.11 PRIMARY OSTEOARTHRITIS OF RIGHT KNEE: ICD-10-CM

## 2025-08-20 DIAGNOSIS — M19.071 LOCALIZED OSTEOARTHRITIS OF RIGHT ANKLE: Primary | ICD-10-CM

## 2025-08-20 DIAGNOSIS — M25.571 CHRONIC PAIN OF RIGHT ANKLE: ICD-10-CM

## 2025-08-20 RX ORDER — BUPIVACAINE HYDROCHLORIDE 2.5 MG/ML
1 INJECTION, SOLUTION INFILTRATION; PERINEURAL ONCE
Status: COMPLETED | OUTPATIENT
Start: 2025-08-20 | End: 2025-08-20

## 2025-08-20 RX ORDER — BETAMETHASONE SODIUM PHOSPHATE AND BETAMETHASONE ACETATE 3; 3 MG/ML; MG/ML
6 INJECTION, SUSPENSION INTRA-ARTICULAR; INTRALESIONAL; INTRAMUSCULAR; SOFT TISSUE ONCE
Status: COMPLETED | OUTPATIENT
Start: 2025-08-20 | End: 2025-08-20

## 2025-08-20 RX ADMIN — BUPIVACAINE HYDROCHLORIDE 2.5 MG: 2.5 INJECTION, SOLUTION INFILTRATION; PERINEURAL at 14:35

## 2025-08-20 RX ADMIN — Medication 1 ML: at 14:37

## 2025-08-20 RX ADMIN — BETAMETHASONE SODIUM PHOSPHATE AND BETAMETHASONE ACETATE 6 MG: 3; 3 INJECTION, SUSPENSION INTRA-ARTICULAR; INTRALESIONAL; INTRAMUSCULAR; SOFT TISSUE at 14:26

## 2025-08-21 DIAGNOSIS — M25.562 CHRONIC PAIN OF BOTH KNEES: ICD-10-CM

## 2025-08-21 DIAGNOSIS — G89.29 CHRONIC PAIN OF BOTH KNEES: ICD-10-CM

## 2025-08-21 DIAGNOSIS — M17.0 PRIMARY OSTEOARTHRITIS OF BOTH KNEES: Primary | ICD-10-CM

## 2025-08-21 DIAGNOSIS — M25.561 CHRONIC PAIN OF BOTH KNEES: ICD-10-CM

## 2025-08-21 DIAGNOSIS — M22.41 CHONDROMALACIA OF BOTH PATELLAE: ICD-10-CM

## 2025-08-21 DIAGNOSIS — M22.42 CHONDROMALACIA OF BOTH PATELLAE: ICD-10-CM

## (undated) DEVICE — CONMED SCOPE SAVER BITE BLOCK, 20X27 MM: Brand: SCOPE SAVER

## (undated) DEVICE — ELECTRODE ECG MONITR FOAM TEAR DROP ADLT RED

## (undated) DEVICE — KIT INF CTRL 2OZ LUB TBNG L12FT DBL END BRSH SYR OP4

## (undated) DEVICE — Z DISCONTINUED USE 2276105 GOWN PROTCT UNIV CHST W28IN L49IN SL 24IN BLU SPUNBOND FLM

## (undated) DEVICE — AIRLIFE™ PEDIATRIC CUSHION NASAL CANNULA 7 FEET (2.1 M) CRUSH-RESISTANT OXYGEN TUBING: Brand: AIRLIFE™